# Patient Record
Sex: FEMALE | Race: WHITE | NOT HISPANIC OR LATINO | Employment: FULL TIME | URBAN - METROPOLITAN AREA
[De-identification: names, ages, dates, MRNs, and addresses within clinical notes are randomized per-mention and may not be internally consistent; named-entity substitution may affect disease eponyms.]

---

## 2020-06-02 ENCOUNTER — TELEPHONE (OUTPATIENT)
Dept: URGENT CARE | Facility: CLINIC | Age: 20
End: 2020-06-02

## 2020-06-02 NOTE — TELEPHONE ENCOUNTER
----- Message from Faheem Varma MD sent at 6/2/2020  5:23 PM EDT -----  Negative test     Called and spoke to patient informed her of her negative covid 19 test results. She said she still has a sore throat and I informed her she still need to quarantine for the next 8 days, and not to return to work until she was symptom free for 72 hours. She stated she understood

## 2020-09-29 ENCOUNTER — HOSPITAL ENCOUNTER (OUTPATIENT)
Dept: GENERAL RADIOLOGY | Facility: HOSPITAL | Age: 20
Discharge: HOME | End: 2020-09-29
Admitting: PHYSICIAN ASSISTANT

## 2020-09-29 ENCOUNTER — OFFICE VISIT (OUTPATIENT)
Dept: INTERNAL MEDICINE | Facility: CLINIC | Age: 20
End: 2020-09-29

## 2020-09-29 VITALS — TEMPERATURE: 97.7 F | HEIGHT: 66 IN | BODY MASS INDEX: 23.11 KG/M2 | WEIGHT: 143.8 LBS

## 2020-09-29 DIAGNOSIS — R07.81 PLEURITIC CHEST PAIN: Primary | ICD-10-CM

## 2020-09-29 PROBLEM — G43.909 MIGRAINE HEADACHE: Status: ACTIVE | Noted: 2020-09-29

## 2020-09-29 PROCEDURE — 71046 X-RAY EXAM CHEST 2 VIEWS: CPT

## 2020-09-29 PROCEDURE — 99203 OFFICE O/P NEW LOW 30 MIN: CPT | Performed by: PHYSICIAN ASSISTANT

## 2020-09-29 RX ORDER — NAPROXEN 500 MG/1
500 TABLET ORAL 2 TIMES DAILY WITH MEALS
Qty: 14 TABLET | Refills: 0 | Status: SHIPPED | OUTPATIENT
Start: 2020-09-29 | End: 2020-10-06

## 2020-09-29 RX ORDER — IBUPROFEN 200 MG
200 TABLET ORAL EVERY 6 HOURS PRN
COMMUNITY
End: 2021-07-12

## 2020-09-29 RX ORDER — TOPIRAMATE 25 MG/1
25 CAPSULE, COATED PELLETS ORAL AS NEEDED
COMMUNITY
End: 2022-01-23

## 2020-09-29 NOTE — PROGRESS NOTES
Subjective   Chief Complaint   Patient presents with   • Back Pain     NP- radiating to chest       History of Present Illness      Pt is a 20 yr old female with migraine headaches who presents today to establish care and for complaint of right scapular pain. She has occular migraines, but has not had one in almost a year. Stress tends to trigger and increase the frequency of them. She does have a dull frontal headache every other day that is relieved with nsaids. She has no changes in her vision, nausea or vomiting.     She takes topamax sprinkles prn for acute migraine headaches which has helped.     She had a pap smear in March and is [facility] to [facility] care since moving. She has no family hx of breast cancer or colon cancer. She is seeing Sammi Esquivel, GYN.    Has a history of back pain radiating anteirorly to chest with vaping with jewel. She quit vaping a few months.  This weekend had similar pain while sitting watching football. Had a constant stabbing pain starting in her right shoulder blade and radiating anteriorly to her chest. She took 2 tylenol tablets and an advil. Next day woke up and it was gone, but not as bad.     Not affected by eating. Had sharp pain with taking a deep breath. No abdominal pain. No rash present. Pressing on it made it better. No coughing or chills.      Patient Active Problem List   Diagnosis   • Migraine headache       No Known Allergies    Current Outpatient Medications on File Prior to Visit   Medication Sig Dispense Refill   • Levonorgestrel (KYLEENA IU) by Intrauterine route.     • topiramate (TOPAMAX) 25 MG capsule (sprinkle) Take 25 mg by mouth As Needed.     • ibuprofen (ADVIL,MOTRIN) 200 MG tablet Take 200 mg by mouth Every 6 (Six) Hours As Needed.       No current facility-administered medications on file prior to visit.        Past Medical History:   Diagnosis Date   • Migraine        Family History   Problem Relation Age of Onset   • Diabetes  Mother    • Cancer Father         melanoma   • Hyperlipidemia Father    • Hypertension Father    • Epilepsy Father    • Migraines Paternal Aunt    • Diabetes Maternal Grandmother    • Cancer Paternal Grandfather         melanoma       Social History     Socioeconomic History   • Marital status: Single     Spouse name: Not on file   • Number of children: Not on file   • Years of education: Not on file   • Highest education level: Not on file   Tobacco Use   • Smoking status: Former Smoker   • Smokeless tobacco: Never Used   • Tobacco comment: vaping   Substance and Sexual Activity   • Alcohol use: Yes   • Drug use: Never   • Sexual activity: Yes       Past Surgical History:   Procedure Laterality Date   • EAR TUBES     • WISDOM TOOTH EXTRACTION           The following portions of the patient's history were reviewed and updated as appropriate: problem list, allergies, current medications, past medical history, past family history, past social history and past surgical history.    Review of Systems   Skin: Negative for rash.   Musculoskeletal: Positive for back pain.   Gastrointestinal: Negative for bloating, abdominal pain, change in bowel habit, constipation, diarrhea and heartburn.       Immunization History   Administered Date(s) Administered   • DTaP 1998, 1998, 01/21/1999, 01/13/2000, 07/02/2002   • Flu Vaccine Quad PF >18YRS 10/24/2001   • HPV Quadrivalent 07/13/2009, 09/14/2009, 01/15/2010   • Hep A / Hep B 01/02/2018, 02/07/2018, 07/12/2018   • Hep A, 2 Dose 10/24/2008, 07/13/2009, 07/31/2009   • Hep B, Adolescent or Pediatric 1998, 1998, 01/21/1999   • Hib (HbOC) 1998, 1998, 01/21/1999, 01/13/2000   • Hib (PRP-OMP) 1998, 1998, 01/21/1999, 01/13/2000   • Hpv9 11/09/2017   • IPV 1998, 1998, 01/13/2000, 07/12/2002   • Influenza LAIV (Nasal) 10/24/2008   • Influenza Quad Vaccine (Inpatient) 10/24/2001   • MMR 10/11/1999, 07/12/2002   • Meningococcal  "MCV4P (Menactra) 09/14/2009, 11/09/2017   • OPV 1998, 1998, 01/13/2000   • PPD Test 02/28/2017, 11/09/2017   • Pneumococcal Conjugate 13-Valent (PCV13) 11/22/2000   • Tdap 07/13/2009, 12/01/2017   • Varicella 07/15/1999, 01/13/2000, 10/24/2008, 02/07/2018       Objective   Vitals:    09/29/20 0830   Temp: 97.7 °F (36.5 °C)   Weight: 65.2 kg (143 lb 12.8 oz)   Height: 167.6 cm (66\")     Body mass index is 23.21 kg/m².  Physical Exam  Vitals signs reviewed.   Constitutional:       Appearance: Normal appearance.   HENT:      Head: Normocephalic and atraumatic.   Eyes:      Extraocular Movements: Extraocular movements intact.      Conjunctiva/sclera: Conjunctivae normal.      Pupils: Pupils are equal, round, and reactive to light.   Neck:      Musculoskeletal: Neck supple.   Cardiovascular:      Rate and Rhythm: Normal rate and regular rhythm.      Heart sounds: Normal heart sounds.   Pulmonary:      Effort: Pulmonary effort is normal.      Breath sounds: Normal breath sounds.   Abdominal:      General: Abdomen is flat. Bowel sounds are normal. There is no distension.      Palpations: Abdomen is soft. There is no mass.      Tenderness: There is no abdominal tenderness.   Musculoskeletal:      Comments: No tenderness over thoracic spine. No tenderness over right scapula.    Skin:     Comments: No rashes present   Neurological:      Mental Status: She is alert.       Assessment/Plan   Tran was seen today for back pain.    Diagnoses and all orders for this visit:    Pleuritic chest pain  -     XR Chest 2 View  -     naproxen (Naprosyn) 500 MG tablet; Take 1 tablet by mouth 2 (Two) Times a Day With Meals for 7 days.      Sounds like pleuritic pain, treat with nsaids x 1 week and CXR. Call if not improving.          "

## 2020-09-30 ENCOUNTER — TELEPHONE (OUTPATIENT)
Dept: INTERNAL MEDICINE | Facility: CLINIC | Age: 20
End: 2020-09-30

## 2020-09-30 NOTE — TELEPHONE ENCOUNTER
Patient is calling to state she is returning a call from [facility].    Unable to reach .    Please advise.    Patient call back [phone]

## 2020-10-28 ENCOUNTER — TELEMEDICINE (OUTPATIENT)
Dept: FAMILY MEDICINE CLINIC | Facility: TELEHEALTH | Age: 20
End: 2020-10-28

## 2020-10-28 DIAGNOSIS — Z76.89 RETURN TO WORK EVALUATION: Primary | ICD-10-CM

## 2020-10-28 DIAGNOSIS — J02.0 STREP PHARYNGITIS: ICD-10-CM

## 2020-10-28 PROBLEM — Z97.5 IUD (INTRAUTERINE DEVICE) IN PLACE: Status: ACTIVE | Noted: 2020-03-03

## 2020-10-28 PROBLEM — J01.00 ACUTE MAXILLARY SINUSITIS: Status: ACTIVE | Noted: 2020-01-28

## 2020-10-28 PROCEDURE — 99212 OFFICE O/P EST SF 10 MIN: CPT | Performed by: NURSE PRACTITIONER

## 2020-10-28 RX ORDER — FLUCONAZOLE 150 MG/1
TABLET ORAL
COMMUNITY
Start: 2020-10-20 | End: 2020-11-30

## 2020-10-28 NOTE — PROGRESS NOTES
CHIEF COMPLAINT  Chief Complaint   Patient presents with   • return to work   • Sore Throat         HPI  Tran Jocelyn Thompson is a 20 y.o. female  presents with complaint of strep diagnosed on 10/25/20 at .  Was tested for COVID-19 and result was negative. Works at [employer].  She feels much better today after being on Amoxicillin for 48 hours.    Review of Systems   Constitutional: Negative for activity change, appetite change and fever.   HENT: Negative for sore throat.    All other systems reviewed and are negative.      Past Medical History:   Diagnosis Date   • Migraine        Family History   Problem Relation Age of Onset   • Diabetes Mother    • Cancer Father         melanoma   • Hyperlipidemia Father    • Hypertension Father    • Epilepsy Father    • Migraines Paternal Aunt    • Diabetes Maternal Grandmother    • Cancer Paternal Grandfather         melanoma       Social History     Socioeconomic History   • Marital status: Single     Spouse name: Not on file   • Number of children: Not on file   • Years of education: Not on file   • Highest education level: Not on file   Tobacco Use   • Smoking status: Former Smoker   • Smokeless tobacco: Never Used   • Tobacco comment: vaping   Substance and Sexual Activity   • Alcohol use: Yes   • Drug use: Never   • Sexual activity: Yes         There were no vitals taken for this visit.    PHYSICAL EXAM  Physical Exam   Constitutional: She is oriented to person, place, and time. She appears well-developed and well-nourished. She does not have a sickly appearance. She does not appear ill.   HENT:   Head: Normocephalic and atraumatic.   Pulmonary/Chest: Effort normal.  No respiratory distress.  Neurological: She is alert and oriented to person, place, and time.       Results for orders placed or performed during the hospital encounter of 10/26/20   COVID-19,[facility] ROUTINE, NP/OP SWAB IN TRANSPORT MEDIA OR ESWAB 72 HR TAT - Swab, Anterior nasal     Specimen: Anterior nasal; Swab   Result Value Ref Range    SARS-CoV-2, CYNTHIA Not Detected Not Detected   POCT Rapid Strep A    Specimen: Swab   Result Value Ref Range    Rapid Strep A Screen Positive (A) Negative, VALID, INVALID, Not Performed    Internal Control Passed Passed    Lot Number [id number]     Expiration Date 4,773,021        Diagnoses and all orders for this visit:    1. Return to work evaluation (Primary)  --RTW for employee health sent in letter to patient MyChart    2. Strep pharyngitis  --has improved, cleared for RTW per guidelines        FOLLOW-UP  As discussed during visit with PCP/[facility] if no improvement or Urgent Care/Emergency Department if worsening of symptoms    Patient verbalizes understanding of medication dosage, comfort measures, instructions for treatment and follow-up.    BETZY Zavaleta  10/28/2020  11:21 EDT    This visit was performed via Telehealth.  This patient has been instructed to follow-up with their primary care provider if their symptoms worsen or the treatment provided does not resolve their illness.

## 2020-10-28 NOTE — PATIENT INSTRUCTIONS
Strep Throat, Adult  Strep throat is an infection in the throat that is caused by bacteria. It is common during the cold months of the year. It mostly affects children who are 5-15 years old. However, people of all ages can get it at any time of the year. This infection spreads from person to person (is contagious) through coughing, sneezing, or having close contact.  Your health care provider may use other names to describe the infection. It can be called tonsillitis (if there is swelling of the tonsils), or pharyngitis (if there is swelling at the back of the throat).  What are the causes?  This condition is caused by the Streptococcus pyogenes bacteria.  What increases the risk?  You are more likely to develop this condition if:  · You care for school-age children, or are around school-age children. Children are more likely to get strep throat and may spread it to others.  · You spend time in crowded places where the infection can spread easily.  · You have close contact with someone who has strep throat.  What are the signs or symptoms?  Symptoms of this condition include:  · Fever or chills.  · Redness, swelling, or pain in the tonsils or throat.  · Pain or difficulty when swallowing.  · White or yellow spots on the tonsils or throat.  · Tender glands in the neck and under the jaw.  · Bad smelling breath.  · Red rash all over the body. This is rare.  How is this diagnosed?  This condition is diagnosed by tests that check for the presence and the amount of bacteria that cause strep throat. They are:  · Rapid strep test. Your throat is swabbed and checked for the presence of bacteria. Results are usually ready in minutes.  · Throat culture test. Your throat is swabbed. The sample is placed in a cup that allows infections to grow. Results are usually ready in 1 or 2 days.  How is this treated?  This condition may be treated with:  · Medicines that kill germs (antibiotics).  · Medicines that relieve pain or fever.  These include:  ? Ibuprofen or acetaminophen.  ? Aspirin, only for patients who are over the age of 18.  ? Throat lozenges.  ? Throat sprays.  Follow these instructions at home:  Medicines    · Take over-the-counter and prescription medicines only as told by your health care provider.  · Take your antibiotic medicine as told by your health care provider. Do not stop taking the antibiotic even if you start to feel better.  Eating and drinking    · If you have trouble swallowing, try eating soft foods until your sore throat feels better.  · Drink enough fluid to keep your urine pale yellow.  · To help relieve pain, you may have:  ? Warm fluids, such as soup and tea.  ? Cold fluids, such as frozen desserts or popsicles.  General instructions  · Gargle with a salt-water mixture 3-4 times a day or as needed. To make a salt-water mixture, completely dissolve ½-1 tsp (3-6 g) of salt in 1 cup (237 mL) of warm water.  · Get plenty of rest.  · Stay home from work or school until you have been taking antibiotics for 24 hours.  · Avoid smoking or being around people who smoke.  · Keep all follow-up visits as told by your health care provider. This is important.  How is this prevented?    · Do not share food, drinking cups, or personal items that could cause the infection to spread to other people.  · Wash your hands well with soap and water, and make sure that all people in your house wash their hands well.  · Have family members tested if they have a sore throat or fever. They may need an antibiotic if they have strep throat.  Contact a health care provider if:  · The glands in your neck continue to get bigger.  · You develop a rash, cough, or earache.  · You cough up a thick mucus that is green, yellow-brown, or bloody.  · You have pain or discomfort that does not get better with medicine.  · Your symptoms seem to be getting worse and not better.  · You have a fever.  Get help right away if:  · You have new symptoms, such as  vomiting, severe headache, stiff or painful neck, chest pain, or shortness of breath.  · You have severe throat pain, drooling, or changes in your voice.  · You have swelling of the neck, or the skin on the neck becomes red and tender.  · You have signs of dehydration, such as tiredness (fatigue), dry mouth, and decreased urination.  · You become increasingly sleepy, or you cannot wake up completely.  · Your joints become red or painful.  Summary  · Strep throat is an infection in the throat that is caused by the Streptococcus pyogenes bacteria. This infection is spread from person to person (is contagious) through coughing, sneezing, or having close contact.  · Take your medicines, including antibiotics, as told by your health care provider. Do not stop taking the antibiotic even if you start to feel better.  · To prevent the spread of germs, wash your hands well with soap and water. Have others do the same. Do not share food, drinking cups, or personal items.  · Get help right away if you have new symptoms, such as vomiting, severe headache, stiff or painful neck, chest pain, or shortness of breath.  This information is not intended to replace advice given to you by your health care provider. Make sure you discuss any questions you have with your health care provider.  Document Released: 12/15/2001 Document Revised: 03/06/2020 Document Reviewed: 03/06/2020  [employer] Patient Education © 2020 [employer]

## 2020-11-30 ENCOUNTER — TELEMEDICINE (OUTPATIENT)
Dept: FAMILY MEDICINE CLINIC | Facility: TELEHEALTH | Age: 20
End: 2020-11-30

## 2020-11-30 VITALS — WEIGHT: 145 LBS | BODY MASS INDEX: 23.3 KG/M2 | HEIGHT: 66 IN

## 2020-11-30 DIAGNOSIS — J02.9 PHARYNGITIS, UNSPECIFIED ETIOLOGY: Primary | ICD-10-CM

## 2020-11-30 PROCEDURE — 99213 OFFICE O/P EST LOW 20 MIN: CPT | Performed by: NURSE PRACTITIONER

## 2020-11-30 RX ORDER — FLUCONAZOLE 150 MG/1
150 TABLET ORAL ONCE
Qty: 1 TABLET | Refills: 0 | Status: SHIPPED | OUTPATIENT
Start: 2020-11-30 | End: 2020-11-30

## 2020-11-30 RX ORDER — AMOXICILLIN 875 MG/1
875 TABLET, COATED ORAL 2 TIMES DAILY
Qty: 20 TABLET | Refills: 0 | Status: SHIPPED | OUTPATIENT
Start: 2020-11-30 | End: 2021-02-03

## 2020-11-30 NOTE — PROGRESS NOTES
CHIEF COMPLAINT  Chief Complaint   Patient presents with   • Sore Throat     she thinks its strep throat         HPI  Tran Jocelyn Thompson is a 20 y.o. female  presents with complaint of sore throat that is severe and she thinks she has strep throat again. She has h/o strep throat and was just treated for it last month with Amoxicillin. She is planning to see an ENT soon. She denies headache, nausea or vomiting or fever. She states she rarely has a fever when she gets strep throat. She is using Advil for pain. She tested negative for covid-19 with the results obtained today.     Review of Systems   Constitutional: Negative.    HENT: Positive for rhinorrhea, sneezing, sore throat and trouble swallowing. Negative for ear pain, postnasal drip, sinus pressure and sinus pain.    Eyes: Negative.    Respiratory: Negative.    Cardiovascular: Negative.    Gastrointestinal: Negative.    Musculoskeletal: Positive for neck pain.        Right sided lymph node tenderness and swelling   Skin: Negative.    Allergic/Immunologic: Positive for environmental allergies.   Neurological: Negative.    Hematological: Negative.    Psychiatric/Behavioral: Negative.        Past Medical History:   Diagnosis Date   • History of strep sore throat    • Migraine        Family History   Problem Relation Age of Onset   • Diabetes Mother    • Cancer Father         melanoma   • Hyperlipidemia Father    • Hypertension Father    • Epilepsy Father    • Migraines Paternal Aunt    • Diabetes Maternal Grandmother    • Cancer Paternal Grandfather         melanoma       Social History     Socioeconomic History   • Marital status: Single     Spouse name: Not on file   • Number of children: Not on file   • Years of education: Not on file   • Highest education level: Not on file   Tobacco Use   • Smoking status: Former Smoker   • Smokeless tobacco: Never Used   • Tobacco comment: vaping   Substance and Sexual Activity   • Alcohol use: Yes   • Drug use: Never   •  "Sexual activity: Yes         Ht 167.6 cm (66\")   Wt 65.8 kg (145 lb)   BMI 23.40 kg/m²     PHYSICAL EXAM  Physical Exam   Constitutional: She is oriented to person, place, and time. She appears well-developed and well-nourished. She does not have a sickly appearance. She does not appear ill. No distress.   HENT:   Head: Normocephalic and atraumatic.   Right Ear: Hearing and external ear normal.   Left Ear: Hearing and external ear normal.   Nose: Rhinorrhea present. Right sinus exhibits no maxillary sinus tenderness and no frontal sinus tenderness. Left sinus exhibits no maxillary sinus tenderness and no frontal sinus tenderness.   Mouth/Throat: Mouth/Lips are normal.Oropharyngeal exudate present.   Oral pharynx erythremic with exudate and tonsillar swelling.     Eyes: Conjunctivae are normal.   Pulmonary/Chest: Effort normal.  No respiratory distress. She no audible wheeze...  Lymphadenopathy:     She has cervical adenopathy (right sided lymph node swelling ).   Neurological: She is alert and oriented to person, place, and time.   Psychiatric: She has a normal mood and affect.   Vitals reviewed.      Results for orders placed or performed during the hospital encounter of 10/26/20   COVID-19,[facility] ROUTINE, NP/OP SWAB IN TRANSPORT MEDIA OR ESWAB 72 HR TAT - Swab, Anterior nasal    Specimen: Anterior nasal; Swab   Result Value Ref Range    SARS-CoV-2, CYNTHIA Not Detected Not Detected   POCT Rapid Strep A    Specimen: Swab   Result Value Ref Range    Rapid Strep A Screen Positive (A) Negative, VALID, INVALID, Not Performed    Internal Control Passed Passed    Lot Number [id number]     Expiration Date 4,302,021        Diagnoses and all orders for this visit:    1. Pharyngitis, unspecified etiology (Primary)  -     amoxicillin (AMOXIL) 875 MG tablet; Take 1 tablet by mouth 2 (Two) Times a Day.  Dispense: 20 tablet; Refill: 0  -     fluconazole (Diflucan) 150 MG tablet; Take 1 tablet by mouth 1 (One) Time for 1 dose.  " Dispense: 1 tablet; Refill: 0    treated empirically for strep throat due to history and symptoms.   Dispose of tooth brush after 48 hours of antibiotic therapy.   Warm salt water gargles.   Follow up with PCP for evaluation of frequent strep throat.     **if at any time experiences fever AND/OR worsening cough AND/OR shortness of breath AND/OR loss of sense of taste or smell, has been advised to go to nearest urgent care or emergency department for evaluation AND/OR testing    **if no improvement, but not worsening, may schedule a f/u virtual visit or E-visit      FOLLOW-UP  As discussed during visit with PCP/Virtual Care if no improvement or Urgent Care/Emergency Department if worsening of symptoms    Patient verbalizes understanding of medication dosage, comfort measures, instructions for treatment and follow-up.    Yohana Tomlin, BETZY  11/30/2020  18:01 EST    This visit was performed via Telehealth.  This patient has been instructed to follow-up with their primary care provider if their symptoms worsen or the treatment provided does not resolve their illness.

## 2020-11-30 NOTE — PATIENT INSTRUCTIONS
Strep Throat, Adult  Strep throat is an infection of the throat. It is caused by germs (bacteria). Strep throat is common during the cold months of the year. It mostly affects children who are 5-15 years old. However, people of all ages can get it at any time of the year.  When strep throat affects the tonsils, it is called tonsillitis. When it affects the back of the throat, it is called pharyngitis. This infection spreads from person to person through coughing, sneezing, or having close contact.  What are the causes?  This condition is caused by the Streptococcus pyogenes germ.  What increases the risk?  You are more likely to develop this condition if:  · You care for young children. Children are more likely to get strep throat and may spread it to others.  · You go to crowded places. Germs can spread easily in such places.  · You kiss or touch someone who has strep throat.  What are the signs or symptoms?  Symptoms of this condition include:  · Fever or chills.  · Redness, swelling, or pain in the tonsils or throat.  · Pain or trouble when swallowing.  · White or yellow spots on the tonsils or throat.  · Tender glands in the neck and under the jaw.  · Bad breath.  · Red rash all over the body. This is rare.  How is this treated?  This condition may be treated with:  · Medicines that kill germs (antibiotics).  · Medicines that treat pain or fever. These include:  ? Ibuprofen or acetaminophen.  ? Aspirin, only for patients who are over the age of 18.  ? Throat lozenges.  ? Throat sprays.  Follow these instructions at home:  Medicines    · Take over-the-counter and prescription medicines only as told by your doctor.  · Take your antibiotic medicine as told by your doctor. Do not stop taking the antibiotic even if you start to feel better.  Eating and drinking    · If you have trouble swallowing, eat soft foods until your throat feels better.  · Drink enough fluid to keep your pee (urine) pale yellow.  · To help  with pain, you may have:  ? Warm fluids, such as soup and tea.  ? Cold fluids, such as frozen desserts or popsicles.  General instructions  · Rinse your mouth (gargle) with a salt-water mixture 3-4 times a day or as needed. To make a salt-water mixture, dissolve ½-1 tsp (3-6 g) of salt in 1 cup (237 mL) of warm water.  · Rest as much as you can.  · Stay home from work or school until you have been taking antibiotics for 24 hours.  · Avoid smoking or being around people who smoke.  · Keep all follow-up visits as told by your doctor. This is important.  How is this prevented?    · Do not share food, drinking cups, or personal items. They can cause the germs to spread.  · Wash your hands well with soap and water. Make sure that all people in your house wash their hands well.  · Have family members tested if they have a fever or a sore throat. They may need an antibiotic if they have strep throat.  Contact a doctor if:  · You have swelling in your neck that keeps getting bigger.  · You get a rash, cough, or earache.  · You cough up a thick fluid that is green, yellow-brown, or bloody.  · You have pain that does not get better with medicine.  · Your symptoms get worse instead of getting better.  · You have a fever.  Get help right away if:  · You vomit.  · You have a very bad headache.  · Your neck hurts or feels stiff.  · You have chest pain or are short of breath.  · You have drooling, very bad throat pain, or changes in your voice.  · Your neck is swollen, or the skin gets red and tender.  · Your mouth is dry, or you are peeing less than normal.  · You keep feeling more tired or have trouble waking up.  · Your joints are red or painful.  Summary  · Strep throat is an infection of the throat. It is caused by germs (bacteria).  · This infection can spread from person to person through coughing, sneezing, or having close contact.  · Take your medicines, including antibiotics, as told by your doctor. Do not stop taking  the antibiotic even if you start to feel better.  · To prevent the spread of germs, wash your hands well with soap and water. Have others do the same. Do not share food, drinking cups, or personal items.  · Get help right away if you have a bad headache, chest pain, shortness of breath, a stiff or painful neck, or you vomit.  This information is not intended to replace advice given to you by your health care provider. Make sure you discuss any questions you have with your health care provider.  Document Revised: 03/06/2020 Document Reviewed: 03/06/2020  Elsevier Patient Education © 2020 [employer]

## 2020-12-22 ENCOUNTER — IMMUNIZATION (OUTPATIENT)
Dept: VACCINE CLINIC | Facility: HOSPITAL | Age: 20
End: 2020-12-22

## 2020-12-22 PROCEDURE — 91300 HC SARSCOV02 VAC 30MCG/0.3ML IM: CPT | Performed by: INTERNAL MEDICINE

## 2020-12-22 PROCEDURE — 0001A: CPT | Performed by: INTERNAL MEDICINE

## 2021-01-12 ENCOUNTER — IMMUNIZATION (OUTPATIENT)
Dept: VACCINE CLINIC | Facility: HOSPITAL | Age: 21
End: 2021-01-12

## 2021-01-12 PROCEDURE — 91300 HC SARSCOV02 VAC 30MCG/0.3ML IM: CPT | Performed by: INTERNAL MEDICINE

## 2021-01-12 PROCEDURE — 0001A: CPT | Performed by: INTERNAL MEDICINE

## 2021-01-12 PROCEDURE — 0002A: CPT | Performed by: INTERNAL MEDICINE

## 2021-02-03 ENCOUNTER — OFFICE VISIT (OUTPATIENT)
Dept: INTERNAL MEDICINE | Facility: CLINIC | Age: 21
End: 2021-02-03

## 2021-02-03 VITALS — WEIGHT: 145 LBS | TEMPERATURE: 97.4 F | BODY MASS INDEX: 23.3 KG/M2 | HEIGHT: 66 IN

## 2021-02-03 DIAGNOSIS — K62.5 RECTAL BLEEDING: Primary | ICD-10-CM

## 2021-02-03 PROBLEM — J01.00 ACUTE MAXILLARY SINUSITIS: Status: RESOLVED | Noted: 2020-01-28 | Resolved: 2021-02-03

## 2021-02-03 LAB
BASOPHILS # BLD AUTO: 0.01 10*3/MM3 (ref 0–0.2)
BASOPHILS NFR BLD AUTO: 0.2 % (ref 0–1.5)
EOSINOPHIL # BLD AUTO: 0.1 10*3/MM3 (ref 0–0.4)
EOSINOPHIL NFR BLD AUTO: 1.8 % (ref 0.3–6.2)
ERYTHROCYTE [DISTWIDTH] IN BLOOD BY AUTOMATED COUNT: 12.3 % (ref 12.3–15.4)
HCT VFR BLD AUTO: 42.3 % (ref 34–46.6)
HGB BLD-MCNC: 14.1 G/DL (ref 12–15.9)
IMM GRANULOCYTES # BLD AUTO: 0.01 10*3/MM3 (ref 0–0.05)
IMM GRANULOCYTES NFR BLD AUTO: 0.2 % (ref 0–0.5)
LYMPHOCYTES # BLD AUTO: 2.13 10*3/MM3 (ref 0.7–3.1)
LYMPHOCYTES NFR BLD AUTO: 37.6 % (ref 19.6–45.3)
MCH RBC QN AUTO: 31 PG (ref 26.6–33)
MCHC RBC AUTO-ENTMCNC: 33.3 G/DL (ref 31.5–35.7)
MCV RBC AUTO: 93 FL (ref 79–97)
MONOCYTES # BLD AUTO: 0.39 10*3/MM3 (ref 0.1–0.9)
MONOCYTES NFR BLD AUTO: 6.9 % (ref 5–12)
NEUTROPHILS # BLD AUTO: 3.03 10*3/MM3 (ref 1.7–7)
NEUTROPHILS NFR BLD AUTO: 53.3 % (ref 42.7–76)
NRBC BLD AUTO-RTO: 0 /100 WBC (ref 0–0.2)
PLATELET # BLD AUTO: 217 10*3/MM3 (ref 140–450)
RBC # BLD AUTO: 4.55 10*6/MM3 (ref 3.77–5.28)
WBC # BLD AUTO: 5.67 10*3/MM3 (ref 3.4–10.8)

## 2021-02-03 PROCEDURE — 99213 OFFICE O/P EST LOW 20 MIN: CPT | Performed by: PHYSICIAN ASSISTANT

## 2021-02-03 NOTE — PROGRESS NOTES
Subjective   Chief Complaint   Patient presents with   • Rectal Bleeding     x2 days       History of Present Illness     Pt is here today with rectal bleeding x 2 days. She had a brazillian wax 2 days ago, noticed the blood with wiping after urinating. She does not have periods with her IUD. She has not had any constipation, straining or rectal pain.      Patient Active Problem List   Diagnosis   • Migraine headache   • IUD (intrauterine device) in place       No Known Allergies    Current Outpatient Medications on File Prior to Visit   Medication Sig Dispense Refill   • ibuprofen (ADVIL,MOTRIN) 200 MG tablet Take 200 mg by mouth Every 6 (Six) Hours As Needed.     • Levonorgestrel (KYLEENA IU) by Intrauterine route.     • topiramate (TOPAMAX) 25 MG capsule (sprinkle) Take 25 mg by mouth As Needed.     • [DISCONTINUED] amoxicillin (AMOXIL) 875 MG tablet Take 1 tablet by mouth 2 (Two) Times a Day. 20 tablet 0     No current facility-administered medications on file prior to visit.        History reviewed. No pertinent past medical history.    Family History   Problem Relation Age of Onset   • Diabetes Mother    • Cancer Father         melanoma   • Hyperlipidemia Father    • Hypertension Father    • Epilepsy Father    • Migraines Paternal Aunt    • Diabetes Maternal Grandmother    • Cancer Paternal Grandfather         melanoma       Social History     Socioeconomic History   • Marital status: Single     Spouse name: Not on file   • Number of children: Not on file   • Years of education: Not on file   • Highest education level: Not on file   Tobacco Use   • Smoking status: Former Smoker   • Smokeless tobacco: Never Used   • Tobacco comment: vaping   Substance and Sexual Activity   • Alcohol use: Yes   • Drug use: Never   • Sexual activity: Yes       Past Surgical History:   Procedure Laterality Date   • EAR TUBES     • WISDOM TOOTH EXTRACTION           The following portions of the patient's history were reviewed and  "updated as appropriate: problem list, allergies, current medications, past medical history, past family history, past social history and past surgical history.    Review of Systems   Gastrointestinal: Positive for hematochezia and hemorrhoids.       Immunization History   Administered Date(s) Administered   • COVID-19 (PFIZER) 12/22/2020, 01/12/2021   • DTaP 1998, 1998, 01/21/1999, 01/13/2000, 07/02/2002   • Flu Vaccine Quad PF >18YRS 10/24/2001   • HPV Quadrivalent 07/13/2009, 09/14/2009, 01/15/2010   • Hep A / Hep B 01/02/2018, 02/07/2018, 07/12/2018   • Hep A, 2 Dose 10/24/2008, 07/13/2009, 07/31/2009   • Hep B, Adolescent or Pediatric 1998, 1998, 01/21/1999   • Hib (HbOC) 1998, 1998, 01/21/1999, 01/13/2000   • Hib (PRP-OMP) 1998, 1998, 01/21/1999, 01/13/2000   • Hpv9 11/09/2017   • IPV 1998, 1998, 01/13/2000, 07/12/2002   • Influenza LAIV (Nasal) 10/24/2008   • Influenza Quad Vaccine (Inpatient) 10/24/2001   • MMR 10/11/1999, 07/12/2002   • Meningococcal MCV4P (Menactra) 09/14/2009, 11/09/2017   • OPV 1998, 1998, 01/13/2000   • PPD Test 02/28/2017, 11/09/2017   • Pneumococcal Conjugate 13-Valent (PCV13) 11/22/2000   • Tdap 07/13/2009, 12/01/2017   • Varicella 07/15/1999, 01/13/2000, 10/24/2008, 02/07/2018       Objective   Vitals:    02/03/21 1039   Temp: 97.4 °F (36.3 °C)   Weight: 65.8 kg (145 lb)   Height: 167.6 cm (66\")     Body mass index is 23.4 kg/m².  Physical Exam  Constitutional:       Appearance: Normal appearance.   Genitourinary:     Rectum: Guaiac result negative. No anal fissure, external hemorrhoid or internal hemorrhoid. Normal anal tone.   Neurological:      Mental Status: She is alert.           Assessment/Plan   Diagnoses and all orders for this visit:    1. Rectal bleeding (Primary)  -     CBC & Differential    Heme negative today, check CBC. If continue will have her see GI.     Return for Lab Today.           "

## 2021-02-10 ENCOUNTER — OFFICE VISIT (OUTPATIENT)
Dept: INTERNAL MEDICINE | Facility: CLINIC | Age: 21
End: 2021-02-10

## 2021-02-10 VITALS — TEMPERATURE: 97.3 F | HEIGHT: 66 IN | BODY MASS INDEX: 23.75 KG/M2 | WEIGHT: 147.8 LBS

## 2021-02-10 DIAGNOSIS — R10.2 PELVIC PAIN: Primary | ICD-10-CM

## 2021-02-10 PROCEDURE — 99213 OFFICE O/P EST LOW 20 MIN: CPT | Performed by: PHYSICIAN ASSISTANT

## 2021-02-10 NOTE — PROGRESS NOTES
Subjective   Chief Complaint   Patient presents with   • Abdominal Pain     ovarian        History of Present Illness      Pt is here today with ongoing lower abdominal pain. She states she has seen GYN twice and been treated for BV as well as candida. She continues to have discomfort around her introitus with some blisters she has felt. Dr. Esquivel examined and was unable to find any tearing or blisters. She has an IUD but does not have periods. She states she has discomfort with intercourse. Her pain feels like it could be ovarian pain.     Her copay for GYN is too expensive right now to go back and see her.       Patient Active Problem List   Diagnosis   • Migraine headache   • IUD (intrauterine device) in place       No Known Allergies    Current Outpatient Medications on File Prior to Visit   Medication Sig Dispense Refill   • ibuprofen (ADVIL,MOTRIN) 200 MG tablet Take 200 mg by mouth Every 6 (Six) Hours As Needed.     • Levonorgestrel (KYLEENA IU) by Intrauterine route.     • topiramate (TOPAMAX) 25 MG capsule (sprinkle) Take 25 mg by mouth As Needed.       No current facility-administered medications on file prior to visit.        History reviewed. No pertinent past medical history.    Family History   Problem Relation Age of Onset   • Diabetes Mother    • Cancer Father         melanoma   • Hyperlipidemia Father    • Hypertension Father    • Epilepsy Father    • Migraines Paternal Aunt    • Diabetes Maternal Grandmother    • Cancer Paternal Grandfather         melanoma       Social History     Socioeconomic History   • Marital status: Single     Spouse name: Not on file   • Number of children: Not on file   • Years of education: Not on file   • Highest education level: Not on file   Tobacco Use   • Smoking status: Former Smoker   • Smokeless tobacco: Never Used   • Tobacco comment: vaping   Substance and Sexual Activity   • Alcohol use: Yes   • Drug use: Never   • Sexual activity: Yes       Past Surgical  "History:   Procedure Laterality Date   • EAR TUBES     • WISDOM TOOTH EXTRACTION           The following portions of the patient's history were reviewed and updated as appropriate: problem list, allergies, current medications, past medical history, past family history, past social history and past surgical history.    Review of Systems   Genitourinary: Positive for genital sores and pelvic pain. Negative for non-menstrual bleeding.       Immunization History   Administered Date(s) Administered   • COVID-19 (PFIZER) 12/22/2020, 01/12/2021   • DTaP 1998, 1998, 01/21/1999, 01/13/2000, 07/02/2002   • Flu Vaccine Quad PF >18YRS 10/24/2001   • HPV Quadrivalent 07/13/2009, 09/14/2009, 01/15/2010   • Hep A / Hep B 01/02/2018, 02/07/2018, 07/12/2018   • Hep A, 2 Dose 10/24/2008, 07/13/2009, 07/31/2009   • Hep B, Adolescent or Pediatric 1998, 1998, 01/21/1999   • Hib (HbOC) 1998, 1998, 01/21/1999, 01/13/2000   • Hib (PRP-OMP) 1998, 1998, 01/21/1999, 01/13/2000   • Hpv9 11/09/2017   • IPV 1998, 1998, 01/13/2000, 07/12/2002   • Influenza LAIV (Nasal) 10/24/2008   • Influenza Quad Vaccine (Inpatient) 10/24/2001   • MMR 10/11/1999, 07/12/2002   • Meningococcal MCV4P (Menactra) 09/14/2009, 11/09/2017   • OPV 1998, 1998, 01/13/2000   • PPD Test 02/28/2017, 11/09/2017   • Pneumococcal Conjugate 13-Valent (PCV13) 11/22/2000   • Tdap 07/13/2009, 12/01/2017   • Varicella 07/15/1999, 01/13/2000, 10/24/2008, 02/07/2018       Objective   Vitals:    02/10/21 0813   Temp: 97.3 °F (36.3 °C)   Weight: 67 kg (147 lb 12.8 oz)   Height: 167.6 cm (66\")     Body mass index is 23.86 kg/m².  Physical Exam  Vitals signs reviewed.   Constitutional:       Appearance: Normal appearance.   Genitourinary:     Labia:         Right: No rash, tenderness, lesion or injury.         Left: No rash, tenderness, lesion or injury.       Urethra: No prolapse, urethral pain, urethral swelling or " urethral lesion.      Vagina: Normal.      Cervix: Normal.      Adnexa: Right adnexa normal.        Left: Tenderness (mild tenderness. ) present.    Neurological:      Mental Status: She is alert.       Assessment/Plan   Diagnoses and all orders for this visit:    1. Pelvic pain (Primary)  -     US Non-ob Transvaginal  -     US Testicular or Ovarian Vascular Limited    Will order pelvic US to be done in the next few days. Will also get office notes from Dr. Esquivel for review.

## 2021-02-12 DIAGNOSIS — R10.2 PELVIC PAIN: Primary | ICD-10-CM

## 2021-02-15 ENCOUNTER — HOSPITAL ENCOUNTER (OUTPATIENT)
Dept: ULTRASOUND IMAGING | Facility: HOSPITAL | Age: 21
Discharge: HOME | End: 2021-02-15
Admitting: PHYSICIAN ASSISTANT

## 2021-02-15 PROCEDURE — 93976 VASCULAR STUDY: CPT

## 2021-02-15 PROCEDURE — 76856 US EXAM PELVIC COMPLETE: CPT

## 2021-02-15 PROCEDURE — 76830 TRANSVAGINAL US NON-OB: CPT

## 2021-02-16 ENCOUNTER — HOSPITAL ENCOUNTER (OUTPATIENT)
Dept: ULTRASOUND IMAGING | Facility: HOSPITAL | Age: 21
End: 2021-02-16

## 2021-04-07 ENCOUNTER — OFFICE VISIT (OUTPATIENT)
Dept: INTERNAL MEDICINE | Facility: CLINIC | Age: 21
End: 2021-04-07

## 2021-04-07 VITALS
DIASTOLIC BLOOD PRESSURE: 80 MMHG | WEIGHT: 144 LBS | TEMPERATURE: 97.7 F | SYSTOLIC BLOOD PRESSURE: 130 MMHG | BODY MASS INDEX: 23.14 KG/M2 | HEIGHT: 66 IN

## 2021-04-07 DIAGNOSIS — R19.5 LOOSE STOOLS: ICD-10-CM

## 2021-04-07 DIAGNOSIS — R10.30 LOWER ABDOMINAL PAIN: ICD-10-CM

## 2021-04-07 DIAGNOSIS — A46: Primary | ICD-10-CM

## 2021-04-07 PROCEDURE — 99214 OFFICE O/P EST MOD 30 MIN: CPT | Performed by: PHYSICIAN ASSISTANT

## 2021-04-07 NOTE — PROGRESS NOTES
Subjective   Chief Complaint   Patient presents with   • Abdominal Pain     LLQ, LRQ   • Rectal Bleeding     x 2       History of Present Illness     She has had 6 infections of strep throat in the last 12 months. She has been treated with Amoxicillin in the past and was most recently treated with Cefdinir and Prednisone. She started having pain in her abdomen after starting the antibiotic. After stopping the Cefdinir she then developed rectal bleeding. She only took 4 days of the Cefdinir from the pain. She was having lower abdominal pain over the weekend and it starts just below and around her umbilicus with sharp pains and dull constant pain. Ever since starting the abx she has had diarrhea and loose stools.     She also had mucus around her stool after first starting the abx.     She continues to have recurrent yeast infections and BV She just completed 14 tabs of Flagyl also.     She has continued to have loose stools. Still has some cramping in her central abdomen .     Patient Active Problem List   Diagnosis   • Migraine headache   • IUD (intrauterine device) in place       No Known Allergies    Current Outpatient Medications on File Prior to Visit   Medication Sig Dispense Refill   • ibuprofen (ADVIL,MOTRIN) 200 MG tablet Take 200 mg by mouth Every 6 (Six) Hours As Needed.     • Levonorgestrel (KYLEENA IU) by Intrauterine route.     • topiramate (TOPAMAX) 25 MG capsule (sprinkle) Take 25 mg by mouth As Needed.       No current facility-administered medications on file prior to visit.       History reviewed. No pertinent past medical history.    Family History   Problem Relation Age of Onset   • Diabetes Mother    • Cancer Father         melanoma   • Hyperlipidemia Father    • Hypertension Father    • Epilepsy Father    • Migraines Paternal Aunt    • Diabetes Maternal Grandmother    • Cancer Paternal Grandfather         melanoma       Social History     Socioeconomic History   • Marital status: Single      Spouse name: Not on file   • Number of children: Not on file   • Years of education: Not on file   • Highest education level: Not on file   Tobacco Use   • Smoking status: Former Smoker   • Smokeless tobacco: Never Used   • Tobacco comment: vaping   Vaping Use   • Vaping Use: Former   Substance and Sexual Activity   • Alcohol use: Yes   • Drug use: Never   • Sexual activity: Yes       Past Surgical History:   Procedure Laterality Date   • EAR TUBES     • WISDOM TOOTH EXTRACTION       The following portions of the patient's history were reviewed and updated as appropriate: problem list, allergies, current medications, past medical history, past family history, past social history and past surgical history.    Review of Systems   Constitutional: Negative for fever and weight loss.   Musculoskeletal: Negative for myalgias.   Gastrointestinal: Positive for abdominal pain, diarrhea and hematochezia. Negative for anorexia, constipation, flatus, melena, nausea and vomiting.   Genitourinary: Negative for dysuria, frequency and hematuria.   Neurological: Negative for headaches.       Immunization History   Administered Date(s) Administered   • COVID-19 (PFIZER) 12/22/2020, 01/12/2021   • DTaP 1998, 1998, 01/21/1999, 01/13/2000, 07/02/2002   • Flu Vaccine Quad PF >18YRS 10/24/2001   • HPV Quadrivalent 07/13/2009, 09/14/2009, 01/15/2010   • Hep A / Hep B 01/02/2018, 02/07/2018, 07/12/2018   • Hep A, 2 Dose 10/24/2008, 07/13/2009, 07/31/2009   • Hep B, Adolescent or Pediatric 1998, 1998, 01/21/1999   • Hib (HbOC) 1998, 1998, 01/21/1999, 01/13/2000   • Hib (PRP-OMP) 1998, 1998, 01/21/1999, 01/13/2000   • Hpv9 11/09/2017   • IPV 1998, 1998, 01/13/2000, 07/12/2002   • Influenza LAIV (Nasal) 10/24/2008   • Influenza Quad Vaccine (Inpatient) 10/24/2001   • MMR 10/11/1999, 07/12/2002   • Meningococcal MCV4P (Menactra) 09/14/2009, 11/09/2017   • OPV 1998, 1998,  "01/13/2000   • PPD Test 02/28/2017, 11/09/2017   • Pneumococcal Conjugate 13-Valent (PCV13) 11/22/2000   • Tdap 07/13/2009, 12/01/2017   • Varicella 07/15/1999, 01/13/2000, 10/24/2008, 02/07/2018       Objective   Vitals:    04/07/21 1120 04/07/21 1153   BP:  130/80   Temp: 97.7 °F (36.5 °C)    Weight: 65.3 kg (144 lb)    Height: 167.6 cm (66\")      Body mass index is 23.24 kg/m².  Physical Exam  Vitals reviewed.   Constitutional:       Appearance: Normal appearance.   Cardiovascular:      Rate and Rhythm: Normal rate and regular rhythm.      Heart sounds: Normal heart sounds.   Pulmonary:      Effort: Pulmonary effort is normal.      Breath sounds: Normal breath sounds.   Abdominal:      General: Abdomen is flat. Bowel sounds are normal.      Palpations: Abdomen is soft.      Tenderness: There is abdominal tenderness (around umbilicus).   Neurological:      Mental Status: She is alert.       Assessment/Plan   Diagnoses and all orders for this visit:    1. Chronic recurrent streptococcal erysipelas (Primary)  -     Ambulatory Referral to ENT (Otolaryngology)    2. Loose stools  -     Clostridium Difficile Toxin, PCR - Stool, Per Rectum  -     Ova & Parasite Examination - Stool, Per Rectum  -     Stool Culture (Reference Lab) - Stool, Per Rectum  -     Fecal Leukocytes - Stool, Per Rectum  -     Fecal Fat, Qualitative - Stool, Per Rectum    3. Lower abdominal pain  -     CT Abdomen Pelvis With Contrast    I am concerned for possible C diff infection after multiple rounds of antibiotics to treat recurrent strep. Also concerned for possible enteritis/colitis. I am going to get stool studies and a CT of her abdomen/pelvis. I also will refer her to ENT for consultation about tonsillectomy due to 6 recurrent strep pharyngitis infections in the last 12 months. I do suspect the recurrent use of antibiotics is contributing to her recurring BV and vaginal candidiasis. I have recommended she restart a probiotic at this time. "            Answers for HPI/ROS submitted by the patient on 4/7/2021  What is the primary reason for your visit?: Abdominal Pain

## 2021-04-13 ENCOUNTER — HOSPITAL ENCOUNTER (OUTPATIENT)
Dept: CT IMAGING | Facility: HOSPITAL | Age: 21
Discharge: HOME | End: 2021-04-13
Admitting: PHYSICIAN ASSISTANT

## 2021-04-13 ENCOUNTER — TELEPHONE (OUTPATIENT)
Dept: INTERNAL MEDICINE | Facility: CLINIC | Age: 21
End: 2021-04-13

## 2021-04-13 LAB
BACTERIA SPEC CULT: NORMAL
BACTERIA SPEC CULT: NORMAL
C DIFF TOX GENS STL QL NAA+PROBE: POSITIVE
CAMPYLOBACTER STL CULT: NORMAL
E COLI SXT STL QL IA: NEGATIVE
FA STL QL: NORMAL
NEUTRAL FAT STL QL: NORMAL
O+P SPEC MICRO: NORMAL
O+P STL CONC: NORMAL
SALM + SHIG STL CULT: NORMAL
WBC STL QL MICRO: NORMAL
WBC STL QL MICRO: NORMAL

## 2021-04-13 PROCEDURE — 25010000002 IOPAMIDOL 61 % SOLUTION: Performed by: PHYSICIAN ASSISTANT

## 2021-04-13 PROCEDURE — 74177 CT ABD & PELVIS W/CONTRAST: CPT

## 2021-04-13 RX ORDER — METRONIDAZOLE 500 MG/1
500 TABLET ORAL 3 TIMES DAILY
Qty: 42 TABLET | Refills: 0 | Status: SHIPPED | OUTPATIENT
Start: 2021-04-13 | End: 2021-04-27

## 2021-04-13 RX ADMIN — IOPAMIDOL 85 ML: 612 INJECTION, SOLUTION INTRAVENOUS at 17:22

## 2021-04-13 NOTE — TELEPHONE ENCOUNTER
Caller: Tran Thompson    Relationship: Self    Best call back number: [phone] (H)    What is the best time to reach you: ANYTIME BEFORE 2:30    Who are you requesting to speak with (clinical staff, provider,  specific staff member): KATYA WRIGHT    Do you know the name of the person who called:     What was the call regarding: TEST RESULTS AND DIAGNOSIS.    Do you require a callback: YES

## 2021-04-13 NOTE — TELEPHONE ENCOUNTER
Patient has already seen results on mychart, is aware she has c-diff, didn't know if she needs to go to work at 2:30 or not and needs to know what she needs to do.  I informed her results just came over and you have not been able to review them yet.  Once you do I will call her with your recommendations.

## 2021-04-13 NOTE — TELEPHONE ENCOUNTER
She does indeed have C diff. I am going to treat her with Metronidazole 500 mg TID for 14 days due to the length of her symptoms. Go ahead and have her get the CT still as well. As long as her supervisor is ok with her being at work she can go. I want to see her back in the office in 1 week. Do not start the Diflucan Dr. Esquivel prescribed her until her C diff is resolved.

## 2021-04-19 ENCOUNTER — OFFICE VISIT (OUTPATIENT)
Dept: INTERNAL MEDICINE | Facility: CLINIC | Age: 21
End: 2021-04-19

## 2021-04-19 VITALS
RESPIRATION RATE: 15 BRPM | HEART RATE: 69 BPM | HEIGHT: 66 IN | BODY MASS INDEX: 22.02 KG/M2 | WEIGHT: 137 LBS | DIASTOLIC BLOOD PRESSURE: 72 MMHG | SYSTOLIC BLOOD PRESSURE: 110 MMHG

## 2021-04-19 DIAGNOSIS — A04.72 C. DIFFICILE ENTERITIS: Primary | ICD-10-CM

## 2021-04-19 PROCEDURE — 99213 OFFICE O/P EST LOW 20 MIN: CPT | Performed by: PHYSICIAN ASSISTANT

## 2021-04-19 RX ORDER — FLUCONAZOLE 150 MG/1
TABLET ORAL
COMMUNITY
Start: 2021-04-05 | End: 2021-07-12

## 2021-04-19 NOTE — PROGRESS NOTES
Subjective   Chief Complaint   Patient presents with   • Abdominal Pain     follow up   • Med Management     wants to discuss taking diflucan before she does       History of Present Illness     Pt is here today for fup on her C diff. She had a subsequent CT a/p showing no colitis or thickening of her bowel loops. She is on day 6 of a 14 day course of Metronidazole currently for her C diff. This is her first infection. She is feeling much better, stool is mostly formed. Has some nausea from the medication, but tolerable. 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      Patient Active Problem List   Diagnosis   • Migraine headache   • IUD (intrauterine device) in place       No Known Allergies    Current Outpatient Medications on File Prior to Visit   Medication Sig Dispense Refill   • ibuprofen (ADVIL,MOTRIN) 200 MG tablet Take 200 mg by mouth Every 6 (Six) Hours As Needed.     • Levonorgestrel (KYLEENA IU) by Intrauterine route.     • metroNIDAZOLE (Flagyl) 500 MG tablet Take 1 tablet by mouth 3 (Three) Times a Day for 14 days. 42 tablet 0   • topiramate (TOPAMAX) 25 MG capsule (sprinkle) Take 25 mg by mouth As Needed.     • fluconazole (DIFLUCAN) 150 MG tablet TAKE 1 TABLET BY MOUTH EVERY DAY FOR 7 DAYS THEN WEEKLY FOR 5 WEEKS       No current facility-administered medications on file prior to visit.       History reviewed. No pertinent past medical history.    Family History   Problem Relation Age of Onset   • Diabetes Mother    • Cancer Father         melanoma   • Hyperlipidemia Father    • Hypertension Father    • Epilepsy Father    • Migraines Paternal Aunt    • Diabetes Maternal Grandmother    • Cancer Paternal Grandfather         melanoma       Social History     Socioeconomic History   • Marital status: Single     Spouse name: Not on file   • Number of children: Not on file   • Years of education: Not on file   • Highest education level: Not on  file   Tobacco Use   • Smoking status: Former Smoker   • Smokeless tobacco: Never Used   • Tobacco comment: vaping   Vaping Use   • Vaping Use: Former   Substance and Sexual Activity   • Alcohol use: Yes   • Drug use: Never   • Sexual activity: Yes       Past Surgical History:   Procedure Laterality Date   • EAR TUBES     • WISDOM TOOTH EXTRACTION           The following portions of the patient's history were reviewed and updated as appropriate: problem list, allergies, current medications, past medical history, past family history, past social history and past surgical history.    Review of Systems   Gastrointestinal: Negative for abdominal pain and diarrhea.       Immunization History   Administered Date(s) Administered   • COVID-19 (PFIZER) 12/22/2020, 01/12/2021   • DTaP 1998, 1998, 01/21/1999, 01/13/2000, 07/02/2002   • Flu Vaccine Intradermal Quad 18-64YR 10/23/2007, 10/30/2015   • Flu Vaccine Quad PF >18YRS 10/24/2001   • Flu Vaccine Split Quad 02/28/2017, 10/06/2017, 09/20/2018, 09/24/2019   • Flulaval/Fluarix/Fluzone Quad 02/28/2017, 10/06/2017   • HPV Quadrivalent 07/13/2009, 09/14/2009, 01/15/2010   • Hep A / Hep B 01/02/2018, 02/07/2018, 07/12/2018   • Hep A, 2 Dose 10/24/2008, 07/13/2009, 07/31/2009   • Hep B, Adolescent or Pediatric 1998, 1998, 01/21/1999   • Hib (HbOC) 1998, 1998, 01/21/1999, 01/13/2000   • Hib (PRP-OMP) 1998, 1998, 01/21/1999, 01/13/2000   • Hpv9 11/09/2017   • IPV 1998, 1998, 01/13/2000, 07/12/2002   • Influenza LAIV (Nasal) 10/24/2008   • Influenza Quad Vaccine (Inpatient) 10/24/2001   • MMR 10/11/1999, 07/12/2002   • Meningococcal MCV4P (Menactra) 09/14/2009, 11/09/2017   • OPV 1998, 1998, 01/13/2000   • PPD Test 02/28/2017, 11/09/2017   • Pneumococcal Conjugate 13-Valent (PCV13) 11/22/2000   • Tdap 07/13/2009, 12/01/2017   • Varicella 07/15/1999, 01/13/2000, 10/24/2008, 02/07/2018       Objective   Vitals:     "04/19/21 1002 04/19/21 1021   BP:  110/72   Pulse: 69    Resp: 15    Weight: 62.1 kg (137 lb)    Height: 167.6 cm (65.98\")      Body mass index is 22.12 kg/m².  Physical Exam  Vitals reviewed.   Constitutional:       Appearance: Normal appearance.   HENT:      Head: Normocephalic and atraumatic.   Cardiovascular:      Rate and Rhythm: Normal rate and regular rhythm.      Heart sounds: Normal heart sounds.   Abdominal:      General: Abdomen is flat. Bowel sounds are normal. There is no distension.      Palpations: Abdomen is soft.      Tenderness: There is no abdominal tenderness. There is no guarding.   Neurological:      Mental Status: She is alert.         Assessment/Plan   Diagnoses and all orders for this visit:    1. C. difficile enteritis (Primary)    Continue treatment, discussed importance of compliance and finishing Metronidazole to completion. She will start probiotics after she completes abx course. Wait until completing abx to start Diflucan for chronic vaginal candidiasis. Call if sx return or worsen.     Reviewed note from ENT scheduled for tonsillectomy/adenoidectomy in July.          "

## 2021-04-28 ENCOUNTER — APPOINTMENT (OUTPATIENT)
Dept: CT IMAGING | Facility: HOSPITAL | Age: 21
End: 2021-04-28

## 2021-06-02 ENCOUNTER — TELEPHONE (OUTPATIENT)
Dept: INTERNAL MEDICINE | Facility: CLINIC | Age: 21
End: 2021-06-02

## 2021-06-02 ENCOUNTER — OFFICE VISIT (OUTPATIENT)
Dept: INTERNAL MEDICINE | Facility: CLINIC | Age: 21
End: 2021-06-02

## 2021-06-02 VITALS — HEIGHT: 66 IN | TEMPERATURE: 97.3 F | BODY MASS INDEX: 23.3 KG/M2 | WEIGHT: 145 LBS

## 2021-06-02 DIAGNOSIS — J02.9 SORE THROAT: ICD-10-CM

## 2021-06-02 DIAGNOSIS — J02.9 SORE THROAT: Primary | ICD-10-CM

## 2021-06-02 LAB
EXPIRATION DATE: NORMAL
INTERNAL CONTROL: NORMAL
Lab: NORMAL
S PYO AG THROAT QL: NEGATIVE

## 2021-06-02 PROCEDURE — 87880 STREP A ASSAY W/OPTIC: CPT | Performed by: PHYSICIAN ASSISTANT

## 2021-06-02 PROCEDURE — 99213 OFFICE O/P EST LOW 20 MIN: CPT | Performed by: PHYSICIAN ASSISTANT

## 2021-06-02 NOTE — PROGRESS NOTES
Subjective   Chief Complaint   Patient presents with   • Sore Throat       History of Present Illness     Pt is here today with sore throat for the last 2 days. She does have some nasal drainage and congestion. Denies fever and chills. Very concerned about another episode of strep pharyngitis. Her fiance also has a sore throat with fever and chills.      Patient Active Problem List   Diagnosis   • Migraine headache   • IUD (intrauterine device) in place       No Known Allergies    Current Outpatient Medications on File Prior to Visit   Medication Sig Dispense Refill   • fluconazole (DIFLUCAN) 150 MG tablet TAKE 1 TABLET BY MOUTH EVERY DAY FOR 7 DAYS THEN WEEKLY FOR 5 WEEKS     • ibuprofen (ADVIL,MOTRIN) 200 MG tablet Take 200 mg by mouth Every 6 (Six) Hours As Needed.     • Levonorgestrel (KYLEENA IU) by Intrauterine route.     • topiramate (TOPAMAX) 25 MG capsule (sprinkle) Take 25 mg by mouth As Needed.       No current facility-administered medications on file prior to visit.       History reviewed. No pertinent past medical history.    Family History   Problem Relation Age of Onset   • Diabetes Mother    • Cancer Father         melanoma   • Hyperlipidemia Father    • Hypertension Father    • Epilepsy Father    • Migraines Paternal Aunt    • Diabetes Maternal Grandmother    • Cancer Paternal Grandfather         melanoma       Social History     Socioeconomic History   • Marital status: Single     Spouse name: Not on file   • Number of children: Not on file   • Years of education: Not on file   • Highest education level: Not on file   Tobacco Use   • Smoking status: Former Smoker   • Smokeless tobacco: Never Used   • Tobacco comment: vaping   Vaping Use   • Vaping Use: Former   Substance and Sexual Activity   • Alcohol use: Yes   • Drug use: Never   • Sexual activity: Yes       Past Surgical History:   Procedure Laterality Date   • EAR TUBES     • WISDOM TOOTH EXTRACTION       The following portions of the  "patient's history were reviewed and updated as appropriate: problem list, allergies, current medications, past medical history, past family history, past social history and past surgical history.    Review of Systems   Constitutional: Negative for chills and fever.   HENT: Positive for congestion and sore throat.    Respiratory: Negative for cough.        Immunization History   Administered Date(s) Administered   • COVID-19 (PFIZER) 12/22/2020, 01/12/2021   • DTaP 1998, 1998, 01/21/1999, 01/13/2000, 07/02/2002   • Flu Vaccine Intradermal Quad 18-64YR 10/23/2007, 10/30/2015   • Flu Vaccine Quad PF >18YRS 10/24/2001   • Flu Vaccine Split Quad 02/28/2017, 10/06/2017, 09/20/2018, 09/24/2019   • Flulaval/Fluarix/Fluzone Quad 02/28/2017, 10/06/2017   • HPV Quadrivalent 07/13/2009, 09/14/2009, 01/15/2010   • Hep A / Hep B 01/02/2018, 02/07/2018, 07/12/2018   • Hep A, 2 Dose 10/24/2008, 07/13/2009, 07/31/2009   • Hep B, Adolescent or Pediatric 1998, 1998, 01/21/1999   • Hib (HbOC) 1998, 1998, 01/21/1999, 01/13/2000   • Hib (PRP-OMP) 1998, 1998, 01/21/1999, 01/13/2000   • Hpv9 11/09/2017   • IPV 1998, 1998, 01/13/2000, 07/12/2002   • Influenza LAIV (Nasal) 10/24/2008   • Influenza Quad Vaccine (Inpatient) 10/24/2001   • MMR 10/11/1999, 07/12/2002   • Meningococcal MCV4P (Menactra) 09/14/2009, 11/09/2017   • OPV 1998, 1998, 01/13/2000   • PPD Test 02/28/2017, 11/09/2017   • Pneumococcal Conjugate 13-Valent (PCV13) 11/22/2000   • Tdap 07/13/2009, 12/01/2017   • Varicella 07/15/1999, 01/13/2000, 10/24/2008, 02/07/2018       Objective   Vitals:    06/02/21 1135   Temp: 97.3 °F (36.3 °C)   Weight: 65.8 kg (145 lb)   Height: 167.6 cm (65.98\")     Body mass index is 23.42 kg/m².  Physical Exam  Vitals reviewed.   Constitutional:       Appearance: She is well-developed.   HENT:      Head: Normocephalic and atraumatic.   Cardiovascular:      Rate and Rhythm: " Normal rate and regular rhythm.      Heart sounds: Normal heart sounds, S1 normal and S2 normal.   Pulmonary:      Effort: Pulmonary effort is normal.      Breath sounds: Normal breath sounds.   Lymphadenopathy:      Cervical: No cervical adenopathy.   Skin:     General: Skin is warm.   Neurological:      General: No focal deficit present.      Mental Status: She is alert and oriented to person, place, and time.   Psychiatric:         Mood and Affect: Mood normal.         Behavior: Behavior normal.         Thought Content: Thought content normal.         Judgment: Judgment normal.       Assessment/Plan   Diagnoses and all orders for this visit:    1. Sore throat (Primary)      Rapid strep is negative, will send out throat culture as well. Treat sx at this time.

## 2021-06-02 NOTE — TELEPHONE ENCOUNTER
Caller: Tran Thompson    Relationship: Self    Best call back number: [phone] (H)    What medication are you requesting:SOMETHING FOR SORE THROAT    What are your current symptoms: SORE THROAT/ STUFFY NOSE    How long have you been experiencing symptoms:SORE THROAT SINCE 6/1/21 STUFFY NOSE SINCE THIS MORNING    Have you had these symptoms before:    [x] Yes  [] No    Have you been treated for these symptoms before:   [x] Yes  [] No    If a prescription is needed, what is your preferred pharmacy and phone number:    [facility] - [facility]  [phone]    Additional notes: PATIENT STATES SHE HAS HAD STREP THROAT SEVERAL TIMES, AND KATYA WRIGHT PA-C KNOWS HER HISTORY. PLEASE ADVISE. THANK YOU.

## 2021-06-04 LAB
BACTERIA SPEC RESP CULT: NORMAL
BACTERIA SPEC RESP CULT: NORMAL
Lab: NORMAL

## 2021-07-07 ENCOUNTER — TRANSCRIBE ORDERS (OUTPATIENT)
Dept: ADMINISTRATIVE | Facility: HOSPITAL | Age: 21
End: 2021-07-07

## 2021-07-07 DIAGNOSIS — Z01.818 OTHER SPECIFIED PRE-OPERATIVE EXAMINATION: Primary | ICD-10-CM

## 2021-07-08 ENCOUNTER — APPOINTMENT (OUTPATIENT)
Dept: LAB | Facility: HOSPITAL | Age: 21
End: 2021-07-08

## 2021-07-10 ENCOUNTER — LAB (OUTPATIENT)
Dept: LAB | Facility: HOSPITAL | Age: 21
End: 2021-07-10

## 2021-07-10 DIAGNOSIS — Z01.818 OTHER SPECIFIED PRE-OPERATIVE EXAMINATION: ICD-10-CM

## 2021-07-10 LAB — SARS-COV-2 ORF1AB RESP QL NAA+PROBE: NOT DETECTED

## 2021-07-10 PROCEDURE — U0004 COV-19 TEST NON-CDC HGH THRU: HCPCS

## 2021-07-10 PROCEDURE — C9803 HOPD COVID-19 SPEC COLLECT: HCPCS

## 2021-07-13 ENCOUNTER — ANESTHESIA EVENT (OUTPATIENT)
Dept: PERIOP | Facility: HOSPITAL | Age: 21
End: 2021-07-13

## 2021-07-13 ENCOUNTER — ANESTHESIA (OUTPATIENT)
Dept: PERIOP | Facility: HOSPITAL | Age: 21
End: 2021-07-13

## 2021-07-13 ENCOUNTER — HOSPITAL ENCOUNTER (OUTPATIENT)
Facility: HOSPITAL | Age: 21
Setting detail: HOSPITAL OUTPATIENT SURGERY
Discharge: HOME | End: 2021-07-13
Attending: OTOLARYNGOLOGY | Admitting: OTOLARYNGOLOGY

## 2021-07-13 VITALS
HEIGHT: 66 IN | HEART RATE: 82 BPM | SYSTOLIC BLOOD PRESSURE: 122 MMHG | TEMPERATURE: 98.2 F | DIASTOLIC BLOOD PRESSURE: 69 MMHG | OXYGEN SATURATION: 89 % | WEIGHT: 144 LBS | BODY MASS INDEX: 23.14 KG/M2 | RESPIRATION RATE: 16 BRPM

## 2021-07-13 DIAGNOSIS — J35.01 CHRONIC TONSILLITIS: ICD-10-CM

## 2021-07-13 LAB
B-HCG UR QL: NEGATIVE
INTERNAL NEGATIVE CONTROL: NORMAL
INTERNAL POSITIVE CONTROL: NORMAL
Lab: NORMAL

## 2021-07-13 PROCEDURE — 25010000002 ROPIVACAINE PER 1 MG: Performed by: OTOLARYNGOLOGY

## 2021-07-13 PROCEDURE — 25010000002 FENTANYL CITRATE (PF) 50 MCG/ML SOLUTION: Performed by: NURSE ANESTHETIST, CERTIFIED REGISTERED

## 2021-07-13 PROCEDURE — 81025 URINE PREGNANCY TEST: CPT | Performed by: ANESTHESIOLOGY

## 2021-07-13 PROCEDURE — 25010000002 ONDANSETRON PER 1 MG: Performed by: NURSE ANESTHETIST, CERTIFIED REGISTERED

## 2021-07-13 PROCEDURE — 25010000002 PROPOFOL 10 MG/ML EMULSION: Performed by: NURSE ANESTHETIST, CERTIFIED REGISTERED

## 2021-07-13 PROCEDURE — 25010000002 DEXAMETHASONE PER 1 MG: Performed by: NURSE ANESTHETIST, CERTIFIED REGISTERED

## 2021-07-13 PROCEDURE — 88304 TISSUE EXAM BY PATHOLOGIST: CPT | Performed by: OTOLARYNGOLOGY

## 2021-07-13 PROCEDURE — 25010000002 HYDROMORPHONE PER 4 MG: Performed by: NURSE ANESTHETIST, CERTIFIED REGISTERED

## 2021-07-13 PROCEDURE — 25010000002 NEOSTIGMINE 5 MG/10ML SOLUTION: Performed by: NURSE ANESTHETIST, CERTIFIED REGISTERED

## 2021-07-13 RX ORDER — SCOLOPAMINE TRANSDERMAL SYSTEM 1 MG/1
1 PATCH, EXTENDED RELEASE TRANSDERMAL ONCE
Status: DISCONTINUED | OUTPATIENT
Start: 2021-07-13 | End: 2021-07-13 | Stop reason: HOSPADM

## 2021-07-13 RX ORDER — PROMETHAZINE HYDROCHLORIDE 25 MG/1
25 TABLET ORAL ONCE AS NEEDED
Status: DISCONTINUED | OUTPATIENT
Start: 2021-07-13 | End: 2021-07-13 | Stop reason: HOSPADM

## 2021-07-13 RX ORDER — SODIUM CHLORIDE 0.9 % (FLUSH) 0.9 %
3 SYRINGE (ML) INJECTION EVERY 12 HOURS SCHEDULED
Status: DISCONTINUED | OUTPATIENT
Start: 2021-07-13 | End: 2021-07-13 | Stop reason: HOSPADM

## 2021-07-13 RX ORDER — ONDANSETRON 2 MG/ML
INJECTION INTRAMUSCULAR; INTRAVENOUS AS NEEDED
Status: DISCONTINUED | OUTPATIENT
Start: 2021-07-13 | End: 2021-07-13 | Stop reason: SURG

## 2021-07-13 RX ORDER — SODIUM CHLORIDE 0.9 % (FLUSH) 0.9 %
3-10 SYRINGE (ML) INJECTION AS NEEDED
Status: DISCONTINUED | OUTPATIENT
Start: 2021-07-13 | End: 2021-07-13 | Stop reason: HOSPADM

## 2021-07-13 RX ORDER — MAGNESIUM HYDROXIDE 1200 MG/15ML
LIQUID ORAL AS NEEDED
Status: DISCONTINUED | OUTPATIENT
Start: 2021-07-13 | End: 2021-07-13 | Stop reason: HOSPADM

## 2021-07-13 RX ORDER — PROMETHAZINE HYDROCHLORIDE 25 MG/1
25 SUPPOSITORY RECTAL ONCE AS NEEDED
Status: DISCONTINUED | OUTPATIENT
Start: 2021-07-13 | End: 2021-07-13 | Stop reason: HOSPADM

## 2021-07-13 RX ORDER — HYDROMORPHONE HYDROCHLORIDE 1 MG/ML
0.5 INJECTION, SOLUTION INTRAMUSCULAR; INTRAVENOUS; SUBCUTANEOUS
Status: DISCONTINUED | OUTPATIENT
Start: 2021-07-13 | End: 2021-07-13 | Stop reason: HOSPADM

## 2021-07-13 RX ORDER — LIDOCAINE HYDROCHLORIDE 10 MG/ML
0.5 INJECTION, SOLUTION EPIDURAL; INFILTRATION; INTRACAUDAL; PERINEURAL ONCE AS NEEDED
Status: DISCONTINUED | OUTPATIENT
Start: 2021-07-13 | End: 2021-07-13 | Stop reason: HOSPADM

## 2021-07-13 RX ORDER — FLUMAZENIL 0.1 MG/ML
0.2 INJECTION INTRAVENOUS AS NEEDED
Status: DISCONTINUED | OUTPATIENT
Start: 2021-07-13 | End: 2021-07-13 | Stop reason: HOSPADM

## 2021-07-13 RX ORDER — FENTANYL CITRATE 50 UG/ML
INJECTION, SOLUTION INTRAMUSCULAR; INTRAVENOUS AS NEEDED
Status: DISCONTINUED | OUTPATIENT
Start: 2021-07-13 | End: 2021-07-13 | Stop reason: SURG

## 2021-07-13 RX ORDER — OXYCODONE AND ACETAMINOPHEN 10; 325 MG/1; MG/1
1 TABLET ORAL EVERY 4 HOURS PRN
Status: DISCONTINUED | OUTPATIENT
Start: 2021-07-13 | End: 2021-07-13 | Stop reason: HOSPADM

## 2021-07-13 RX ORDER — PROPOFOL 10 MG/ML
VIAL (ML) INTRAVENOUS AS NEEDED
Status: DISCONTINUED | OUTPATIENT
Start: 2021-07-13 | End: 2021-07-13 | Stop reason: SURG

## 2021-07-13 RX ORDER — FAMOTIDINE 10 MG/ML
20 INJECTION, SOLUTION INTRAVENOUS ONCE
Status: COMPLETED | OUTPATIENT
Start: 2021-07-13 | End: 2021-07-13

## 2021-07-13 RX ORDER — NEOSTIGMINE METHYLSULFATE 0.5 MG/ML
INJECTION, SOLUTION INTRAVENOUS AS NEEDED
Status: DISCONTINUED | OUTPATIENT
Start: 2021-07-13 | End: 2021-07-13 | Stop reason: SURG

## 2021-07-13 RX ORDER — DIPHENHYDRAMINE HCL 25 MG
25 CAPSULE ORAL
Status: DISCONTINUED | OUTPATIENT
Start: 2021-07-13 | End: 2021-07-13 | Stop reason: HOSPADM

## 2021-07-13 RX ORDER — SODIUM CHLORIDE, SODIUM LACTATE, POTASSIUM CHLORIDE, CALCIUM CHLORIDE 600; 310; 30; 20 MG/100ML; MG/100ML; MG/100ML; MG/100ML
INJECTION, SOLUTION INTRAVENOUS CONTINUOUS PRN
Status: DISCONTINUED | OUTPATIENT
Start: 2021-07-13 | End: 2021-07-13

## 2021-07-13 RX ORDER — EPHEDRINE SULFATE 50 MG/ML
5 INJECTION, SOLUTION INTRAVENOUS ONCE AS NEEDED
Status: DISCONTINUED | OUTPATIENT
Start: 2021-07-13 | End: 2021-07-13 | Stop reason: HOSPADM

## 2021-07-13 RX ORDER — FENTANYL CITRATE 50 UG/ML
50 INJECTION, SOLUTION INTRAMUSCULAR; INTRAVENOUS
Status: DISCONTINUED | OUTPATIENT
Start: 2021-07-13 | End: 2021-07-13 | Stop reason: HOSPADM

## 2021-07-13 RX ORDER — DIPHENHYDRAMINE HYDROCHLORIDE 50 MG/ML
12.5 INJECTION INTRAMUSCULAR; INTRAVENOUS
Status: DISCONTINUED | OUTPATIENT
Start: 2021-07-13 | End: 2021-07-13 | Stop reason: HOSPADM

## 2021-07-13 RX ORDER — NALOXONE HCL 0.4 MG/ML
0.2 VIAL (ML) INJECTION AS NEEDED
Status: DISCONTINUED | OUTPATIENT
Start: 2021-07-13 | End: 2021-07-13 | Stop reason: HOSPADM

## 2021-07-13 RX ORDER — LABETALOL HYDROCHLORIDE 5 MG/ML
5 INJECTION, SOLUTION INTRAVENOUS
Status: DISCONTINUED | OUTPATIENT
Start: 2021-07-13 | End: 2021-07-13 | Stop reason: HOSPADM

## 2021-07-13 RX ORDER — ONDANSETRON 2 MG/ML
4 INJECTION INTRAMUSCULAR; INTRAVENOUS ONCE AS NEEDED
Status: DISCONTINUED | OUTPATIENT
Start: 2021-07-13 | End: 2021-07-13 | Stop reason: HOSPADM

## 2021-07-13 RX ORDER — GLYCOPYRROLATE 0.2 MG/ML
INJECTION INTRAMUSCULAR; INTRAVENOUS AS NEEDED
Status: DISCONTINUED | OUTPATIENT
Start: 2021-07-13 | End: 2021-07-13 | Stop reason: SURG

## 2021-07-13 RX ORDER — ROCURONIUM BROMIDE 10 MG/ML
INJECTION, SOLUTION INTRAVENOUS AS NEEDED
Status: DISCONTINUED | OUTPATIENT
Start: 2021-07-13 | End: 2021-07-13 | Stop reason: SURG

## 2021-07-13 RX ORDER — MIDAZOLAM HYDROCHLORIDE 1 MG/ML
1 INJECTION INTRAMUSCULAR; INTRAVENOUS
Status: DISCONTINUED | OUTPATIENT
Start: 2021-07-13 | End: 2021-07-13 | Stop reason: HOSPADM

## 2021-07-13 RX ORDER — ONDANSETRON 8 MG/1
8 TABLET, ORALLY DISINTEGRATING ORAL EVERY 8 HOURS PRN
Qty: 10 TABLET | Refills: 0 | Status: SHIPPED | OUTPATIENT
Start: 2021-07-13 | End: 2021-08-12

## 2021-07-13 RX ORDER — HYDROCODONE BITARTRATE AND ACETAMINOPHEN 7.5; 325 MG/1; MG/1
1 TABLET ORAL ONCE AS NEEDED
Status: COMPLETED | OUTPATIENT
Start: 2021-07-13 | End: 2021-07-13

## 2021-07-13 RX ORDER — LIDOCAINE HYDROCHLORIDE 20 MG/ML
INJECTION, SOLUTION INFILTRATION; PERINEURAL AS NEEDED
Status: DISCONTINUED | OUTPATIENT
Start: 2021-07-13 | End: 2021-07-13 | Stop reason: SURG

## 2021-07-13 RX ORDER — SODIUM CHLORIDE, SODIUM LACTATE, POTASSIUM CHLORIDE, CALCIUM CHLORIDE 600; 310; 30; 20 MG/100ML; MG/100ML; MG/100ML; MG/100ML
9 INJECTION, SOLUTION INTRAVENOUS CONTINUOUS
Status: DISCONTINUED | OUTPATIENT
Start: 2021-07-13 | End: 2021-07-13 | Stop reason: HOSPADM

## 2021-07-13 RX ORDER — ROPIVACAINE HYDROCHLORIDE 5 MG/ML
INJECTION, SOLUTION EPIDURAL; INFILTRATION; PERINEURAL AS NEEDED
Status: DISCONTINUED | OUTPATIENT
Start: 2021-07-13 | End: 2021-07-13 | Stop reason: HOSPADM

## 2021-07-13 RX ORDER — DEXAMETHASONE SODIUM PHOSPHATE 10 MG/ML
INJECTION INTRAMUSCULAR; INTRAVENOUS AS NEEDED
Status: DISCONTINUED | OUTPATIENT
Start: 2021-07-13 | End: 2021-07-13 | Stop reason: SURG

## 2021-07-13 RX ORDER — IBUPROFEN 600 MG/1
600 TABLET ORAL ONCE AS NEEDED
Status: DISCONTINUED | OUTPATIENT
Start: 2021-07-13 | End: 2021-07-13 | Stop reason: HOSPADM

## 2021-07-13 RX ADMIN — NEOSTIGMINE METHYLSULFATE 3 MG: 0.5 INJECTION INTRAVENOUS at 12:47

## 2021-07-13 RX ADMIN — LIDOCAINE HYDROCHLORIDE 100 MG: 20 INJECTION, SOLUTION INFILTRATION; PERINEURAL at 12:04

## 2021-07-13 RX ADMIN — ROCURONIUM BROMIDE 30 MG: 50 INJECTION INTRAVENOUS at 12:04

## 2021-07-13 RX ADMIN — HYDROCODONE BITARTRATE AND ACETAMINOPHEN 1 TABLET: 7.5; 325 TABLET ORAL at 13:33

## 2021-07-13 RX ADMIN — FENTANYL CITRATE 50 MCG: 50 INJECTION, SOLUTION INTRAMUSCULAR; INTRAVENOUS at 13:18

## 2021-07-13 RX ADMIN — FENTANYL CITRATE 50 MCG: 50 INJECTION, SOLUTION INTRAMUSCULAR; INTRAVENOUS at 13:46

## 2021-07-13 RX ADMIN — ONDANSETRON 4 MG: 2 INJECTION INTRAMUSCULAR; INTRAVENOUS at 12:10

## 2021-07-13 RX ADMIN — DEXAMETHASONE SODIUM PHOSPHATE 10 MG: 10 INJECTION INTRAMUSCULAR; INTRAVENOUS at 12:09

## 2021-07-13 RX ADMIN — GLYCOPYRROLATE 0.4 MG: 0.2 INJECTION INTRAMUSCULAR; INTRAVENOUS at 12:46

## 2021-07-13 RX ADMIN — PROPOFOL 200 MG: 10 INJECTION, EMULSION INTRAVENOUS at 12:04

## 2021-07-13 RX ADMIN — FAMOTIDINE 20 MG: 10 INJECTION INTRAVENOUS at 10:48

## 2021-07-13 RX ADMIN — PROPOFOL 40 MG: 10 INJECTION, EMULSION INTRAVENOUS at 12:14

## 2021-07-13 RX ADMIN — HYDROMORPHONE HYDROCHLORIDE 0.5 MG: 1 INJECTION, SOLUTION INTRAMUSCULAR; INTRAVENOUS; SUBCUTANEOUS at 14:01

## 2021-07-13 RX ADMIN — FENTANYL CITRATE 100 MCG: 50 INJECTION INTRAMUSCULAR; INTRAVENOUS at 12:04

## 2021-07-13 RX ADMIN — SCOLOPAMINE TRANSDERMAL SYSTEM 1 PATCH: 1 PATCH, EXTENDED RELEASE TRANSDERMAL at 10:48

## 2021-07-13 RX ADMIN — SODIUM CHLORIDE, POTASSIUM CHLORIDE, SODIUM LACTATE AND CALCIUM CHLORIDE 9 ML/HR: 600; 310; 30; 20 INJECTION, SOLUTION INTRAVENOUS at 10:47

## 2021-07-13 RX ADMIN — HYDROMORPHONE HYDROCHLORIDE 0.5 MG: 1 INJECTION, SOLUTION INTRAMUSCULAR; INTRAVENOUS; SUBCUTANEOUS at 13:27

## 2021-07-13 RX ADMIN — HYDROMORPHONE HYDROCHLORIDE 0.5 MG: 1 INJECTION, SOLUTION INTRAMUSCULAR; INTRAVENOUS; SUBCUTANEOUS at 13:39

## 2021-07-13 NOTE — OP NOTE
[facility] OPERATIVE NOTE  7/13/2021    NAME: Tran Thompson    YOB: 2000  MRN: [id number]    PRE-OPERATIVE DIAGNOSIS:  Chronic Tonsillitis      POST-OPERATIVE DIAGNOSIS: same    PROCEDURE PERFORMED: Tonsillectomy    SURGEON: Duc Alvarez MD    ASSISTANT(S): None    ANESTHESIA: General Anesthesia via Endotracheal Tube    INDICATIONS: The patient is a 21 y.o. female with chronic and recurrent tonsillitis.  She also has frequent tonsil stones.  She has been treated multiple times with antibiotics and has actually developed C. difficile due to chronic antibiotic usage.    FINDINGS: Both tonsils were quite inflamed and enlarged with numerous tonsilloliths.    PROCEDURE: Patient was brought to the operating room.  She was given general anesthesia and orally intubated.  Surgical timeout was performed and all patient data verified.  Table is rotated 90 degrees and a shoulder roll was placed.  Towels were placed over the face.  McIvor mouth gag was introduced and the endotracheal tube was secured.  The right tonsil was grasped superiorly and retracted medially.  Dissection was performed with needle tip Bovie working laterally and inferiorly until it was removed in 1 piece.  Hemostasis was controlled with application of tonsil sponges moistened in ropivacaine and suction cautery as needed.  Left tonsil was removed in the same manner.    All operative sites were inspected.  No bleeding was seen.  Instrumentation was removed.  Shoulder roll was taken out.  She was extubated the operating room and transferred to recovery in stable condition.     SPECIMENS: Left and right tonsil sent separately    COMPLICATIONS: NONE    ESTIMATED BLOOD LOSS: 20 cc    Duc Alvarez MD  7/13/2021

## 2021-07-13 NOTE — ANESTHESIA PROCEDURE NOTES
Airway  Urgency: elective    Date/Time: 7/13/2021 12:06 PM  Airway not difficult    General Information and Staff    Patient location during procedure: OR  CRNA: Ce Sanches CRNA    Indications and Patient Condition  Indications for airway management: airway protection    Preoxygenated: yes  Mask difficulty assessment: 1 - vent by mask    Final Airway Details  Final airway type: endotracheal airway      Successful airway: ETT and ANSHUL tube  Cuffed: yes   Successful intubation technique: direct laryngoscopy  Endotracheal tube insertion site: oral  Blade: Nino  Blade size: 3  ETT size (mm): 7.0  Cormack-Lehane Classification: grade I - full view of glottis  Placement verified by: chest auscultation and capnometry   Measured from: lips  ETT/EBT  to lips (cm): 21  Number of attempts at approach: 1  Assessment: lips, teeth, and gum same as pre-op and atraumatic intubation    Additional Comments   ett cuff up at MOP

## 2021-07-13 NOTE — ANESTHESIA POSTPROCEDURE EVALUATION
"Patient: Tran Thompson    Procedure Summary     Date: 07/13/21 Room / Location: [facility] OR  / [facility] MAIN OR    Anesthesia Start: 1158 Anesthesia Stop: 1309    Procedure: TONSILLECTOMY (Bilateral Throat) Diagnosis:     Surgeons: Duc Alvarez MD Provider: Vivian Edmonds MD    Anesthesia Type: general ASA Status: 1          Anesthesia Type: general    Vitals  Vitals Value Taken Time   /89 07/13/21 1400   Temp 37 °C (98.6 °F) 07/13/21 1306   Pulse 69 07/13/21 1409   Resp 16 07/13/21 1400   SpO2 92 % 07/13/21 1409   Vitals shown include unvalidated device data.        Post Anesthesia Care and Evaluation    Patient location during evaluation: PACU  Patient participation: complete - patient participated  Level of consciousness: awake  Pain management: adequate  Airway patency: patent  Anesthetic complications: No anesthetic complications    Cardiovascular status: acceptable  Respiratory status: acceptable  Hydration status: acceptable    Comments: /89   Pulse 89   Temp 37 °C (98.6 °F) (Oral)   Resp 16   Ht 167.6 cm (66\")   Wt 65.3 kg (144 lb)   SpO2 93%   BMI 23.24 kg/m²       "

## 2021-07-13 NOTE — ANESTHESIA PREPROCEDURE EVALUATION
Anesthesia Evaluation     Patient summary reviewed and Nursing notes reviewed   NPO Solid Status: > 8 hours  NPO Liquid Status: > 2 hours           Airway   Mallampati: I  Dental - normal exam     Pulmonary - negative pulmonary ROS and normal exam   Cardiovascular - negative cardio ROS and normal exam        Neuro/Psych  (+) headaches,     GI/Hepatic/Renal/Endo - negative ROS     Musculoskeletal (-) negative ROS    Abdominal    Substance History - negative use     OB/GYN          Other - negative ROS                     Anesthesia Plan    ASA 1     general       Anesthetic plan, all risks, benefits, and alternatives have been provided, discussed and informed consent has been obtained with: patient.

## 2021-07-13 NOTE — DISCHARGE INSTRUCTIONS
Dr. Duc Alvarez  [address] [address]  [phone]    Post-operative Instructions for Tonsillectomy / Adenoidectomy      Activity  Stay indoors the first 3 days. No strenuous activities for 7-10 days. May be up to the bathroom, kitchen, or family room for eating, reading watching TV and quiet play. May return to school or work in 7-10 days.    Diet  Encourage lots of fluids while awake. This is important to prevent dehydration and delayed bleeding which is possible for up to 10 days.  Soft foods may be given as well. suggestions include water, ice cream, milk shakes, sodas, apple juice (avoid citrus juices initially), jello, scrambled eggs, yogurt, oatmeal, custard, mashed potatoes and warm soups. Later on advance to more solid food when there is less throat discomfort.    Pain  It is normal to have moderate to severe throat, neck and ear pain. This may feel worse 3-4 days after surgery. Pain may continue for 10-14 days for adults. Eating, swallowing and speaking will be painful. Do not yell, scream or use your voice excessively. A heating pad to the neck or ears may be helpful.    Medication  Every attempt will be made to give liquid pain medication and antibiotics. Take antibiotics until it is gone, Use pain medication as directed. Suppositories may be given for nausea and vomiting. Low grade fever < 101 is normal. Use liquid tylenol dosed to patients weight. Call if temperature exceeds 102.    Hygiene  Brush your teeth after meals. Gentle gargling with warm salt water (one teaspoon of salt to a pint of water) may help. An unpleasant odor may come from the throat or nose. This is normal and will resolve in 10 days. A white or yellow membrane will form where the tonsils were and gradually fade.    Nausea  During the first day or two some nausea and vomitting may occur. This is probably from general anesthesia. If you were given a prescription for phenergan use it as directed. It is important to maintain oral  fluids to prevent dehydration and bleeding. Sometimes the pain medication may produce nausea. Try using a little less often or reduce the dosage. If the nausea or vomiting continue call the office for instructions.    Bleeding  If bleeding occurs from the nose or mouth spontaneously or after coughing place the head down. Put an ice bag to the neck and catch the blood in a towel or basin. If bleeding does not stop in 10 minutes call [phone] for instructions and be prepared to go to the Emergency Room. The incidence of post-operative bleeding is quiet low. It is more likely when there has been poor oral fluid intake.    Follow-up  Please call the office in a couple of days to arrange for a follow up between 7-10 days after the date of surgery.    Scopolamine Patch  This patch has been applied to the skin behind one of your ears.  It may stay in place up to 24 hours. You may remove it at any time after your surgery; however, it should be removed after you are up and walking around the next day.  This medicine reduces stomach upset. Side effects may include: dry mouth, dizziness, sleepiness, constipation, or upset stomach.  An allergy would show up as: a rash, itching, wheezing or shortness of breath.  Follow these instructions:  1. Do not drink alcohol, drive or operate machinery while taking this medicine.  2. Wear only 1 patch at a time. You can leave the patch on for up to 24 hours.  3. When you remove the patch, fold it in half with the sticky sides together and throw it away. Wash your hands and the area under the patch.  4. Do not touch your eye with your hand if it has touched the patch.  5. Wash your hands well before and after touching the patch.  6. Sit or stand slowly to avoid dizziness.  Call your doctor if you have:  1. Any sign of allergy  2. No relief  3. Trouble passing urine  4. Any new or severe symptoms

## 2021-07-14 LAB
LAB AP CASE REPORT: NORMAL
PATH REPORT.FINAL DX SPEC: NORMAL
PATH REPORT.GROSS SPEC: NORMAL

## 2021-08-04 ENCOUNTER — OFFICE VISIT (OUTPATIENT)
Dept: INTERNAL MEDICINE | Facility: CLINIC | Age: 21
End: 2021-08-04

## 2021-08-04 VITALS
BODY MASS INDEX: 23.11 KG/M2 | OXYGEN SATURATION: 98 % | HEIGHT: 66 IN | SYSTOLIC BLOOD PRESSURE: 128 MMHG | WEIGHT: 143.8 LBS | RESPIRATION RATE: 16 BRPM | TEMPERATURE: 97.3 F | DIASTOLIC BLOOD PRESSURE: 80 MMHG | HEART RATE: 73 BPM

## 2021-08-04 DIAGNOSIS — F41.8 SITUATIONAL ANXIETY: ICD-10-CM

## 2021-08-04 DIAGNOSIS — H81.10 BENIGN PAROXYSMAL POSITIONAL VERTIGO, UNSPECIFIED LATERALITY: Primary | ICD-10-CM

## 2021-08-04 PROCEDURE — 99214 OFFICE O/P EST MOD 30 MIN: CPT | Performed by: PHYSICIAN ASSISTANT

## 2021-08-04 RX ORDER — BUSPIRONE HYDROCHLORIDE 5 MG/1
5 TABLET ORAL 3 TIMES DAILY
Qty: 90 TABLET | Refills: 1 | Status: SHIPPED | OUTPATIENT
Start: 2021-08-04

## 2021-08-04 RX ORDER — BUSPIRONE HYDROCHLORIDE 5 MG/1
5 TABLET ORAL 3 TIMES DAILY
Qty: 90 TABLET | Refills: 1 | Status: SHIPPED | OUTPATIENT
Start: 2021-08-04 | End: 2021-08-04

## 2021-08-04 NOTE — PROGRESS NOTES
Subjective   Chief Complaint   Patient presents with   • Dizziness   • Anxiety       History of Present Illness     Had an episode of dizziness with bending 5 days ago and then started having room spinning sensation that lasted the entire rest of the day. She was unable to drive herself home due to the symptoms.  It has not occurred since Friday. She was unable to drive home.  She has chronic tinnitus but it is not new. She had nausea with it, no vomiting. Had a slight headache afterwards. No double vision or blurred vision. Sx have resolved and not returned. She has some lingering fatigue, but otherwise back to baseline. She has been having situational anxiety and is very worried about her wedding in October and having a panic attack etc during her ceremony.      Patient Active Problem List   Diagnosis   • Migraine headache   • IUD (intrauterine device) in place       No Known Allergies    Current Outpatient Medications on File Prior to Visit   Medication Sig Dispense Refill   • Levonorgestrel (KYLEENA IU) by Intrauterine route.     • ondansetron ODT (Zofran ODT) 8 MG disintegrating tablet Place 1 tablet on the tongue Every 8 (Eight) Hours As Needed for Nausea or Vomiting for up to 30 days. 10 tablet 0   • topiramate (TOPAMAX) 25 MG capsule (sprinkle) Take 25 mg by mouth As Needed.       No current facility-administered medications on file prior to visit.       Past Medical History:   Diagnosis Date   • Anesthesia     BLOOD PRESSURE DROPPED POST-OP WITH EAR TUBES AGE 4   • History of Clostridium difficile colitis     NUMEROUS ANTIBIOTICS WITH STREP HX   • History of strep sore throat     7 TIMES IN 2020   • Migraines        Family History   Problem Relation Age of Onset   • Diabetes Mother    • Cancer Father         melanoma   • Hyperlipidemia Father    • Hypertension Father    • Epilepsy Father    • Migraines Paternal Aunt    • Diabetes Maternal Grandmother    • Cancer Paternal Grandfather         melanoma   •  Malig Hyperthermia Neg Hx        Social History     Socioeconomic History   • Marital status: Single     Spouse name: Not on file   • Number of children: Not on file   • Years of education: Not on file   • Highest education level: Not on file   Tobacco Use   • Smoking status: Former Smoker   • Smokeless tobacco: Never Used   • Tobacco comment: vaping   Vaping Use   • Vaping Use: Former   Substance and Sexual Activity   • Alcohol use: Yes     Comment: OCC/SOCIALLY    • Drug use: Never   • Sexual activity: Yes       Past Surgical History:   Procedure Laterality Date   • EAR TUBES     • TONSILLECTOMY Bilateral 7/13/2021    Procedure: TONSILLECTOMY;  Surgeon: Duc Alvarez MD;  Location: [facility];  Service: ENT;  Laterality: Bilateral;   • WISDOM TOOTH EXTRACTION           The following portions of the patient's history were reviewed and updated as appropriate: problem list, allergies, current medications, past medical history, past family history, past social history and past surgical history.    Review of Systems   Eyes: Negative for blurred vision and double vision.   Neurological: Positive for dizziness and vertigo.   Psychiatric/Behavioral: The patient is nervous/anxious.        Immunization History   Administered Date(s) Administered   • COVID-19 (PFIZER) 12/22/2020, 01/12/2021   • DTaP 1998, 1998, 01/21/1999, 01/13/2000, 07/02/2002   • Flu Vaccine Intradermal Quad 18-64YR 10/23/2007, 10/30/2015   • Flu Vaccine Quad PF >18YRS 10/24/2001   • Flu Vaccine Split Quad 02/28/2017, 10/06/2017, 09/20/2018, 09/24/2019   • Flulaval/Fluarix/Fluzone Quad 02/28/2017, 10/06/2017   • HPV Quadrivalent 07/13/2009, 09/14/2009, 01/15/2010   • Hep A / Hep B 01/02/2018, 02/07/2018, 07/12/2018   • Hep A, 2 Dose 10/24/2008, 07/13/2009, 07/31/2009   • Hep B, Adolescent or Pediatric 1998, 1998, 01/21/1999   • Hib (HbOC) 1998, 1998, 01/21/1999, 01/13/2000   • Hib (PRP-OMP) 1998,  "1998, 01/21/1999, 01/13/2000   • Hpv9 11/09/2017   • IPV 1998, 1998, 01/13/2000, 07/12/2002   • Influenza LAIV (Nasal) 10/24/2008   • Influenza Quad Vaccine (Inpatient) 10/24/2001   • MMR 10/11/1999, 07/12/2002   • Meningococcal MCV4P (Menactra) 09/14/2009, 11/09/2017   • OPV 1998, 1998, 01/13/2000   • PPD Test 02/28/2017, 11/09/2017   • Pneumococcal Conjugate 13-Valent (PCV13) 11/22/2000   • Tdap 07/13/2009, 12/01/2017   • Varicella 07/15/1999, 01/13/2000, 10/24/2008, 02/07/2018       Objective   Vitals:    08/04/21 1331 08/04/21 1351   BP:  128/80   Pulse: 73    Resp: 16    Temp: 97.3 °F (36.3 °C)    TempSrc: Infrared    SpO2: 98%    Weight: 65.2 kg (143 lb 12.8 oz)    Height: 167.6 cm (65.98\")      Body mass index is 23.22 kg/m².  Physical Exam  Vitals reviewed.   Constitutional:       Appearance: Normal appearance.   HENT:      Head: Normocephalic and atraumatic.      Comments: No nystagmus present     Right Ear: Ear canal normal.      Left Ear: Ear canal normal.   Neurological:      Mental Status: She is alert.       Assessment/Plan   Diagnoses and all orders for this visit:    1. Benign paroxysmal positional vertigo, unspecified laterality (Primary)    2. Situational anxiety  -     Discontinue: busPIRone (BUSPAR) 5 MG tablet; Take 1 tablet by mouth 3 (Three) Times a Day.  Dispense: 90 tablet; Refill: 1  -     busPIRone (BUSPAR) 5 MG tablet; Take 1 tablet by mouth 3 (Three) Times a Day.  Dispense: 90 tablet; Refill: 1    Episode of BPPV a few days ago if sx return she will call me. Will try Buspar prn for situational anxiety.              "

## 2021-08-19 ENCOUNTER — OFFICE VISIT (OUTPATIENT)
Dept: INTERNAL MEDICINE | Facility: CLINIC | Age: 21
End: 2021-08-19

## 2021-08-19 VITALS — BODY MASS INDEX: 22.5 KG/M2 | HEIGHT: 66 IN | WEIGHT: 140 LBS | TEMPERATURE: 97.1 F

## 2021-08-19 DIAGNOSIS — K64.8 INTERNAL HEMORRHOIDS: Primary | ICD-10-CM

## 2021-08-19 PROCEDURE — 99213 OFFICE O/P EST LOW 20 MIN: CPT | Performed by: PHYSICIAN ASSISTANT

## 2021-08-19 RX ORDER — PROMETHAZINE HYDROCHLORIDE 25 MG/1
25 TABLET ORAL EVERY 6 HOURS PRN
COMMUNITY
Start: 2021-07-16 | End: 2021-09-29

## 2021-08-19 RX ORDER — HYDROCORTISONE ACETATE 25 MG/1
25 SUPPOSITORY RECTAL 2 TIMES DAILY
Qty: 24 SUPPOSITORY | Refills: 2 | Status: SHIPPED | OUTPATIENT
Start: 2021-08-19 | End: 2021-09-29

## 2021-08-19 NOTE — PROGRESS NOTES
Subjective   Chief Complaint   Patient presents with   • Rectal Bleeding     x2 weeks        History of Present Illness     Pt is here today with rectal bleeding x 2 weeks. She has blood in her stool and with wiping. She has no pain or constipation. Denies external hemorrhoids.      Patient Active Problem List   Diagnosis   • Migraine headache   • IUD (intrauterine device) in place       No Known Allergies    Current Outpatient Medications on File Prior to Visit   Medication Sig Dispense Refill   • busPIRone (BUSPAR) 5 MG tablet Take 1 tablet by mouth 3 (Three) Times a Day. 90 tablet 1   • Levonorgestrel (KYLEENA IU) by Intrauterine route.     • promethazine (PHENERGAN) 25 MG tablet Take 25 mg by mouth Every 6 (Six) Hours As Needed.     • topiramate (TOPAMAX) 25 MG capsule (sprinkle) Take 25 mg by mouth As Needed.       No current facility-administered medications on file prior to visit.       Past Medical History:   Diagnosis Date   • Anesthesia     BLOOD PRESSURE DROPPED POST-OP WITH EAR TUBES AGE 4   • History of Clostridium difficile colitis     NUMEROUS ANTIBIOTICS WITH STREP HX   • History of strep sore throat     7 TIMES IN 2020   • Migraines        Family History   Problem Relation Age of Onset   • Diabetes Mother    • Cancer Father         melanoma   • Hyperlipidemia Father    • Hypertension Father    • Epilepsy Father    • Migraines Paternal Aunt    • Diabetes Maternal Grandmother    • Cancer Paternal Grandfather         melanoma   • Malig Hyperthermia Neg Hx        Social History     Socioeconomic History   • Marital status: Single     Spouse name: Not on file   • Number of children: Not on file   • Years of education: Not on file   • Highest education level: Not on file   Tobacco Use   • Smoking status: Former Smoker   • Smokeless tobacco: Never Used   • Tobacco comment: vaping   Vaping Use   • Vaping Use: Former   Substance and Sexual Activity   • Alcohol use: Yes     Comment: OCC/SOCIALLY    • Drug  use: Never   • Sexual activity: Yes       Past Surgical History:   Procedure Laterality Date   • EAR TUBES     • TONSILLECTOMY Bilateral 7/13/2021    Procedure: TONSILLECTOMY;  Surgeon: Duc Alvarez MD;  Location: [facility] OR;  Service: ENT;  Laterality: Bilateral;   • WISDOM TOOTH EXTRACTION           The following portions of the patient's history were reviewed and updated as appropriate: problem list, allergies, current medications, past medical history, past family history, past social history and past surgical history.    Review of Systems   Gastrointestinal: Negative for change in bowel habit, constipation, diarrhea and melena.        BRB        Immunization History   Administered Date(s) Administered   • COVID-19 (PFIZER) 12/22/2020, 01/12/2021   • DTaP 1998, 1998, 01/21/1999, 01/13/2000, 07/02/2002   • Flu Vaccine Intradermal Quad 18-64YR 10/23/2007, 10/30/2015   • Flu Vaccine Quad PF >18YRS 10/24/2001   • Flu Vaccine Split Quad 02/28/2017, 10/06/2017, 09/20/2018, 09/24/2019   • Flulaval/Fluarix/Fluzone Quad 02/28/2017, 10/06/2017   • HPV Quadrivalent 07/13/2009, 09/14/2009, 01/15/2010   • Hep A / Hep B 01/02/2018, 02/07/2018, 07/12/2018   • Hep A, 2 Dose 10/24/2008, 07/13/2009, 07/31/2009   • Hep B, Adolescent or Pediatric 1998, 1998, 01/21/1999   • Hib (HbOC) 1998, 1998, 01/21/1999, 01/13/2000   • Hib (PRP-OMP) 1998, 1998, 01/21/1999, 01/13/2000   • Hpv9 11/09/2017   • IPV 1998, 1998, 01/13/2000, 07/12/2002   • Influenza LAIV (Nasal) 10/24/2008   • Influenza Quad Vaccine (Inpatient) 10/24/2001   • MMR 10/11/1999, 07/12/2002   • Meningococcal MCV4P (Menactra) 09/14/2009, 11/09/2017   • OPV 1998, 1998, 01/13/2000   • PPD Test 02/28/2017, 11/09/2017   • Pneumococcal Conjugate 13-Valent (PCV13) 11/22/2000   • Tdap 07/13/2009, 12/01/2017   • Varicella 07/15/1999, 01/13/2000, 10/24/2008, 02/07/2018       Objective   Vitals:     "08/19/21 0941   Temp: 97.1 °F (36.2 °C)   Weight: 63.5 kg (140 lb)   Height: 167.6 cm (66\")     Body mass index is 22.6 kg/m².  Physical Exam  Vitals reviewed.   Constitutional:       Appearance: Normal appearance.   HENT:      Head: Normocephalic and atraumatic.   Genitourinary:     Rectum: Guaiac result positive. Internal hemorrhoid (palpable internal hemorrhoid) present. No external hemorrhoid.   Neurological:      Mental Status: She is alert.         Assessment/Plan   Diagnoses and all orders for this visit:    1. Internal hemorrhoids (Primary)  -     hydrocortisone (ANUSOL-HC) 25 MG suppository; Insert 1 suppository into the rectum 2 (Two) Times a Day.  Dispense: 24 suppository; Refill: 2  -     Ambulatory Referral to Gastroenterology    Use anusol suppositories and see GI if not improving              "

## 2021-09-16 PROCEDURE — U0004 COV-19 TEST NON-CDC HGH THRU: HCPCS | Performed by: NURSE PRACTITIONER

## 2021-09-18 ENCOUNTER — TELEPHONE (OUTPATIENT)
Dept: URGENT CARE | Facility: CLINIC | Age: 21
End: 2021-09-18

## 2021-09-18 NOTE — TELEPHONE ENCOUNTER
Patient notified of results, continue quarantine, supportive care, follow up as needed or if symptoms worsen or persist.

## 2021-09-29 ENCOUNTER — HOSPITAL ENCOUNTER (OUTPATIENT)
Dept: GENERAL RADIOLOGY | Facility: HOSPITAL | Age: 21
Discharge: HOME | End: 2021-09-29
Admitting: PHYSICIAN ASSISTANT

## 2021-09-29 ENCOUNTER — OFFICE VISIT (OUTPATIENT)
Dept: INTERNAL MEDICINE | Facility: CLINIC | Age: 21
End: 2021-09-29

## 2021-09-29 VITALS
RESPIRATION RATE: 16 BRPM | WEIGHT: 141 LBS | TEMPERATURE: 97.8 F | DIASTOLIC BLOOD PRESSURE: 65 MMHG | BODY MASS INDEX: 22.66 KG/M2 | SYSTOLIC BLOOD PRESSURE: 121 MMHG | HEIGHT: 66 IN

## 2021-09-29 DIAGNOSIS — R19.7 DIARRHEA, UNSPECIFIED TYPE: Primary | ICD-10-CM

## 2021-09-29 DIAGNOSIS — R07.9 CHEST PAIN, UNSPECIFIED TYPE: ICD-10-CM

## 2021-09-29 PROCEDURE — 93000 ELECTROCARDIOGRAM COMPLETE: CPT | Performed by: PHYSICIAN ASSISTANT

## 2021-09-29 PROCEDURE — 71046 X-RAY EXAM CHEST 2 VIEWS: CPT

## 2021-09-29 PROCEDURE — 99214 OFFICE O/P EST MOD 30 MIN: CPT | Performed by: PHYSICIAN ASSISTANT

## 2021-09-29 NOTE — PROGRESS NOTES
Subjective   Chief Complaint   Patient presents with   • Abdominal Pain     intermittent- no n/v, no diarrhea   • Abdominal Cramping       History of Present Illness     Tested positive for COVID on 9/16/21. Had quite a bit of abdominal cramping while she had it and still persisting. Has no diarrhea or constipation symptoms. Had a very heavy period during acute COVID illness, has an IUD has not had a period in over a year. Has stopped having menstrual bleeding.     Still has some chest disocmofrt that is right sided, not throbbing or stabbing. Pain is not worse with inspiration. Feels some in her posterior right back as well.      Patient Active Problem List   Diagnosis   • Migraine headache   • IUD (intrauterine device) in place       No Known Allergies    Current Outpatient Medications on File Prior to Visit   Medication Sig Dispense Refill   • busPIRone (BUSPAR) 5 MG tablet Take 1 tablet by mouth 3 (Three) Times a Day. 90 tablet 1   • Levonorgestrel (KYLEENA IU) by Intrauterine route.     • topiramate (TOPAMAX) 25 MG capsule (sprinkle) Take 25 mg by mouth As Needed.     • [DISCONTINUED] hydrocortisone (ANUSOL-HC) 25 MG suppository Insert 1 suppository into the rectum 2 (Two) Times a Day. 24 suppository 2   • [DISCONTINUED] promethazine (PHENERGAN) 25 MG tablet Take 25 mg by mouth Every 6 (Six) Hours As Needed.       No current facility-administered medications on file prior to visit.       Past Medical History:   Diagnosis Date   • Anesthesia     BLOOD PRESSURE DROPPED POST-OP WITH EAR TUBES AGE 4   • History of Clostridium difficile colitis     NUMEROUS ANTIBIOTICS WITH STREP HX   • History of strep sore throat     7 TIMES IN 2020   • Migraines        Family History   Problem Relation Age of Onset   • Diabetes Mother    • Cancer Father         melanoma   • Hyperlipidemia Father    • Hypertension Father    • Epilepsy Father    • Migraines Paternal Aunt    • Diabetes Maternal Grandmother    • Cancer Paternal  Grandfather         melanoma   • Malig Hyperthermia Neg Hx        Social History     Socioeconomic History   • Marital status: Single     Spouse name: Not on file   • Number of children: Not on file   • Years of education: Not on file   • Highest education level: Not on file   Tobacco Use   • Smoking status: Former Smoker   • Smokeless tobacco: Never Used   • Tobacco comment: vaping   Vaping Use   • Vaping Use: Former   Substance and Sexual Activity   • Alcohol use: Yes     Comment: OCC/SOCIALLY    • Drug use: Never   • Sexual activity: Yes       Past Surgical History:   Procedure Laterality Date   • EAR TUBES     • TONSILLECTOMY Bilateral 7/13/2021    Procedure: TONSILLECTOMY;  Surgeon: Duc Alvarez MD;  Location: [facility];  Service: ENT;  Laterality: Bilateral;   • WISDOM TOOTH EXTRACTION           The following portions of the patient's history were reviewed and updated as appropriate: problem list, allergies, current medications, past medical history, past family history, past social history and past surgical history.    Review of Systems   Cardiovascular: Positive for chest pain. Negative for dyspnea on exertion and palpitations.   Respiratory: Negative for cough and shortness of breath.    Gastrointestinal: Positive for abdominal pain. Negative for constipation and diarrhea.       Immunization History   Administered Date(s) Administered   • COVID-19 (PFIZER) 12/22/2020, 01/12/2021   • DTaP 1998, 1998, 01/21/1999, 01/13/2000, 07/02/2002   • Flu Vaccine Intradermal Quad 18-64YR 10/23/2007, 10/30/2015   • Flu Vaccine Quad PF >18YRS 10/24/2001   • Flu Vaccine Split Quad 02/28/2017, 10/06/2017, 09/20/2018, 09/24/2019   • FluLaval/Fluarix (VFC) >6 Months 02/28/2017, 10/06/2017   • HPV Quadrivalent 07/13/2009, 09/14/2009, 01/15/2010   • Hep A / Hep B 01/02/2018, 02/07/2018, 07/12/2018   • Hep A, 2 Dose 10/24/2008, 07/13/2009, 07/31/2009   • Hep B, Adolescent or Pediatric 1998, 1998,  "01/21/1999   • Hib (HbOC) 1998, 1998, 01/21/1999, 01/13/2000   • Hib (PRP-OMP) 1998, 1998, 01/21/1999, 01/13/2000   • Hpv9 11/09/2017   • IPV 1998, 1998, 01/13/2000, 07/12/2002   • Influenza LAIV (Nasal) 10/24/2008   • Influenza Quad Vaccine (Inpatient) 10/24/2001   • MMR 10/11/1999, 07/12/2002   • Meningococcal MCV4P (Menactra) 09/14/2009, 11/09/2017   • OPV 1998, 1998, 01/13/2000   • PPD Test 02/28/2017, 11/09/2017   • Pneumococcal Conjugate 13-Valent (PCV13) 11/22/2000   • Tdap 07/13/2009, 12/01/2017   • Varicella 07/15/1999, 01/13/2000, 10/24/2008, 02/07/2018       Objective   Vitals:    09/29/21 1356 09/29/21 1411   BP:  121/65   Resp: 16    Temp: 97.8 °F (36.6 °C)    TempSrc: Infrared    Weight: 64 kg (141 lb)    Height: 167.6 cm (65.98\")      Body mass index is 22.77 kg/m².    Physical Exam  Vitals reviewed.   Constitutional:       Appearance: Normal appearance.   HENT:      Head: Normocephalic and atraumatic.   Cardiovascular:      Rate and Rhythm: Normal rate and regular rhythm.      Heart sounds: Normal heart sounds.   Pulmonary:      Effort: Pulmonary effort is normal.      Breath sounds: Normal breath sounds.   Abdominal:      General: Abdomen is flat. Bowel sounds are normal.      Palpations: Abdomen is soft.   Neurological:      Mental Status: She is alert.         ECG 12 Lead    Date/Time: 9/29/2021 2:32 PM  Performed by: Ruby Rangel PA-C  Authorized by: Ruby Rangel PA-C   Comparison: not compared with previous ECG   Rhythm: sinus rhythm  Rate: normal  Conduction: conduction normal  ST Segments: ST segments normal  T Waves: T waves normal  QRS axis: normal    Clinical impression: normal ECG          Assessment/Plan   Diagnoses and all orders for this visit:    1. Diarrhea, unspecified type (Primary)  -     Clostridium Difficile Toxin, PCR - Stool, Per Rectum    2. Chest pain, unspecified type  -     XR Chest 2 View  -     D-dimer, " Quantitative    Other orders  -     ECG 12 Lead    EKG today is normal showing sinus rhythm. Had c diff earlier this year, having similar cramping pain as previously will re test today for C diff recurrence after her COVID. We discussed though rare, has happened with some patients. Continues to have chest discomfort post COVID check d dimer today and CXR.

## 2021-09-30 LAB — D DIMER PPP FEU-MCNC: <0.2 MG/L FEU (ref 0–0.49)

## 2021-10-05 LAB — C DIFF TOX GENS STL QL NAA+PROBE: NEGATIVE

## 2021-12-23 ENCOUNTER — OFFICE VISIT (OUTPATIENT)
Dept: GASTROENTEROLOGY | Facility: CLINIC | Age: 21
End: 2021-12-23

## 2021-12-23 ENCOUNTER — TELEPHONE (OUTPATIENT)
Dept: GASTROENTEROLOGY | Facility: CLINIC | Age: 21
End: 2021-12-23

## 2021-12-23 VITALS — BODY MASS INDEX: 22.73 KG/M2 | HEIGHT: 66 IN | WEIGHT: 141.4 LBS | TEMPERATURE: 96.9 F

## 2021-12-23 DIAGNOSIS — K62.5 RECTAL BLEEDING: Primary | ICD-10-CM

## 2021-12-23 PROCEDURE — 99203 OFFICE O/P NEW LOW 30 MIN: CPT | Performed by: INTERNAL MEDICINE

## 2021-12-23 NOTE — TELEPHONE ENCOUNTER
mildred Lebron for 2/11 arrive at 145-flex sig/patient procedure packet was given to pt in office on 12/23 pt was advised time of arrival is subject to change, [facility] will call for w/finale time

## 2021-12-23 NOTE — PROGRESS NOTES
Chief Complaint   Patient presents with   • Rectal Bleeding   • history of c-diff   • Abdominal Pain   • Heartburn     Tran Thompson is a 21 y.o. female who presents with a history of rectal bleeding and crampy abdominal pain  HPI     Patient 21-year-old female with significant past medical history of recurrent strep throat infections last year requiring tonsillectomy.  Patient had multiple, does not, episodes of infection requiring antibiotic courses when she developed C. difficile colitis in April.  Since that event patient has been suffering with lower abdominal pain and intermittent rectal bleeding with recent onset mucus in the stool.  Patient was treated for the C. difficile last April and her diarrhea did improve with treatment but unfortunately the crampy pain and rectal bleeding has continued.  Patient reports no further diarrhea or fever seen though.  Patient with recent increase in mucus production in her stool.    Past Medical History:   Diagnosis Date   • Anesthesia     BLOOD PRESSURE DROPPED POST-OP WITH EAR TUBES AGE 4   • History of Clostridium difficile colitis     NUMEROUS ANTIBIOTICS WITH STREP HX   • History of strep sore throat     7 TIMES IN 2020   • Migraines        Current Outpatient Medications:   •  busPIRone (BUSPAR) 5 MG tablet, Take 1 tablet by mouth 3 (Three) Times a Day., Disp: 90 tablet, Rfl: 1  •  ibuprofen (ADVIL,MOTRIN) 800 MG tablet, Take 1 tablet by mouth Every 8 (Eight) Hours As Needed., Disp: 30 tablet, Rfl: 0  •  Levonorgestrel (KYLEENA IU), by Intrauterine route., Disp: , Rfl:   •  topiramate (TOPAMAX) 25 MG capsule (sprinkle), Take 25 mg by mouth As Needed., Disp: , Rfl:   No Known Allergies  Social History     Socioeconomic History   • Marital status: Single   Tobacco Use   • Smoking status: Former Smoker   • Smokeless tobacco: Never Used   • Tobacco comment: vaping   Vaping Use   • Vaping Use: Former   Substance and Sexual Activity   • Alcohol use: Yes     Comment:  OCC/SOCIALLY    • Drug use: Never   • Sexual activity: Yes     Family History   Problem Relation Age of Onset   • Diabetes Mother    • Cancer Father         melanoma   • Hyperlipidemia Father    • Hypertension Father    • Epilepsy Father    • Migraines Paternal Aunt    • Diabetes Maternal Grandmother    • Cancer Paternal Grandfather         melanoma   • Malig Hyperthermia Neg Hx      Review of Systems   Constitutional: Negative.    HENT: Negative.    Eyes: Negative.    Respiratory: Negative.    Cardiovascular: Negative.    Gastrointestinal: Positive for abdominal pain and blood in stool. Negative for abdominal distention, constipation, diarrhea, nausea, rectal pain and vomiting.   Endocrine: Negative.    Musculoskeletal: Negative.    Skin: Negative.    Allergic/Immunologic: Negative.    Hematological: Negative.      Vitals:    12/23/21 1501   Temp: 96.9 °F (36.1 °C)     Physical Exam  Vitals and nursing note reviewed.   Constitutional:       Appearance: Normal appearance. She is well-developed and normal weight.   HENT:      Head: Normocephalic and atraumatic.   Eyes:      General: No scleral icterus.     Pupils: Pupils are equal, round, and reactive to light.   Cardiovascular:      Rate and Rhythm: Normal rate and regular rhythm.      Heart sounds: Normal heart sounds. No murmur heard.  No friction rub. No gallop.    Pulmonary:      Effort: Pulmonary effort is normal.      Breath sounds: Normal breath sounds. No wheezing or rales.   Abdominal:      General: Bowel sounds are normal. There is no distension or abdominal bruit.      Palpations: Abdomen is soft. Abdomen is not rigid. There is no shifting dullness, fluid wave, mass or pulsatile mass.      Tenderness: There is no abdominal tenderness. There is no guarding.      Hernia: No hernia is present.   Musculoskeletal:         General: No swelling or tenderness. Normal range of motion.   Skin:     General: Skin is warm and dry.      Coloration: Skin is not  jaundiced.      Findings: No rash.   Neurological:      General: No focal deficit present.      Mental Status: She is alert and oriented to person, place, and time.      Cranial Nerves: No cranial nerve deficit.   Psychiatric:         Behavior: Behavior normal.         Thought Content: Thought content normal.       Diagnoses and all orders for this visit:    Rectal bleeding  -     Case Request; Standing  -     Case Request    Patient 21-year-old female with past medical history significant for recurrent strep throat requiring tonsillectomy and multiple courses of IV antibiotics developed C. difficile colitis in April.  Patient reports acute attack began developed severe crampy abdominal pain with large volume diarrhea and rectal bleeding.  Patient treated to good effect but patient reports since that time has been having recurrent attacks of lower abdominal pain and cramps with occasional rectal bleeding and mucus in the stool.  No significant diarrhea has recurred since initial treatment but symptoms continue.  Repeat testing for C. difficile has been negative.  At this point the differential includes postinfectious microscopic colitis versus possible IBS.  Would recommend proceeding with flexible sigmoidoscopy for left colon biopsies to assess for further recommendations.  We will follow-up clinically based on findings.

## 2022-01-10 ENCOUNTER — TELEPHONE (OUTPATIENT)
Dept: GASTROENTEROLOGY | Facility: CLINIC | Age: 22
End: 2022-01-10

## 2022-01-10 NOTE — TELEPHONE ENCOUNTER
mildred Lebron for 03/11 arrive at 800-flex sig/patient procedure packet was given to pt in office on 12/23 pt was advised time of arrival is subject to change, [facility] will call for w/finale time

## 2022-01-23 PROCEDURE — U0004 COV-19 TEST NON-CDC HGH THRU: HCPCS | Performed by: NURSE PRACTITIONER

## 2022-03-10 RX ORDER — SPIRONOLACTONE 100 MG/1
100 TABLET, FILM COATED ORAL NIGHTLY
COMMUNITY

## 2022-03-11 ENCOUNTER — ANESTHESIA EVENT (OUTPATIENT)
Dept: GASTROENTEROLOGY | Facility: HOSPITAL | Age: 22
End: 2022-03-11

## 2022-03-11 ENCOUNTER — ANESTHESIA (OUTPATIENT)
Dept: GASTROENTEROLOGY | Facility: HOSPITAL | Age: 22
End: 2022-03-11

## 2022-03-11 ENCOUNTER — HOSPITAL ENCOUNTER (OUTPATIENT)
Facility: HOSPITAL | Age: 22
Setting detail: HOSPITAL OUTPATIENT SURGERY
Discharge: HOME | End: 2022-03-11
Attending: INTERNAL MEDICINE | Admitting: INTERNAL MEDICINE

## 2022-03-11 VITALS
SYSTOLIC BLOOD PRESSURE: 106 MMHG | WEIGHT: 141.4 LBS | OXYGEN SATURATION: 100 % | DIASTOLIC BLOOD PRESSURE: 76 MMHG | BODY MASS INDEX: 22.73 KG/M2 | RESPIRATION RATE: 16 BRPM | HEART RATE: 71 BPM | HEIGHT: 66 IN

## 2022-03-11 DIAGNOSIS — K62.5 RECTAL BLEEDING: ICD-10-CM

## 2022-03-11 LAB
B-HCG UR QL: NEGATIVE
EXPIRATION DATE: NORMAL
INTERNAL NEGATIVE CONTROL: NORMAL
INTERNAL POSITIVE CONTROL: NORMAL
Lab: NORMAL

## 2022-03-11 PROCEDURE — 81025 URINE PREGNANCY TEST: CPT | Performed by: INTERNAL MEDICINE

## 2022-03-11 PROCEDURE — 25010000002 PROPOFOL 10 MG/ML EMULSION: Performed by: ANESTHESIOLOGY

## 2022-03-11 PROCEDURE — 88305 TISSUE EXAM BY PATHOLOGIST: CPT | Performed by: INTERNAL MEDICINE

## 2022-03-11 PROCEDURE — 45330 DIAGNOSTIC SIGMOIDOSCOPY: CPT | Performed by: INTERNAL MEDICINE

## 2022-03-11 RX ORDER — SODIUM CHLORIDE, SODIUM LACTATE, POTASSIUM CHLORIDE, CALCIUM CHLORIDE 600; 310; 30; 20 MG/100ML; MG/100ML; MG/100ML; MG/100ML
1000 INJECTION, SOLUTION INTRAVENOUS CONTINUOUS
Status: DISCONTINUED | OUTPATIENT
Start: 2022-03-11 | End: 2022-03-11 | Stop reason: HOSPADM

## 2022-03-11 RX ORDER — PROPOFOL 10 MG/ML
VIAL (ML) INTRAVENOUS CONTINUOUS PRN
Status: DISCONTINUED | OUTPATIENT
Start: 2022-03-11 | End: 2022-03-11 | Stop reason: SURG

## 2022-03-11 RX ORDER — SODIUM CHLORIDE, SODIUM LACTATE, POTASSIUM CHLORIDE, CALCIUM CHLORIDE 600; 310; 30; 20 MG/100ML; MG/100ML; MG/100ML; MG/100ML
INJECTION, SOLUTION INTRAVENOUS CONTINUOUS PRN
Status: DISCONTINUED | OUTPATIENT
Start: 2022-03-11 | End: 2022-03-11 | Stop reason: SURG

## 2022-03-11 RX ORDER — LIDOCAINE HYDROCHLORIDE 20 MG/ML
INJECTION, SOLUTION INFILTRATION; PERINEURAL AS NEEDED
Status: DISCONTINUED | OUTPATIENT
Start: 2022-03-11 | End: 2022-03-11 | Stop reason: SURG

## 2022-03-11 RX ORDER — SODIUM CHLORIDE 0.9 % (FLUSH) 0.9 %
10 SYRINGE (ML) INJECTION AS NEEDED
Status: DISCONTINUED | OUTPATIENT
Start: 2022-03-11 | End: 2022-03-11 | Stop reason: HOSPADM

## 2022-03-11 RX ORDER — PROPOFOL 10 MG/ML
VIAL (ML) INTRAVENOUS AS NEEDED
Status: DISCONTINUED | OUTPATIENT
Start: 2022-03-11 | End: 2022-03-11 | Stop reason: SURG

## 2022-03-11 RX ADMIN — SODIUM CHLORIDE, POTASSIUM CHLORIDE, SODIUM LACTATE AND CALCIUM CHLORIDE: 600; 310; 30; 20 INJECTION, SOLUTION INTRAVENOUS at 09:16

## 2022-03-11 RX ADMIN — SODIUM CHLORIDE, POTASSIUM CHLORIDE, SODIUM LACTATE AND CALCIUM CHLORIDE 1000 ML: 600; 310; 30; 20 INJECTION, SOLUTION INTRAVENOUS at 08:46

## 2022-03-11 RX ADMIN — LIDOCAINE HYDROCHLORIDE 60 MG: 20 INJECTION, SOLUTION INFILTRATION; PERINEURAL at 09:19

## 2022-03-11 RX ADMIN — PROPOFOL 200 MG: 10 INJECTION, EMULSION INTRAVENOUS at 09:21

## 2022-03-11 RX ADMIN — Medication 250 MCG/KG/MIN: at 09:22

## 2022-03-11 RX ADMIN — PROPOFOL 50 MG: 10 INJECTION, EMULSION INTRAVENOUS at 09:24

## 2022-03-11 NOTE — H&P
[facility]  Pre Procedure History & Physical    Chief Complaint:   Rectal bleeding and diarrhea    Subjective     HPI:   Patient 21-year-old female with history of C. difficile presenting with ongoing diarrhea and rectal bleeding.  Patient here for flexible sigmoidoscopy for evaluation.    Past Medical History:   Past Medical History:   Diagnosis Date   • Anesthesia     BLOOD PRESSURE DROPPED POST-OP WITH EAR TUBES AGE 4   • History of Clostridium difficile colitis     NUMEROUS ANTIBIOTICS WITH STREP HX   • History of strep sore throat     7 TIMES IN 2020   • Migraines    • Rectal bleeding        Past Surgical History:  Past Surgical History:   Procedure Laterality Date   • EAR TUBES     • TONSILLECTOMY Bilateral 7/13/2021    Procedure: TONSILLECTOMY;  Surgeon: Duc Alvarez MD;  Location: [facility];  Service: ENT;  Laterality: Bilateral;   • WISDOM TOOTH EXTRACTION         Family History:  Family History   Problem Relation Age of Onset   • Diabetes Mother    • Cancer Father         melanoma   • Hyperlipidemia Father    • Hypertension Father    • Epilepsy Father    • Migraines Paternal Aunt    • Diabetes Maternal Grandmother    • Cancer Paternal Grandfather         melanoma   • Malig Hyperthermia Neg Hx        Social History:   reports that she has never smoked. She has never used smokeless tobacco. She reports current alcohol use. She reports that she does not use drugs.    Medications:   Medications Prior to Admission   Medication Sig Dispense Refill Last Dose   • clindamycin (CLEOCIN T) 1 % lotion Apply  topically to the appropriate area as directed 2 (Two) Times a Day.   3/10/2022 at Unknown time   • ibuprofen (ADVIL,MOTRIN) 800 MG tablet Take 1 tablet by mouth Every 8 (Eight) Hours As Needed. (Patient taking differently: Take 800 mg by mouth Every 8 (Eight) Hours As Needed. Stopped one week ago) 30 tablet 0 Past Week at Unknown time   • Levonorgestrel (KYLEENA IU) by  "Intrauterine route.      • spironolactone (ALDACTONE) 100 MG tablet Take 100 mg by mouth Every Night. For acne   3/10/2022 at Unknown time   • tretinoin (RETIN-A) 0.025 % cream APPLY A SMALL AMOUNT TO THE AFFECTED AREA OF FACE EVERY NIGHT AT BEDTIME   Past Week at Unknown time   • busPIRone (BUSPAR) 5 MG tablet Take 1 tablet by mouth 3 (Three) Times a Day. 90 tablet 1 More than a month at Unknown time       Allergies:  Patient has no known allergies.    ROS:    Pertinent items are noted in HPI     Objective     Blood pressure 123/98, pulse 100, resp. rate 16, height 167.6 cm (66\"), weight 64.1 kg (141 lb 6.4 oz), SpO2 98 %, not currently breastfeeding.    Physical Exam   Constitutional: Pt is oriented to person, place, and time and well-developed, well-nourished, and in no distress.   Mouth/Throat: Oropharynx is clear and moist.   Neck: Normal range of motion.   Cardiovascular: Normal rate, regular rhythm and normal heart sounds.    Pulmonary/Chest: Effort normal and breath sounds normal.   Abdominal: Soft. Nontender  Skin: Skin is warm and dry.   Psychiatric: Mood, memory, affect and judgment normal.     Assessment/Plan     Diagnosis:  Rectal bleeding  Chronic diarrhea    Anticipated Surgical Procedure:  Flexible sigmoidoscopy    The risks, benefits, and alternatives of this procedure have been discussed with the patient or the responsible party- the patient understands and agrees to proceed.                                                          "

## 2022-03-11 NOTE — DISCHARGE INSTRUCTIONS
For the next 24 hours patient needs to be with a responsible adult.    For 24 hours DO NOT drive, operate machinery, appliances, drink alcohol, make important decisions or sign legal documents.    Start with a light or bland diet if you are feeling sick to your stomach otherwise advance to regular diet as tolerated.    Follow recommendations on procedure report if provided by your doctor.    Call Dr Ayoub for problems [phone]    Problems may include but not limited to: large amounts of bleeding, trouble breathing, repeated vomiting, severe unrelieved pain, fever or chills.

## 2022-03-11 NOTE — BRIEF OP NOTE
FLEXIBLE SIGMOIDOSCOPY WITH BIOPSY  Progress Note    Tran Quintanilla  3/11/2022    Pre-op Diagnosis:   Rectal bleeding [K62.5]       Post-Op Diagnosis Codes:     * Rectal bleeding [K62.5]     * Internal hemorrhoids [K64.8]    Procedure/CPT® Codes:        Procedure(s):  FLEXIBLE SIGMOIDOSCOPY WITH BIOPSIES    Surgeon(s):  Baron Ayoub MD    Anesthesia: Monitored Anesthesia Care    Staff:   Endo Technician: Harinder Chauhan PCT  Endo Nurse: Nanette Mcnally RN         Estimated Blood Loss: none    Urine Voided: * No values recorded between 3/11/2022  9:17 AM and 3/11/2022  9:34 AM *    Specimens:                Specimens     ID Source Type Tests Collected By Collected At Frozen?    A Large Intestine, Left / Descending Colon Tissue · TISSUE PATHOLOGY EXAM   Baron Ayoub MD 3/11/22 0211     B Large Intestine, Rectum Tissue · TISSUE PATHOLOGY EXAM   Baron Ayoub MD 3/11/22 0930                 Drains: * No LDAs found *    Findings: Flexible sigmoidoscopy to the transverse colon with normal colonic mucosa throughout.  Internal hemorrhoids were identified.  Biopsies of the descending colon and rectum were obtained to rule out microscopic colitis.  Patient tolerated well sent to recovery.    Complications: None          Baron Ayoub MD     Date: 3/11/2022  Time: 09:35 EST

## 2022-03-11 NOTE — ANESTHESIA POSTPROCEDURE EVALUATION
"Patient: Tran Quintanilla    Procedure Summary     Date: 03/11/22 Room / Location:  [facility]    Anesthesia Start: 0916 Anesthesia Stop: 0939    Procedure: FLEXIBLE SIGMOIDOSCOPY WITH BIOPSIES (N/A ) Diagnosis:       Rectal bleeding      Internal hemorrhoids      (Rectal bleeding [K62.5])    Surgeons: Baron Ayoub MD Provider: Sammi Cardona MD    Anesthesia Type: MAC ASA Status: 1          Anesthesia Type: MAC    Vitals  No vitals data found for the desired time range.          Post Anesthesia Care and Evaluation    Patient location during evaluation: bedside  Patient participation: complete - patient participated  Level of consciousness: awake  Pain score: 0  Pain management: adequate  Airway patency: patent  Anesthetic complications: No anesthetic complications  PONV Status: none  Cardiovascular status: acceptable  Respiratory status: acceptable  Hydration status: acceptable    Comments: /98 (BP Location: Left arm, Patient Position: Lying)   Pulse 100   Resp 16   Ht 167.6 cm (66\")   Wt 64.1 kg (141 lb 6.4 oz)   SpO2 98%   BMI 22.82 kg/m²       "

## 2022-03-11 NOTE — ANESTHESIA PREPROCEDURE EVALUATION
Anesthesia Evaluation     Patient summary reviewed and Nursing notes reviewed   NPO Solid Status: > 8 hours  NPO Liquid Status: > 2 hours           Airway   Mallampati: I  Dental - normal exam     Pulmonary - negative pulmonary ROS and normal exam   Cardiovascular - negative cardio ROS and normal exam        Neuro/Psych  (+) headaches,    GI/Hepatic/Renal/Endo - negative ROS     Musculoskeletal (-) negative ROS    Abdominal    Substance History - negative use     OB/GYN          Other - negative ROS                         Anesthesia Plan    ASA 1     MAC       Anesthetic plan, all risks, benefits, and alternatives have been provided, discussed and informed consent has been obtained with: patient.

## 2022-03-14 LAB
LAB AP CASE REPORT: NORMAL
PATH REPORT.FINAL DX SPEC: NORMAL
PATH REPORT.GROSS SPEC: NORMAL

## 2022-03-30 ENCOUNTER — TELEPHONE (OUTPATIENT)
Dept: GASTROENTEROLOGY | Facility: CLINIC | Age: 22
End: 2022-03-30

## 2022-03-30 NOTE — TELEPHONE ENCOUNTER
----- Message from Baron Ayoub MD sent at 3/27/2022  4:17 PM EDT -----  Biopsies normal, treat hemorrhoids and call for recurrent bleeding

## 2022-05-20 ENCOUNTER — OFFICE VISIT (OUTPATIENT)
Dept: INTERNAL MEDICINE | Facility: CLINIC | Age: 22
End: 2022-05-20

## 2022-05-20 VITALS — WEIGHT: 143 LBS | TEMPERATURE: 97.7 F | BODY MASS INDEX: 23.82 KG/M2 | HEIGHT: 65 IN

## 2022-05-20 DIAGNOSIS — R10.9 ABDOMINAL PAIN, UNSPECIFIED ABDOMINAL LOCATION: Primary | ICD-10-CM

## 2022-05-20 DIAGNOSIS — K62.5 RECTAL BLEEDING: ICD-10-CM

## 2022-05-20 PROCEDURE — 99214 OFFICE O/P EST MOD 30 MIN: CPT | Performed by: PHYSICIAN ASSISTANT

## 2022-05-20 RX ORDER — DICYCLOMINE HYDROCHLORIDE 10 MG/1
10 CAPSULE ORAL
Qty: 90 CAPSULE | Refills: 0 | Status: SHIPPED | OUTPATIENT
Start: 2022-05-20

## 2022-05-24 DIAGNOSIS — R10.9 ABDOMINAL PAIN, UNSPECIFIED ABDOMINAL LOCATION: Primary | ICD-10-CM

## 2022-05-24 LAB
ENDOMYSIUM IGA SER QL: NEGATIVE
IGA SERPL-MCNC: 178 MG/DL (ref 87–352)
TTG IGA SER-ACNC: <2 U/ML (ref 0–3)

## 2022-07-11 ENCOUNTER — PREP FOR SURGERY (OUTPATIENT)
Dept: SURGERY | Facility: SURGERY CENTER | Age: 22
End: 2022-07-11

## 2022-07-11 ENCOUNTER — OFFICE VISIT (OUTPATIENT)
Dept: GASTROENTEROLOGY | Facility: CLINIC | Age: 22
End: 2022-07-11

## 2022-07-11 VITALS
OXYGEN SATURATION: 100 % | DIASTOLIC BLOOD PRESSURE: 76 MMHG | BODY MASS INDEX: 23.74 KG/M2 | SYSTOLIC BLOOD PRESSURE: 112 MMHG | TEMPERATURE: 97.4 F | HEART RATE: 79 BPM | HEIGHT: 65 IN | WEIGHT: 142.5 LBS

## 2022-07-11 DIAGNOSIS — K62.5 RECTAL BLEEDING: Primary | ICD-10-CM

## 2022-07-11 DIAGNOSIS — K58.0 IRRITABLE BOWEL SYNDROME WITH DIARRHEA: ICD-10-CM

## 2022-07-11 DIAGNOSIS — R19.7 DIARRHEA, UNSPECIFIED TYPE: ICD-10-CM

## 2022-07-11 DIAGNOSIS — K62.5 RECTAL BLEEDING: ICD-10-CM

## 2022-07-11 DIAGNOSIS — Z86.19 H/O CLOSTRIDIUM DIFFICILE INFECTION: ICD-10-CM

## 2022-07-11 DIAGNOSIS — R12 HEARTBURN: ICD-10-CM

## 2022-07-11 DIAGNOSIS — R10.30 LOWER ABDOMINAL PAIN: Primary | ICD-10-CM

## 2022-07-11 DIAGNOSIS — R10.9 ABDOMINAL PAIN, UNSPECIFIED ABDOMINAL LOCATION: ICD-10-CM

## 2022-07-11 DIAGNOSIS — K58.9 IRRITABLE BOWEL SYNDROME, UNSPECIFIED TYPE: ICD-10-CM

## 2022-07-11 PROCEDURE — 99214 OFFICE O/P EST MOD 30 MIN: CPT | Performed by: INTERNAL MEDICINE

## 2022-07-11 RX ORDER — SODIUM CHLORIDE, SODIUM LACTATE, POTASSIUM CHLORIDE, CALCIUM CHLORIDE 600; 310; 30; 20 MG/100ML; MG/100ML; MG/100ML; MG/100ML
30 INJECTION, SOLUTION INTRAVENOUS CONTINUOUS PRN
Status: CANCELLED | OUTPATIENT
Start: 2022-09-01

## 2022-07-11 RX ORDER — TRETINOIN 0.5 MG/G
CREAM TOPICAL
COMMUNITY
Start: 2022-05-19 | End: 2022-08-31

## 2022-07-11 RX ORDER — SODIUM CHLORIDE 0.9 % (FLUSH) 0.9 %
3 SYRINGE (ML) INJECTION EVERY 12 HOURS SCHEDULED
Status: CANCELLED | OUTPATIENT
Start: 2022-09-01

## 2022-07-11 RX ORDER — SODIUM CHLORIDE 0.9 % (FLUSH) 0.9 %
10 SYRINGE (ML) INJECTION AS NEEDED
Status: CANCELLED | OUTPATIENT
Start: 2022-09-01

## 2022-07-11 RX ORDER — SODIUM CHLORIDE 0.9 % (FLUSH) 0.9 %
10 SYRINGE (ML) INJECTION AS NEEDED
Status: CANCELLED | OUTPATIENT
Start: 2023-01-10

## 2022-07-11 RX ORDER — TRETINOIN 0.5 MG/G
CREAM TOPICAL
COMMUNITY
Start: 2022-05-17 | End: 2022-08-31

## 2022-07-11 RX ORDER — SODIUM CHLORIDE 0.9 % (FLUSH) 0.9 %
3 SYRINGE (ML) INJECTION EVERY 12 HOURS SCHEDULED
Status: CANCELLED | OUTPATIENT
Start: 2023-01-10

## 2022-07-11 RX ORDER — SODIUM CHLORIDE, SODIUM LACTATE, POTASSIUM CHLORIDE, CALCIUM CHLORIDE 600; 310; 30; 20 MG/100ML; MG/100ML; MG/100ML; MG/100ML
30 INJECTION, SOLUTION INTRAVENOUS CONTINUOUS PRN
Status: CANCELLED | OUTPATIENT
Start: 2023-01-10

## 2022-07-11 NOTE — PROGRESS NOTES
"Chief Complaint   Patient presents with   • Abdominal Pain           History of Present Illness  Patient is here for evaluation.  Previous patient of Dr. Baron Ayoub.  She presented with a history of rectal bleeding and cramping abdominal pain.  She had developed C. difficile in April 2021.  She was treated with antibiotics. She had strep 8 times in 2020.     Since she had C-diff She has had blood in her stool, mucous, abdominal pain that comes and goes. Pain was severe 2 months ago. She can have cramping that comes and goes and more severe pains at times.     She can also have heartburn with vomiting in her mouth at times. This is also new since she had Cdiff.     She did not have any of these symptoms prior to C-diff.     She has been prescribed dicyclomine by her PCP but she did not start this as she recently also started new Rx with her derm.     She had scope with Dr Ayoub for these symptoms.       Flex sig 3-11-22 to transverse colon , internal hemTissue Pathology Exam (03/11/2022 09:30)  Neg for microscopic colitis  Celiac Disease Panel (05/20/2022 15:47)  negative    Review of Systems     Result Review :           Vital Signs:   /76   Pulse 79   Temp 97.4 °F (36.3 °C)   Ht 165.1 cm (65\")   Wt 64.6 kg (142 lb 8 oz)   SpO2 100%   BMI 23.71 kg/m²     Body mass index is 23.71 kg/m².     Physical Exam      Assessment and Plan    Diagnoses and all orders for this visit:    1. Lower abdominal pain (Primary)    2. Irritable bowel syndrome, unspecified type    3. Rectal bleeding    4. H/O Clostridium difficile infection    5. Irritable bowel syndrome with diarrhea         Change in symptoms after C diff and multiple rounds of antibiotics.   She had flexible sigmoidoscopy but not full colonoscopy.   Offered full colonoscopy for complete evaluation.   Orders placed for EGD and colonoscopy    Discussed causes of symptoms including SBBO and post infectious Also  IBS after C diff infection.    also " discussed treatment options including dicyclomine and xifaxan.   If symptoms are better, we can cancel scopes.     Rx sent pharmacy for xifaxan.     I have reviewed and confirmed the accuracy of the HPI and Assessment and Plan as documented by the APRN BETZY Duggan

## 2022-08-30 ENCOUNTER — APPOINTMENT (OUTPATIENT)
Dept: LAB | Facility: HOSPITAL | Age: 22
End: 2022-08-30

## 2022-09-01 ENCOUNTER — ANESTHESIA (OUTPATIENT)
Dept: GASTROENTEROLOGY | Facility: HOSPITAL | Age: 22
End: 2022-09-01

## 2022-09-01 ENCOUNTER — ANESTHESIA EVENT (OUTPATIENT)
Dept: GASTROENTEROLOGY | Facility: HOSPITAL | Age: 22
End: 2022-09-01

## 2022-09-01 ENCOUNTER — HOSPITAL ENCOUNTER (OUTPATIENT)
Facility: HOSPITAL | Age: 22
Setting detail: HOSPITAL OUTPATIENT SURGERY
Discharge: HOME | End: 2022-09-01
Attending: INTERNAL MEDICINE | Admitting: INTERNAL MEDICINE

## 2022-09-01 VITALS
BODY MASS INDEX: 22.35 KG/M2 | DIASTOLIC BLOOD PRESSURE: 70 MMHG | SYSTOLIC BLOOD PRESSURE: 103 MMHG | RESPIRATION RATE: 16 BRPM | HEART RATE: 81 BPM | WEIGHT: 139.1 LBS | HEIGHT: 66 IN | OXYGEN SATURATION: 98 % | TEMPERATURE: 97.5 F

## 2022-09-01 DIAGNOSIS — R10.9 ABDOMINAL PAIN, UNSPECIFIED ABDOMINAL LOCATION: ICD-10-CM

## 2022-09-01 DIAGNOSIS — R12 HEARTBURN: ICD-10-CM

## 2022-09-01 DIAGNOSIS — R19.7 DIARRHEA, UNSPECIFIED TYPE: ICD-10-CM

## 2022-09-01 DIAGNOSIS — K62.5 RECTAL BLEEDING: ICD-10-CM

## 2022-09-01 DIAGNOSIS — Z86.19 H/O CLOSTRIDIUM DIFFICILE INFECTION: ICD-10-CM

## 2022-09-01 LAB
B-HCG UR QL: NEGATIVE
EXPIRATION DATE: NORMAL
INTERNAL NEGATIVE CONTROL: NEGATIVE
INTERNAL POSITIVE CONTROL: POSITIVE
Lab: NORMAL

## 2022-09-01 PROCEDURE — 87081 CULTURE SCREEN ONLY: CPT | Performed by: INTERNAL MEDICINE

## 2022-09-01 PROCEDURE — 81025 URINE PREGNANCY TEST: CPT | Performed by: NURSE PRACTITIONER

## 2022-09-01 PROCEDURE — 25010000002 PROPOFOL 10 MG/ML EMULSION: Performed by: NURSE ANESTHETIST, CERTIFIED REGISTERED

## 2022-09-01 PROCEDURE — 43239 EGD BIOPSY SINGLE/MULTIPLE: CPT | Performed by: INTERNAL MEDICINE

## 2022-09-01 PROCEDURE — 45380 COLONOSCOPY AND BIOPSY: CPT | Performed by: INTERNAL MEDICINE

## 2022-09-01 PROCEDURE — 88305 TISSUE EXAM BY PATHOLOGIST: CPT | Performed by: INTERNAL MEDICINE

## 2022-09-01 RX ORDER — SODIUM CHLORIDE 0.9 % (FLUSH) 0.9 %
10 SYRINGE (ML) INJECTION AS NEEDED
Status: DISCONTINUED | OUTPATIENT
Start: 2022-09-01 | End: 2022-09-01 | Stop reason: HOSPADM

## 2022-09-01 RX ORDER — PROPOFOL 10 MG/ML
VIAL (ML) INTRAVENOUS AS NEEDED
Status: DISCONTINUED | OUTPATIENT
Start: 2022-09-01 | End: 2022-09-01 | Stop reason: SURG

## 2022-09-01 RX ORDER — SODIUM CHLORIDE, SODIUM LACTATE, POTASSIUM CHLORIDE, CALCIUM CHLORIDE 600; 310; 30; 20 MG/100ML; MG/100ML; MG/100ML; MG/100ML
30 INJECTION, SOLUTION INTRAVENOUS CONTINUOUS PRN
Status: DISCONTINUED | OUTPATIENT
Start: 2022-09-01 | End: 2022-09-01 | Stop reason: HOSPADM

## 2022-09-01 RX ORDER — GLYCOPYRROLATE 0.2 MG/ML
INJECTION INTRAMUSCULAR; INTRAVENOUS AS NEEDED
Status: DISCONTINUED | OUTPATIENT
Start: 2022-09-01 | End: 2022-09-01 | Stop reason: SURG

## 2022-09-01 RX ORDER — LIDOCAINE HYDROCHLORIDE 10 MG/ML
0.5 INJECTION, SOLUTION INFILTRATION; PERINEURAL ONCE AS NEEDED
Status: DISCONTINUED | OUTPATIENT
Start: 2022-09-01 | End: 2022-09-01 | Stop reason: HOSPADM

## 2022-09-01 RX ORDER — LIDOCAINE HYDROCHLORIDE 20 MG/ML
INJECTION, SOLUTION INFILTRATION; PERINEURAL AS NEEDED
Status: DISCONTINUED | OUTPATIENT
Start: 2022-09-01 | End: 2022-09-01 | Stop reason: SURG

## 2022-09-01 RX ORDER — ACETAMINOPHEN 500 MG
1000 TABLET ORAL EVERY 6 HOURS PRN
COMMUNITY

## 2022-09-01 RX ORDER — SODIUM CHLORIDE 0.9 % (FLUSH) 0.9 %
3 SYRINGE (ML) INJECTION EVERY 12 HOURS SCHEDULED
Status: DISCONTINUED | OUTPATIENT
Start: 2022-09-01 | End: 2022-09-01 | Stop reason: HOSPADM

## 2022-09-01 RX ORDER — SODIUM CHLORIDE, SODIUM LACTATE, POTASSIUM CHLORIDE, CALCIUM CHLORIDE 600; 310; 30; 20 MG/100ML; MG/100ML; MG/100ML; MG/100ML
1000 INJECTION, SOLUTION INTRAVENOUS CONTINUOUS
Status: DISCONTINUED | OUTPATIENT
Start: 2022-09-01 | End: 2022-09-01 | Stop reason: HOSPADM

## 2022-09-01 RX ADMIN — PROPOFOL 160 MCG/KG/MIN: 10 INJECTION, EMULSION INTRAVENOUS at 08:02

## 2022-09-01 RX ADMIN — LIDOCAINE HYDROCHLORIDE 60 MG: 20 INJECTION, SOLUTION INFILTRATION; PERINEURAL at 08:00

## 2022-09-01 RX ADMIN — PROPOFOL 150 MG: 10 INJECTION, EMULSION INTRAVENOUS at 08:00

## 2022-09-01 RX ADMIN — SODIUM CHLORIDE, POTASSIUM CHLORIDE, SODIUM LACTATE AND CALCIUM CHLORIDE 30 ML/HR: 600; 310; 30; 20 INJECTION, SOLUTION INTRAVENOUS at 07:38

## 2022-09-01 RX ADMIN — GLYCOPYRROLATE 0.2 MG: 0.2 INJECTION INTRAMUSCULAR; INTRAVENOUS at 08:00

## 2022-09-01 NOTE — ANESTHESIA PREPROCEDURE EVALUATION
Anesthesia Evaluation                  Airway   Mallampati: I  TM distance: >3 FB  Neck ROM: full  No difficulty expected  Dental - normal exam     Pulmonary - normal exam   Cardiovascular - normal exam        Neuro/Psych  (+) headaches, psychiatric history Anxiety,    GI/Hepatic/Renal/Endo    (+)  GI bleeding lower ,     Musculoskeletal     Abdominal  - normal exam    Bowel sounds: normal.   Substance History      OB/GYN          Other                        Anesthesia Plan    ASA 2     MAC     intravenous induction     Anesthetic plan, risks, benefits, and alternatives have been provided, discussed and informed consent has been obtained with: patient.        CODE STATUS:

## 2022-09-01 NOTE — H&P
No chief complaint on file.      HPI  Change in bowels   dyspepsia  Rectal bleeding         Problem List:    Patient Active Problem List   Diagnosis   • Migraine headache   • IUD (intrauterine device) in place   • Rectal bleeding   • H/O Clostridium difficile infection   • Abdominal pain   • Diarrhea   • Heartburn       Medical History:    Past Medical History:   Diagnosis Date   • Anesthesia     BLOOD PRESSURE DROPPED POST-OP WITH EAR TUBES AGE 4   • Anxiety    • Deviated septum    • History of Clostridium difficile colitis     NUMEROUS ANTIBIOTICS WITH STREP HX   • History of strep sore throat     7 TIMES IN 2020   • Migraines    • Rectal bleeding         Social History:    Social History     Socioeconomic History   • Marital status:    Tobacco Use   • Smoking status: Never Smoker   • Smokeless tobacco: Never Used   • Tobacco comment: vaping   Vaping Use   • Vaping Use: Former   Substance and Sexual Activity   • Alcohol use: Yes     Comment: OCC/SOCIALLY    • Drug use: Never   • Sexual activity: Yes       Family History:   Family History   Problem Relation Age of Onset   • Diabetes Mother    • Cancer Father         melanoma   • Hyperlipidemia Father    • Hypertension Father    • Epilepsy Father    • Migraines Paternal Aunt    • Diabetes Maternal Grandmother    • Cancer Paternal Grandfather         melanoma   • Malig Hyperthermia Neg Hx    • Colon cancer Neg Hx    • Colon polyps Neg Hx    • Crohn's disease Neg Hx    • Irritable bowel syndrome Neg Hx    • Ulcerative colitis Neg Hx        Surgical History:   Past Surgical History:   Procedure Laterality Date   • EAR TUBES     • SIGMOIDOSCOPY N/A 3/11/2022    Procedure: FLEXIBLE SIGMOIDOSCOPY WITH BIOPSIES;  Surgeon: Baron Ayoub MD;  Location: [facility];  Service: Gastroenterology;  Laterality: N/A;  Pre: rectal bleeding  Post: hemorrhoids   • TONSILLECTOMY Bilateral 7/13/2021    Procedure: TONSILLECTOMY;  Surgeon: Duc Alvarez MD;   Location: [facility] OR;  Service: ENT;  Laterality: Bilateral;   • WISDOM TOOTH EXTRACTION           Current Facility-Administered Medications:   •  lactated ringers infusion 1,000 mL, 1,000 mL, Intravenous, Continuous, Kaleb Joyner MD  •  lactated ringers infusion, 30 mL/hr, Intravenous, Continuous PRN, Viki Erazo, APRN  •  lidocaine (XYLOCAINE) 1 % injection 0.5 mL, 0.5 mL, Intradermal, Once PRN, Kaleb Joyner MD  •  sodium chloride 0.9 % flush 10 mL, 10 mL, Intravenous, PRN, Viki Erazo, APRN  •  sodium chloride 0.9 % flush 10 mL, 10 mL, Intravenous, PRN, Kaleb Joyner MD  •  sodium chloride 0.9 % flush 3 mL, 3 mL, Intravenous, Q12H, Viki Erazo, APRN    Allergies: No Known Allergies     The following portions of the patient's history were reviewed by me and updated as appropriate: review of systems, allergies, current medications, past family history, past medical history, past social history, past surgical history and problem list.    There were no vitals filed for this visit.    PHYSICAL EXAM:    CONSTITUTIONAL:  today's vital signs reviewed by me  GASTROINTESTINAL: abdomen is soft nontender nondistended with normal active bowel sounds, no masses are appreciated    Assessment/ Plan  Change in bowels   dyspepsia  Rectal bleeding    egd and colonoscopy    Risks and benefits as well as alternatives to endoscopic evaluation were explained to the patient and they voiced understanding and wish to proceed.  These risks include but are not limited to the risk of bleeding, perforation, adverse reaction to sedation, and missed lesions.  The patient was given the opportunity to ask questions prior to the endoscopic procedure.

## 2022-09-01 NOTE — DISCHARGE INSTRUCTIONS
For the next 24 hours patient needs to be with a responsible adult.    For 24 hours DO NOT drive, operate machinery, appliances, drink alcohol, make important decisions or sign legal documents.    Start with a light or bland diet and advance to regular diet as tolerated.    Follow recommendations on procedure report provided by your doctor.    Call Dr Joyner for problems [phone]    Problems may include but not limited to: large amounts of bleeding, trouble breathing, repeated vomiting, severe unrelieved pain, fever or chills.

## 2022-09-01 NOTE — ANESTHESIA POSTPROCEDURE EVALUATION
Patient: Tran Quintanilla    Procedure Summary     Date: 09/01/22 Room / Location:  [facility]    Anesthesia Start: 0756 Anesthesia Stop: 0828    Procedures:       ESOPHAGOGASTRODUODENOSCOPY WITH BIOPSIES (N/A )      COLONOSCOPY TO CECUM AND TERMINAL ILEUM WITH BIOPSIES (N/A ) Diagnosis:       Rectal bleeding      H/O Clostridium difficile infection      Abdominal pain, unspecified abdominal location      Diarrhea, unspecified type      Heartburn      (Rectal bleeding [K62.5])      (H/O Clostridium difficile infection [Z86.19])      (Abdominal pain, unspecified abdominal location [R10.9])      (Diarrhea, unspecified type [R19.7])      (Heartburn [R12])    Surgeons: Kaleb Joyner MD Provider: Sheryl Tracey MD    Anesthesia Type: MAC ASA Status: 2          Anesthesia Type: MAC    Vitals  Vitals Value Taken Time   /70 09/01/22 0850   Temp 36.4 °C (97.5 °F) 09/01/22 0838   Pulse 81 09/01/22 0850   Resp 16 09/01/22 0850   SpO2 98 % 09/01/22 0850           Post Anesthesia Care and Evaluation    Patient location during evaluation: bedside  Patient participation: complete - patient participated  Level of consciousness: awake  Pain management: adequate    Airway patency: patent  Anesthetic complications: No anesthetic complications    Cardiovascular status: acceptable  Respiratory status: acceptable  Hydration status: acceptable

## 2022-09-02 LAB
LAB AP CASE REPORT: NORMAL
LAB AP DIAGNOSIS COMMENT: NORMAL
PATH REPORT.FINAL DX SPEC: NORMAL
PATH REPORT.GROSS SPEC: NORMAL

## 2022-09-04 LAB — UREASE TISS QL: NEGATIVE

## 2022-10-19 ENCOUNTER — HOSPITAL ENCOUNTER (OUTPATIENT)
Dept: GENERAL RADIOLOGY | Facility: HOSPITAL | Age: 22
Discharge: HOME | End: 2022-10-19
Admitting: PHYSICIAN ASSISTANT

## 2022-10-19 ENCOUNTER — OFFICE VISIT (OUTPATIENT)
Dept: INTERNAL MEDICINE | Facility: CLINIC | Age: 22
End: 2022-10-19

## 2022-10-19 VITALS — BODY MASS INDEX: 22.82 KG/M2 | WEIGHT: 142 LBS | HEIGHT: 66 IN | TEMPERATURE: 97.8 F

## 2022-10-19 DIAGNOSIS — M25.559 HIP PAIN: Primary | ICD-10-CM

## 2022-10-19 PROCEDURE — 73502 X-RAY EXAM HIP UNI 2-3 VIEWS: CPT

## 2022-10-19 PROCEDURE — 99213 OFFICE O/P EST LOW 20 MIN: CPT | Performed by: PHYSICIAN ASSISTANT

## 2022-10-19 RX ORDER — CALCIUM POLYCARBOPHIL 625 MG
TABLET ORAL DAILY
COMMUNITY

## 2022-10-19 NOTE — PROGRESS NOTES
Subjective   Chief Complaint   Patient presents with   • Hip Pain     Been hurting since high school but has gotten worse last 6 months       History of Present Illness     Played soccer and high school has had hip pain since off and on. Had an xray initially was normal. Over the last 6 months has been much worse. Last 6 months has been worse. Had severe pain last week after cleaning her car and her house. Unable to get off the cough needed help from her . The following day a little better. Feels like her hip is locking up or clicking. If she turns her leg out, feels like her hip dislocates. She is popping the join 10-15 times  Day. Never had hip dysplasia as a child. Did not play college high school.      Patient Active Problem List   Diagnosis   • Migraine headache   • IUD (intrauterine device) in place   • Rectal bleeding   • H/O Clostridium difficile infection   • Abdominal pain   • Diarrhea   • Heartburn       No Known Allergies    Current Outpatient Medications on File Prior to Visit   Medication Sig Dispense Refill   • busPIRone (BUSPAR) 5 MG tablet Take 1 tablet by mouth 3 (Three) Times a Day. (Patient taking differently: Take 1 tablet by mouth Daily As Needed.) 90 tablet 1   • clindamycin (CLEOCIN T) 1 % lotion Apply  topically to the appropriate area as directed Every Morning.     • Levonorgestrel (KYLEENA IU) by Intrauterine route.     • polycarbophil (calcium polycarbophil) 625 MG tablet tablet Take  by mouth Daily.     • spironolactone (ALDACTONE) 100 MG tablet Take 100 mg by mouth Every Night. For acne     • tretinoin (RETIN-A) 0.025 % cream Apply 1 application topically to the appropriate area as directed Every Night.     • acetaminophen (TYLENOL) 500 MG tablet Take 1,000 mg by mouth Every 6 (Six) Hours As Needed for Mild Pain.     • dicyclomine (Bentyl) 10 MG capsule Take 1 capsule by mouth 4 (Four) Times a Day Before Meals & at Bedtime. (Patient taking differently: Take 1 capsule by mouth 4  (Four) Times a Day As Needed.) 90 capsule 0   • spironolactone (ALDACTONE) 100 MG tablet Take 1 tablet by mouth Daily. 30 tablet 6     No current facility-administered medications on file prior to visit.       Past Medical History:   Diagnosis Date   • Anesthesia     BLOOD PRESSURE DROPPED POST-OP WITH EAR TUBES AGE 4   • Anxiety    • Deviated septum    • History of Clostridium difficile colitis     NUMEROUS ANTIBIOTICS WITH STREP HX   • History of strep sore throat     7 TIMES IN 2020   • Migraines    • Rectal bleeding        Family History   Problem Relation Age of Onset   • Diabetes Mother    • Cancer Father         melanoma   • Hyperlipidemia Father    • Hypertension Father    • Epilepsy Father    • Migraines Paternal Aunt    • Diabetes Maternal Grandmother    • Cancer Paternal Grandfather         melanoma   • Malig Hyperthermia Neg Hx    • Colon cancer Neg Hx    • Colon polyps Neg Hx    • Crohn's disease Neg Hx    • Irritable bowel syndrome Neg Hx    • Ulcerative colitis Neg Hx        Social History     Socioeconomic History   • Marital status:    Tobacco Use   • Smoking status: Never   • Smokeless tobacco: Never   • Tobacco comments:     vaping   Vaping Use   • Vaping Use: Former   Substance and Sexual Activity   • Alcohol use: Yes     Comment: OCC/SOCIALLY    • Drug use: Never   • Sexual activity: Yes       Past Surgical History:   Procedure Laterality Date   • COLONOSCOPY N/A 9/1/2022    Procedure: COLONOSCOPY TO CECUM AND TERMINAL ILEUM WITH BIOPSIES;  Surgeon: Kaleb Joyner MD;  Location: [facility] END[facility]COPY;  Service: Gastroenterology;  Laterality: N/A;  PRE- BLOOD IN STOOL  POST- HEMORRHOIDS, ANAL FISSURE   • EAR TUBES     • ENDOSCOPY N/A 9/1/2022    Procedure: ESOPHAGOGASTRODUODENOSCOPY WITH BIOPSIES;  Surgeon: Kaleb Joyner MD;  Location: [facility] ENDOSCOPY;  Service: Gastroenterology;  Laterality: N/A;  PRE- ABDOMINAL PAIN  POST- MILD GASTRITIS   • SIGMOIDOSCOPY N/A 3/11/2022    Procedure:  FLEXIBLE SIGMOIDOSCOPY WITH BIOPSIES;  Surgeon: Baron Ayoub MD;  Location: [facility] ENDOSCOPY;  Service: Gastroenterology;  Laterality: N/A;  Pre: rectal bleeding  Post: hemorrhoids   • TONSILLECTOMY Bilateral 7/13/2021    Procedure: TONSILLECTOMY;  Surgeon: Duc Alvarez MD;  Location: [facility] MA[facility] OR;  Service: ENT;  Laterality: Bilateral;   • WISDOM TOOTH EXTRACTION         The following portions of the patient's history were reviewed and updated as appropriate: problem list, allergies, current medications, past medical history, past family history, past social history and past surgical history.    ROS  See hpi  Immunization History   Administered Date(s) Administered   • COVID-19 (PFIZER) PURPLE CAP 12/22/2020, 01/12/2021   • DTaP 1998, 1998, 01/21/1999, 01/13/2000, 07/02/2002   • Flu Vaccine Intradermal Quad 18-64YR 10/23/2007, 10/30/2015   • Flu Vaccine Split Quad 02/28/2017, 10/06/2017, 09/20/2018, 09/24/2019   • FluLaval/Fluzone >6mos 02/28/2017, 10/06/2017   • Fluzone Quad >6mos (Multi-dose) 10/24/2001   • HPV Quadrivalent 07/13/2009, 09/14/2009, 01/15/2010   • Hep A / Hep B 01/02/2018, 02/07/2018, 07/12/2018   • Hep A, 2 Dose 10/24/2008, 07/13/2009, 07/31/2009   • Hep B, Adolescent or Pediatric 1998, 1998, 01/21/1999   • Hib (HbOC) 1998, 1998, 01/21/1999, 01/13/2000   • Hib (PRP-OMP) 1998, 1998, 01/21/1999, 01/13/2000   • Hpv9 11/09/2017   • IPV 1998, 1998, 01/13/2000, 07/12/2002   • Influenza LAIV (Nasal) 10/24/2008   • Influenza Quad Vaccine (Inpatient) 10/24/2001   • MMR 10/11/1999, 07/12/2002   • Meningococcal MCV4P (Menactra) 09/14/2009, 11/09/2017   • OPV 1998, 1998, 01/13/2000   • PPD Test 02/28/2017, 11/09/2017   • Pneumococcal Conjugate 13-Valent (PCV13) 11/22/2000   • Tdap 07/13/2009, 12/01/2017   • Varicella 07/15/1999, 01/13/2000, 10/24/2008, 02/07/2018       Objective   Vitals:    10/19/22 1346   Temp: 97.8 °F  "(36.6 °C)   Weight: 64.4 kg (142 lb)   Height: 167.6 cm (65.98\")     Body mass index is 22.93 kg/m².  Physical Exam  Vitals reviewed.   Constitutional:       Appearance: Normal appearance.   HENT:      Head: Normocephalic and atraumatic.   Cardiovascular:      Rate and Rhythm: Normal rate and regular rhythm.   Musculoskeletal:      Comments: Full ROM of hips, some crepitus noted on exam   Neurological:      Mental Status: She is alert.           Assessment & Plan   Diagnoses and all orders for this visit:    1. Hip pain (Primary)  -     Cancel: XR Pelvis 1 or 2 View  -     Ambulatory Referral to Orthopedic Surgery  -     XR Hip With or Without Pelvis 2 - 3 View Right    Will get xrays and refer to ortho for further evaluation    No follow-ups on file.           Answers for HPI/ROS submitted by the patient on 10/14/2022  Please describe your symptoms.: Worsening right hip pain. Pain goes down rt leg and wraps around into my hip. My hip joint will lock up sometimes and make a loud “pop” noise. Flor had hip issues since HS, but it has became alot worse within the last 3 months  Have you had these symptoms before?: Yes  How long have you been having these symptoms?: Greater than 2 weeks  Please describe any probable cause for these symptoms. : I played soccer in  and have had right hip pain/problems since. I went hiking this past weekend and notice my hip pain/discomfort getting worse. Sometimes my rt hip will lock up and when i move it to the right or left it makes a “pop” noise then I have a little bit of relief. It pops 10-15 times a day. The pain was so bad last wednesday I could barely get off the couch and was in tears.  What is the primary reason for your visit?: Other      "

## 2022-11-04 ENCOUNTER — OFFICE VISIT (OUTPATIENT)
Dept: ORTHOPEDIC SURGERY | Facility: CLINIC | Age: 22
End: 2022-11-04

## 2022-11-04 ENCOUNTER — HOSPITAL ENCOUNTER (OUTPATIENT)
Dept: MRI IMAGING | Facility: HOSPITAL | Age: 22
Discharge: HOME | End: 2022-11-04
Admitting: ORTHOPAEDIC SURGERY

## 2022-11-04 VITALS — HEIGHT: 66 IN | BODY MASS INDEX: 22.77 KG/M2 | WEIGHT: 141.7 LBS | TEMPERATURE: 97.1 F

## 2022-11-04 DIAGNOSIS — S73.191A TEAR OF RIGHT ACETABULAR LABRUM, INITIAL ENCOUNTER: Primary | ICD-10-CM

## 2022-11-04 DIAGNOSIS — S73.191A TEAR OF RIGHT ACETABULAR LABRUM, INITIAL ENCOUNTER: ICD-10-CM

## 2022-11-04 PROCEDURE — 73721 MRI JNT OF LWR EXTRE W/O DYE: CPT

## 2022-11-04 PROCEDURE — 99204 OFFICE O/P NEW MOD 45 MIN: CPT | Performed by: ORTHOPAEDIC SURGERY

## 2022-11-06 NOTE — PROGRESS NOTES
General Exam    Patient: Tran Quintanilla    YOB: 2000    Medical Record Number: [id number]    Chief Complaints: Right hip pain    History of Present Illness:     22 y.o. female patient who presents for evaluation treatment right hip pain.  Patient states she noticed symptoms initially in high school when she was playing soccer.  She states symptoms have progressed over the past few years.  She reports pain with clicking and popping of her hip and feeling that it locks up.  She reports most of her symptoms are anteriorly based and feels deep.  Particular activity such as squatting and hiking or uneven ground seem to worsen her condition.  She states that its become more disruptive to normal daily activities.    Denies any numbness or tingling.  Denies any fevers, cough or shortness of breath.    Allergies: No Known Allergies    Home Medications:      Current Outpatient Medications:   •  acetaminophen (TYLENOL) 500 MG tablet, Take 1,000 mg by mouth Every 6 (Six) Hours As Needed for Mild Pain., Disp: , Rfl:   •  busPIRone (BUSPAR) 5 MG tablet, Take 1 tablet by mouth 3 (Three) Times a Day. (Patient taking differently: Take 1 tablet by mouth Daily As Needed.), Disp: 90 tablet, Rfl: 1  •  clindamycin (CLEOCIN T) 1 % lotion, Apply  topically to the appropriate area as directed Every Morning., Disp: , Rfl:   •  dicyclomine (Bentyl) 10 MG capsule, Take 1 capsule by mouth 4 (Four) Times a Day Before Meals & at Bedtime. (Patient taking differently: Take 1 capsule by mouth 4 (Four) Times a Day As Needed.), Disp: 90 capsule, Rfl: 0  •  Levonorgestrel (KYLEENA IU), by Intrauterine route., Disp: , Rfl:   •  polycarbophil 625 MG tablet tablet, Take  by mouth Daily., Disp: , Rfl:   •  spironolactone (ALDACTONE) 100 MG tablet, Take 100 mg by mouth Every Night. For acne, Disp: , Rfl:   •  spironolactone (ALDACTONE) 100 MG tablet, Take 1 tablet by mouth Daily., Disp: 30 tablet, Rfl: 6  •  tretinoin (RETIN-A) 0.025 % cream,  Apply 1 application topically to the appropriate area as directed Every Night., Disp: , Rfl:     Past Medical History:   Diagnosis Date   • Anesthesia     BLOOD PRESSURE DROPPED POST-OP WITH EAR TUBES AGE 4   • Anxiety    • Deviated septum    • History of Clostridium difficile colitis     NUMEROUS ANTIBIOTICS WITH STREP HX   • History of strep sore throat     7 TIMES IN 2020   • Migraines    • Rectal bleeding        Past Surgical History:   Procedure Laterality Date   • COLONOSCOPY N/A 09/01/2022    Procedure: COLONOSCOPY TO CECUM AND TERMINAL ILEUM WITH BIOPSIES;  Surgeon: Kaleb Joyner MD;  Location: [facility] ENDOSCOPY;  Service: Gastroenterology;  Laterality: N/A;  PRE- BLOOD IN STOOL  POST- HEMORRHOIDS, ANAL FISSURE   • EAR TUBES     • ENDOSCOPY N/A 09/01/2022    Procedure: ESOPHAGOGASTRODUODENOSCOPY WITH BIOPSIES;  Surgeon: Kaleb Joyner MD;  Location: [facility] ENDOSCOPY;  Service: Gastroenterology;  Laterality: N/A;  PRE- ABDOMINAL PAIN  POST- MILD GASTRITIS   • SIGMOIDOSCOPY N/A 03/11/2022    Procedure: FLEXIBLE SIGMOIDOSCOPY WITH BIOPSIES;  Surgeon: Baron Ayoub MD;  Location: [facility] ENDOSCOPY;  Service: Gastroenterology;  Laterality: N/A;  Pre: rectal bleeding  Post: hemorrhoids   • TONSILLECTOMY Bilateral 07/13/2021    Procedure: TONSILLECTOMY;  Surgeon: Duc Alvarez MD;  Location: [facility] MAIN OR;  Service: ENT;  Laterality: Bilateral;   • WISDOM TOOTH EXTRACTION         Social History     Occupational History   • Not on file   Tobacco Use   • Smoking status: Never   • Smokeless tobacco: Never   • Tobacco comments:     vaping   Vaping Use   • Vaping Use: Former   Substance and Sexual Activity   • Alcohol use: Yes     Comment: Social   • Drug use: Never   • Sexual activity: Yes     Partners: Male     Birth control/protection: Condom, I.U.D., Same-sex partner      Social History     Social History Narrative   • Not on file       Family History   Problem Relation Age of Onset   • Diabetes  "Mother    • Cancer Father         Melanoma   • Hyperlipidemia Father    • Hypertension Father    • Epilepsy Father    • Migraines Paternal Aunt    • Diabetes Maternal Grandmother         CHF & Diabetes   • Cancer Paternal Grandfather         Melanoma   • Malig Hyperthermia Neg Hx    • Colon cancer Neg Hx    • Colon polyps Neg Hx    • Crohn's disease Neg Hx    • Irritable bowel syndrome Neg Hx    • Ulcerative colitis Neg Hx        Review of Systems:      Constitutional: Denies fever, shaking or chills     All other pertinent positives and negatives as noted above in HPI.    Physical Exam: 22 y.o. female    Vitals:    11/04/22 0847   Temp: 97.1 °F (36.2 °C)   Weight: 64.3 kg (141 lb 11.2 oz)   Height: 167.6 cm (66\")       General:  Patient is awake and alert.  Appears in no acute distress or discomfort.        Musculoskeletal/Extremities:    Right lower extremity examined no tenderness palpation about the hip.  Range of motion is overall full and painless.  Positive FADIR.  Negative FLORIAN.  Negative straight leg raise.  Negative Valsalva.  Motor and sensory intact distally.         Radiology:       Previous right hip films were reviewed to evaluate the patient's complaint/s.    Imaging demonstrates hip joint space appears well-preserved.  There may be a slight crossover sign suggestive of acetabular retroversion.  Possible early prominence beginning about the femoral head neck junction suggestive of potential cam lesion but is not overly prominent.  No acute fractures noted.     No imaging for comparison.    Assessment: Right hip pain with possible labral tear versus coxa saltans      Plan:      I discussed the findings with the patient I would like to get further evaluation of the hip joint itself tickly the labrum so we will order an MRI for further evaluation.  Told the patient to call me several days after we can review the results together.  If labral involvement we will consider referral.  If negative findings " we will consider pursuing conservative treatment to begin for coxa saltans.         We will plan for follow up 3 weeks.    All questions were answered.  Patient understands and agrees with the plan.    Olvin Mane MD    11/04/2022    CC to Ruby Rangel PA-C

## 2022-11-07 ENCOUNTER — TELEPHONE (OUTPATIENT)
Dept: ORTHOPEDIC SURGERY | Facility: CLINIC | Age: 22
End: 2022-11-07

## 2022-11-07 DIAGNOSIS — M24.851 SNAPPING HIP SYNDROME, RIGHT: Primary | ICD-10-CM

## 2022-11-07 NOTE — TELEPHONE ENCOUNTER
I called patient went over her results overall MRI findings were negative.  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  Instructed her to try some anti-inflammatories we will order for physical therapy.  If warranted going further may consider referral to sports medicine for evaluation for injection.    PT order placed.  Patient understands agrees the plan.  All questions were answered.

## 2022-12-05 ENCOUNTER — HOSPITAL ENCOUNTER (OUTPATIENT)
Dept: PHYSICAL THERAPY | Facility: HOSPITAL | Age: 22
Setting detail: THERAPIES SERIES
Discharge: HOME | End: 2022-12-05

## 2022-12-05 DIAGNOSIS — M25.551 RIGHT HIP PAIN: Primary | ICD-10-CM

## 2022-12-05 DIAGNOSIS — R53.1 WEAKNESS: ICD-10-CM

## 2022-12-05 PROCEDURE — 97161 PT EVAL LOW COMPLEX 20 MIN: CPT

## 2022-12-05 PROCEDURE — 97110 THERAPEUTIC EXERCISES: CPT

## 2022-12-05 NOTE — THERAPY EVALUATION
Outpatient Physical Therapy Ortho Initial Evaluation  [facility]     Patient Name: Tran Quintanilla  : 2000  MRN: [id number]  Today's Date: 2022      Visit Date: 2022    Patient Active Problem List   Diagnosis   • Migraine headache   • IUD (intrauterine device) in place   • Rectal bleeding   • H/O Clostridium difficile infection   • Abdominal pain   • Diarrhea   • Heartburn        Past Medical History:   Diagnosis Date   • Anesthesia     BLOOD PRESSURE DROPPED POST-OP WITH EAR TUBES AGE 4   • Anxiety    • Deviated septum    • History of Clostridium difficile colitis     NUMEROUS ANTIBIOTICS WITH STREP HX   • History of strep sore throat     7 TIMES IN    • Migraines    • Rectal bleeding         Past Surgical History:   Procedure Laterality Date   • COLONOSCOPY N/A 2022    Procedure: COLONOSCOPY TO CECUM AND TERMINAL ILEUM WITH BIOPSIES;  Surgeon: Kaleb Joyner MD;  Location: [facility] ENDOSCOPY;  Service: Gastroenterology;  Laterality: N/A;  PRE- BLOOD IN STOOL  POST- HEMORRHOIDS, ANAL FISSURE   • EAR TUBES     • ENDOSCOPY N/A 2022    Procedure: ESOPHAGOGASTRODUODENOSCOPY WITH BIOPSIES;  Surgeon: Kaleb Joyner MD;  Location: [facility] ENDOSCOPY;  Service: Gastroenterology;  Laterality: N/A;  PRE- ABDOMINAL PAIN  POST- MILD GASTRITIS   • SIGMOIDOSCOPY N/A 2022    Procedure: FLEXIBLE SIGMOIDOSCOPY WITH BIOPSIES;  Surgeon: Barno Ayoub MD;  Location: [facility] ENDOSCOPY;  Service: Gastroenterology;  Laterality: N/A;  Pre: rectal bleeding  Post: hemorrhoids   • TONSILLECTOMY Bilateral 2021    Procedure: TONSILLECTOMY;  Surgeon: Duc Alvarez MD;  Location: [facility] OR;  Service: ENT;  Laterality: Bilateral;   • WISDOM TOOTH EXTRACTION         Visit Dx:     ICD-10-CM ICD-9-CM   1. Right hip pain  M25.551 719.45   2. Weakness  R53.1 780.79          Patient History     Row Name 22 1500             History    Brief Description of Current Complaint Tran  YVONNE Quintanilla is a 22 y.o. female who presents today with R hip pain. Patient reports deep anterior hip pain that started with HS soccer or shortly after. Her hip will lock on her and she has to pop it to be able to move it again. She feels it grinding with hip rotation and cannot do squat workouts, clean her house, and especially scrub her tub without pain. If she does too much cleaning she cannot even stand until she pops her hip and then still has pain while trying to sleep. 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 Radiographs showed slight acetabular retroversion and slight cam lesion, but MRI showed no soft tissue abnormalities. She did not get an MRA. Patient has past medical history of migraines.  -LW      Patient/Caregiver Goals Comment Working out without pain, doing housework without pain.  -LW      Occupation/sports/leisure activities MRI tech, working out  -LW         Pain     Pain Location Hip  -LW      Pain at Present 0  -LW      Pain at Best 0  -LW      Pain at Worst 10  -LW      Pain Frequency Intermittent  -LW      What Performance Factors Make the Current Problem(s) BETTER? popping it  -LW      Tolerance Time- Walking No issue.  -LW      Is your sleep disturbed? Yes  If she flares it up during the day  -LW      Difficulties at work? No  -LW         Fall Risk Assessment    Any falls in the past year: No  -LW         Daily Activities    Primary Language English  -LW         Safety    Are you being hurt, hit, or frightened by anyone at home or in your life? No  -LW      Are you being neglected by a caregiver No  -LW            User Key  (r) = Recorded By, (t) = Taken By, (c) = Cosigned By    Initials Name Provider Type    LW Sheryl Fox, PT Physical Therapist                 PT Ortho     Row Name 12/05/22 1500       Hip Special Tests    FLORIAN (hip vs SI pathology) Right:;Positive  Painful  -LW    Hip scour test (labral vs hip pathology)  Right:;Positive  Painful in back  -LW       Leg Length Test    Apparent Left Higher Leg  ASIS, PSIS, and iliac crest high  -LW       MMT (Manual Muscle Testing)    Rt Lower Ext Rt Hip Flexion;Rt Hip Extension;Rt Hip ABduction;Rt Hip ADduction;Rt Knee Extension;Rt Knee Flexion;Rt Ankle Plantarflexion;Rt Ankle Dorsiflexion  -LW    Lt Lower Ext Lt Hip Flexion;Lt Hip Extension;Lt Hip ABduction;Lt Hip ADduction;Lt Knee Extension;Lt Knee Flexion;Lt Ankle Plantarflexion;Lt Ankle Dorsiflexion  -LW       MMT Right Lower Ext    Rt Hip Flexion MMT, Gross Movement (4/5) good  -LW    Rt Hip Extension MMT, Gross Movement (4+/5) good plus  -LW    Rt Hip ABduction MMT, Gross Movement (4/5) good  -LW    Rt Hip ADduction MMT, Gross Movement (4+/5) good plus  -LW    Rt Knee Extension MMT, Gross Movement (5/5) normal  -LW    Rt Knee Flexion MMT, Gross Movement (5/5) normal  -LW    Rt Ankle Plantarflexion MMT, Gross Movement (5/5) normal  -LW    Rt Ankle Dorsiflexion MMT, Gross Movement (5/5) normal  -LW       MMT Left Lower Ext    Lt Hip Flexion MMT, Gross Movement (4+/5) good plus  -LW    Lt Hip Extension MMT, Gross Movement (5/5) normal  -LW    Lt Hip ABduction MMT, Gross Movement (5/5) normal  -LW    Lt Hip ADduction MMT, Gross Movement (5/5) normal  -LW    Lt Knee Extension MMT, Gross Movement (5/5) normal  -LW    Lt Knee Flexion MMT, Gross Movement (5/5) normal  -LW    Lt Ankle Plantarflexion MMT, Gross Movement (5/5) normal  -LW    Lt Ankle Dorsiflexion MMT, Gross Movement (5/5) normal  -LW       Sensation    Sensation WNL? WNL  -LW       Lower Extremity Flexibility    Hamstrings Bilateral:;WNL  -LW    Hip Flexors Bilateral:;WNL  -LW    Hip External Rotators Bilateral:;WNL  -LW    Hip Internal Rotators Bilateral:;WNL  R mildly painful  -LW          User Key  (r) = Recorded By, (t) = Taken By, (c) = Cosigned By    Initials Name Provider Type    LW Sheryl Fox PT Physical Therapist                            Therapy  Education  Education Details: Educated on anatomy/pathology, evaluation findings, therapy expectations, POC, and HEP  Given: HEP, Symptoms/condition management, Pain management  Program: New  How Provided: Verbal, Demonstration, Written  Provided to: Patient  Level of Understanding: Verbalized, Demonstrated      PT OP Goals     Row Name 12/05/22 1700          PT Short Term Goals    STG Date to Achieve 01/04/23  -     STG 1 Patient will be independent with initial HEP for hip stability.  -LW     STG 1 Progress New  -     STG 2 Patient will demonstrate 5/5 B hip strength to improve capacity for functional mobility including working out and doing housework.  -LW     STG 2 Progress New  -LW     STG 3 Patient will report a 50% improvement in incidences of hip popping, locking up, and pain to demonstrate improved symptoms and stability.  -     STG 3 Progress New  -        Long Term Goals    LTG Date to Achieve 02/03/23  -LW     LTG 1 Patient will be independent with finalized HEP to maintain function and prevent return of symptoms.  -LW     LTG 1 Progress New  -     LTG 2 Patient will score 70/80 on the LEFS to demonstrate improved functional mobility and decreased limitation on daily activities from R hip pain and instability.  -LW     LTG 2 Progress New  -     LTG 3 Patient will report a 80% improvement in incidences of hip popping, locking up, and pain to demonstrate improved symptoms and stability.  -     LTG 3 Progress New  -        Time Calculation    PT Goal Re-Cert Due Date 03/05/23  -           User Key  (r) = Recorded By, (t) = Taken By, (c) = Cosigned By    Initials Name Provider Type    Sheryl Mckeon, PT Physical Therapist                 PT Assessment/Plan     Row Name 12/05/22 1600          PT Assessment    Functional Limitations Limitation in home management;Limitations in functional capacity and performance;Performance in leisure activities;Performance in self-care ADL;Performance in  sport activities  -LW     Impairments Pain;Muscle strength;Poor body mechanics  -LW     Assessment Comments Tran Quintanilla is a 22 y.o. 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 She presents with a stable clinical presentation, along with no remarkable comorbidities or personal factors that may impact her progress in the plan of care. Pt presents today with pelvic misalignment; positive hip scour, FLORIAN tests; R hip weakness; and no hip range of motion deficits. Her signs and symptoms are consistent with a physical therapy diagnosis of R hip pain. She likely has some instability given pelvic alignment and weakness. The previous impairments limit her ability to do housework, work out. Pt will benefit from skilled PT to address the previous impairments and return to PLOF.  -LW     Please refer to paper survey for additional self-reported information Yes  -LW     Rehab Potential Good  -LW     Patient/caregiver participated in establishment of treatment plan and goals Yes  -LW     Patient would benefit from skilled therapy intervention Yes  -LW        PT Plan    PT Frequency 2x/week  -LW     Predicted Duration of Therapy Intervention (PT) 10-12 visits over 6-8 weeks  -LW     Planned CPT's? PT EVAL LOW COMPLEXITY: 95789;PT RE-EVAL: 43677;PT THER PROC EA 15 MIN: 71136;PT THER ACT EA 15 MIN: 95114;PT MANUAL THERAPY EA 15 MIN: 92932;PT NEUROMUSC RE-EDUCATION EA 15 MIN: 02956;PT GAIT TRAINING EA 15 MIN: 64575;PT SELF CARE/HOME MGMT/TRAIN EA 15: 87079;PT HOT OR COLD PACK TREAT MCARE;PT ELECTRICAL STIM UNATTEND:   -LW     PT Plan Comments Assess pelvic alignment and response to adjustment. Assess response to initial HEP. Progress hip stability as tolerated: consider sl clam, side steps, bridges, step ups, heel taps, hl hip add. Update HEP as needed.  -LW           User Key  (r) = Recorded By, (t) = Taken By, (c) = Cosigned By    Initials Name Provider Type    Sheryl Mckeon, PT Physical  Therapist                   OP Exercises     Row Name 12/05/22 1700             Total Minutes    12254 - PT Therapeutic Exercise Minutes 8  -LW         Exercise 1    Exercise Name 1 RCB  -LW      Additional Comments Next session  -LW         Exercise 2    Exercise Name 2 SLR  -LW      Cueing 2 Verbal;Tactile  -LW      Sets 2 1  -LW      Reps 2 10  -LW         Exercise 3    Exercise Name 3 sl abd  -LW      Cueing 3 Verbal;Tactile  -LW      Sets 3 1  -LW      Reps 3 10  -LW         Exercise 4    Exercise Name 4 prone hip ext  -LW      Cueing 4 Verbal;Tactile  -LW      Sets 4 1  -LW      Reps 4 10  -LW            User Key  (r) = Recorded By, (t) = Taken By, (c) = Cosigned By    Initials Name Provider Type    Sheryl Mckeon PT Physical Therapist                                 Lower Extremity Functional Index  Any of your usual work, housework or school activities: Quite a bit of difficulty  Your usual hobbies, recreational or sporting activities: Quite a bit of difficulty  Getting into or out of the bath: No difficulty  Walking between rooms: No difficulty  Putting on your shoes or socks: No difficulty  Squatting: Quite a bit of difficulty  Lifting an object, like a bag of groceries from the floor: No difficulty  Performing light activities around your home: Moderate difficulty  Performing heavy activities around your home: Moderate difficulty  Getting into or out of a car: No difficulty  Walking 2 blocks: No difficulty  Walking a mile: A little bit of difficulty  Going up or down 10 stairs (about 1 flight of stairs): A little bit of difficulty  Standing for 1 hour: A little bit of difficulty  Sitting for 1 hour: A little bit of difficulty  Running on even ground: A little bit of difficulty  Running on uneven ground: A little bit of difficulty  Making sharp turns while running fast: No difficulty  Hopping: No difficulty  Rolling over in bed: A little bit of difficulty  Total: 60      Time Calculation:     Start  Time: 1535  Stop Time: 1615  Time Calculation (min): 40 min  Total Timed Code Minutes- PT: 8 minute(s)  Timed Charges  38601 - PT Therapeutic Exercise Minutes: 8  Untimed Charges  PT Eval/Re-eval Minutes: 32  Total Minutes  Timed Charges Total Minutes: 8  Untimed Charges Total Minutes: 32   Total Minutes: 40     Therapy Charges for Today     Code Description Service Date Service Provider Modifiers Qty    79169811886 HC PT THER PROC EA 15 MIN 12/5/2022 Sheryl Fox, PT GP 1    61552238796 HC PT EVAL LOW COMPLEXITY 2 12/5/2022 Sheryl Fox, PT GP 1          PT G-Codes  Total: 60         Sheryl Fox PT  12/5/2022

## 2022-12-07 ENCOUNTER — OFFICE VISIT (OUTPATIENT)
Dept: INTERNAL MEDICINE | Facility: CLINIC | Age: 22
End: 2022-12-07

## 2022-12-07 VITALS
SYSTOLIC BLOOD PRESSURE: 100 MMHG | WEIGHT: 146 LBS | HEIGHT: 66 IN | DIASTOLIC BLOOD PRESSURE: 60 MMHG | TEMPERATURE: 97.8 F | BODY MASS INDEX: 23.46 KG/M2

## 2022-12-07 DIAGNOSIS — G43.909 MIGRAINE WITHOUT STATUS MIGRAINOSUS, NOT INTRACTABLE, UNSPECIFIED MIGRAINE TYPE: Primary | ICD-10-CM

## 2022-12-07 PROCEDURE — 99213 OFFICE O/P EST LOW 20 MIN: CPT | Performed by: PHYSICIAN ASSISTANT

## 2022-12-07 RX ORDER — SUMATRIPTAN 50 MG/1
TABLET, FILM COATED ORAL
Qty: 9 TABLET | Refills: 0 | Status: SHIPPED | OUTPATIENT
Start: 2022-12-07

## 2022-12-07 RX ORDER — TOPIRAMATE 25 MG/1
TABLET ORAL
Qty: 35 TABLET | Refills: 0 | Status: SHIPPED | OUTPATIENT
Start: 2022-12-07 | End: 2022-12-30

## 2022-12-07 RX ORDER — PROMETHAZINE HYDROCHLORIDE 25 MG/1
25 TABLET ORAL EVERY 6 HOURS PRN
Qty: 30 TABLET | Refills: 0 | Status: SHIPPED | OUTPATIENT
Start: 2022-12-07

## 2022-12-07 NOTE — PROGRESS NOTES
Subjective   Chief Complaint   Patient presents with   • Migraine     With impaired vision       History of Present Illness     Had a migraine 3 days ago that she woke up with had vision changes (happened in the past) and vomiting. Lasted the whole day. Did tylenol and benadryl and a cold compress, eventually broke. Halast migraine prior to his was a week prior and lasted a few days. She has peripheral vision loss, happened a lot in college, had a MRI that was normal. Has zig zag auras peripherally. Her migraines are typically all frontal, this past week.      Patient Active Problem List   Diagnosis   • Migraine headache   • IUD (intrauterine device) in place   • Rectal bleeding   • H/O Clostridium difficile infection   • Abdominal pain   • Diarrhea   • Heartburn       No Known Allergies    Current Outpatient Medications on File Prior to Visit   Medication Sig Dispense Refill   • acetaminophen (TYLENOL) 500 MG tablet Take 1,000 mg by mouth Every 6 (Six) Hours As Needed for Mild Pain.     • busPIRone (BUSPAR) 5 MG tablet Take 1 tablet by mouth 3 (Three) Times a Day. (Patient taking differently: Take 5 mg by mouth Daily As Needed.) 90 tablet 1   • clindamycin (CLEOCIN T) 1 % lotion Apply  topically to the appropriate area as directed Every Morning.     • dicyclomine (Bentyl) 10 MG capsule Take 1 capsule by mouth 4 (Four) Times a Day Before Meals & at Bedtime. (Patient taking differently: Take 10 mg by mouth 4 (Four) Times a Day As Needed.) 90 capsule 0   • Levonorgestrel (KYLEENA IU) by Intrauterine route.     • polycarbophil 625 MG tablet tablet Take  by mouth Daily.     • spironolactone (ALDACTONE) 100 MG tablet Take 100 mg by mouth Every Night. For acne     • spironolactone (ALDACTONE) 100 MG tablet Take 1 tablet by mouth Daily. 30 tablet 6   • tretinoin (RETIN-A) 0.025 % cream Apply 1 application topically to the appropriate area as directed Every Night.       No current facility-administered medications on file  prior to visit.       Past Medical History:   Diagnosis Date   • Anesthesia     BLOOD PRESSURE DROPPED POST-OP WITH EAR TUBES AGE 4   • Anxiety    • Deviated septum    • History of Clostridium difficile colitis     NUMEROUS ANTIBIOTICS WITH STREP HX   • History of strep sore throat     7 TIMES IN 2020   • Migraines    • Rectal bleeding        Family History   Problem Relation Age of Onset   • Diabetes Mother    • Cancer Father         Melanoma   • Hyperlipidemia Father    • Hypertension Father    • Epilepsy Father    • Migraines Paternal Aunt    • Diabetes Maternal Grandmother         CHF & Diabetes   • Cancer Paternal Grandfather         Melanoma   • Malig Hyperthermia Neg Hx    • Colon cancer Neg Hx    • Colon polyps Neg Hx    • Crohn's disease Neg Hx    • Irritable bowel syndrome Neg Hx    • Ulcerative colitis Neg Hx        Social History     Socioeconomic History   • Marital status:    Tobacco Use   • Smoking status: Never   • Smokeless tobacco: Never   • Tobacco comments:     vaping   Vaping Use   • Vaping Use: Former   Substance and Sexual Activity   • Alcohol use: Yes     Comment: Social   • Drug use: Never   • Sexual activity: Yes     Partners: Male     Birth control/protection: Condom, I.U.D., Same-sex partner       Past Surgical History:   Procedure Laterality Date   • COLONOSCOPY N/A 09/01/2022    Procedure: COLONOSCOPY TO CECUM AND TERMINAL ILEUM WITH BIOPSIES;  Surgeon: Kaleb Joyner MD;  Location: [facility] ENDOSCOPY;  Service: Gastroenterology;  Laterality: N/A;  PRE- BLOOD IN STOOL  POST- HEMORRHOIDS, ANAL FISSURE   • EAR TUBES     • ENDOSCOPY N/A 09/01/2022    Procedure: ESOPHAGOGASTRODUODENOSCOPY WITH BIOPSIES;  Surgeon: Kaleb Joyner MD;  Location: [facility] ENDOSCOPY;  Service: Gastroenterology;  Laterality: N/A;  PRE- ABDOMINAL PAIN  POST- MILD GASTRITIS   • SIGMOIDOSCOPY N/A 03/11/2022    Procedure: FLEXIBLE SIGMOIDOSCOPY WITH BIOPSIES;  Surgeon: Baron Ayoub MD;  Location:  "[facility] ENDOSCOPY;  Service: Gastroenterology;  Laterality: N/A;  Pre: rectal bleeding  Post: hemorrhoids   • TONSILLECTOMY Bilateral 07/13/2021    Procedure: TONSILLECTOMY;  Surgeon: Duc Alvarez MD;  Location: [facility] OR;  Service: ENT;  Laterality: Bilateral;   • WISDOM TOOTH EXTRACTION           The following portions of the patient's history were reviewed and updated as appropriate: problem list, allergies, current medications, past medical history, past family history, past social history and past surgical history.    ROS     See HPI    Immunization History   Administered Date(s) Administered   • COVID-19 (PFIZER) PURPLE CAP 12/22/2020, 01/12/2021   • DTaP 1998, 1998, 01/21/1999, 01/13/2000, 07/02/2002   • Flu Vaccine Intradermal Quad 18-64YR 10/23/2007, 10/30/2015   • Flu Vaccine Split Quad 02/28/2017, 10/06/2017, 09/20/2018, 09/24/2019   • FluLaval/Fluzone >6mos 02/28/2017, 10/06/2017   • Fluzone Quad >6mos (Multi-dose) 10/24/2001   • HPV Quadrivalent 07/13/2009, 09/14/2009, 01/15/2010   • Hep A / Hep B 01/02/2018, 02/07/2018, 07/12/2018   • Hep A, 2 Dose 10/24/2008, 07/13/2009, 07/31/2009   • Hep B, Adolescent or Pediatric 1998, 1998, 01/21/1999   • Hib (HbOC) 1998, 1998, 01/21/1999, 01/13/2000   • Hib (PRP-OMP) 1998, 1998, 01/21/1999, 01/13/2000   • Hpv9 11/09/2017   • IPV 1998, 1998, 01/13/2000, 07/12/2002   • Influenza LAIV (Nasal) 10/24/2008   • Influenza Quad Vaccine (Inpatient) 10/24/2001   • MMR 10/11/1999, 07/12/2002   • Meningococcal MCV4P (Menactra) 09/14/2009, 11/09/2017   • OPV 1998, 1998, 01/13/2000   • PPD Test 02/28/2017, 11/09/2017   • Pneumococcal Conjugate 13-Valent (PCV13) 11/22/2000   • Tdap 07/13/2009, 12/01/2017   • Varicella 07/15/1999, 01/13/2000, 10/24/2008, 02/07/2018       Objective   Vitals:    12/07/22 1516   Temp: 97.8 °F (36.6 °C)   Weight: 66.2 kg (146 lb)   Height: 167.6 cm (65.98\")     Body mass " index is 23.58 kg/m².  Physical Exam  Vitals reviewed.   Constitutional:       Appearance: Normal appearance.   HENT:      Head: Normocephalic and atraumatic.   Cardiovascular:      Rate and Rhythm: Normal rate and regular rhythm.      Heart sounds: Normal heart sounds.   Neurological:      Mental Status: She is alert.       Assessment & Plan   Diagnoses and all orders for this visit:    1. Migraine without status migrainosus, not intractable, unspecified migraine type (Primary)  -     SUMAtriptan (Imitrex) 50 MG tablet; Take one tablet at onset of headache. May repeat dose one time in 2 hours if headache not relieved.  Dispense: 9 tablet; Refill: 0  -     topiramate (Topamax) 25 MG tablet; Take 1 tablet by mouth Every Night for 7 days, THEN 1 tablet 2 (Two) Times a Day for 7 days, THEN 2 tablets Every Night for 7 days.  Dispense: 35 tablet; Refill: 0  -     promethazine (PHENERGAN) 25 MG tablet; Take 1 tablet by mouth Every 6 (Six) Hours As Needed for Nausea or Vomiting.  Dispense: 30 tablet; Refill: 0    Start Topamax for migraine prevention. Will send in Sumatriptan for rescue and Phenergan.     Return in about 3 weeks (around 12/28/2022).

## 2022-12-09 ENCOUNTER — HOSPITAL ENCOUNTER (OUTPATIENT)
Dept: PHYSICAL THERAPY | Facility: HOSPITAL | Age: 22
Setting detail: THERAPIES SERIES
Discharge: HOME | End: 2022-12-09

## 2022-12-09 DIAGNOSIS — R53.1 WEAKNESS: ICD-10-CM

## 2022-12-09 DIAGNOSIS — M25.551 RIGHT HIP PAIN: Primary | ICD-10-CM

## 2022-12-09 PROCEDURE — 97110 THERAPEUTIC EXERCISES: CPT

## 2022-12-09 NOTE — THERAPY TREATMENT NOTE
Outpatient Physical Therapy Ortho Treatment Note  [facility]     Patient Name: rTan Quintanilla  : 2000  MRN: [id number]  Today's Date: 2022      Visit Date: 2022    Visit Dx:    ICD-10-CM ICD-9-CM   1. Right hip pain  M25.551 719.45   2. Weakness  R53.1 780.79       Patient Active Problem List   Diagnosis   • Migraine headache   • IUD (intrauterine device) in place   • Rectal bleeding   • H/O Clostridium difficile infection   • Abdominal pain   • Diarrhea   • Heartburn        Past Medical History:   Diagnosis Date   • Anesthesia     BLOOD PRESSURE DROPPED POST-OP WITH EAR TUBES AGE 4   • Anxiety    • Deviated septum    • History of Clostridium difficile colitis     NUMEROUS ANTIBIOTICS WITH STREP HX   • History of strep sore throat     7 TIMES IN    • Migraines    • Rectal bleeding         Past Surgical History:   Procedure Laterality Date   • COLONOSCOPY N/A 2022    Procedure: COLONOSCOPY TO CECUM AND TERMINAL ILEUM WITH BIOPSIES;  Surgeon: Kaleb Joyner MD;  Location: [facility] ENDOSCOPY;  Service: Gastroenterology;  Laterality: N/A;  PRE- BLOOD IN STOOL  POST- HEMORRHOIDS, ANAL FISSURE   • EAR TUBES     • ENDOSCOPY N/A 2022    Procedure: ESOPHAGOGASTRODUODENOSCOPY WITH BIOPSIES;  Surgeon: Kaleb Joyner MD;  Location: [facility] ENDOSCOPY;  Service: Gastroenterology;  Laterality: N/A;  PRE- ABDOMINAL PAIN  POST- MILD GASTRITIS   • SIGMOIDOSCOPY N/A 2022    Procedure: FLEXIBLE SIGMOIDOSCOPY WITH BIOPSIES;  Surgeon: Baron Ayoub MD;  Location: [facility] ENDOSCOPY;  Service: Gastroenterology;  Laterality: N/A;  Pre: rectal bleeding  Post: hemorrhoids   • TONSILLECTOMY Bilateral 2021    Procedure: TONSILLECTOMY;  Surgeon: Duc Alvarez MD;  Location: [facility] MAIN OR;  Service: ENT;  Laterality: Bilateral;   • WISDOM TOOTH EXTRACTION                          PT Assessment/Plan     Row Name 22 1500          PT Assessment    Assessment Comments Tran  "returns to clinic for first follow up from initial evaluation for R hip pain. She reports compliance wiith HEP daily, feels maybe slight reduction in \"popping\" in hip and generalaly fefels weakness in hip. Progressed hip girdle strenngtening working to transition to closed chain position as able. Tolerated current program and increased challenges with appropriate muscle fatigue and no complaints of pain in clinic. She will continue to benefit from skilled PT.  -        PT Plan    PT Plan Comments side steps, step up, stork?  -           User Key  (r) = Recorded By, (t) = Taken By, (c) = Cosigned By    Initials Name Provider Type     Raquel Rey, PT Physical Therapist                   OP Exercises     Row Name 12/09/22 1500             Subjective Comments    Subjective Comments I did the exercises every night, I was a little sore and maybe not popping my hip as much. It still pops though. I just feel like my hips are really weak.  -         Total Minutes    59722 - PT Therapeutic Exercise Minutes 40  -MH         Exercise 1    Exercise Name 1 RCB  -MH      Cueing 1 Verbal  -MH      Time 1 5 min  -MH         Exercise 2    Exercise Name 2 SLR  -MH      Cueing 2 Verbal;Tactile  -MH      Sets 2 2  -MH      Reps 2 10  -MH      Time 2 1 set neutral, 1 set ER  -MH         Exercise 3    Exercise Name 3 sl abd  -MH      Cueing 3 Verbal;Tactile  -MH      Sets 3 2  -MH      Reps 3 10  -MH         Exercise 4    Exercise Name 4 prone hip ext  -MH      Cueing 4 Verbal;Tactile  -MH      Sets 4 2  -MH      Reps 4 10  -MH         Exercise 5    Exercise Name 5 bridge + abd  -MH      Cueing 5 Verbal;Demo  -MH      Sets 5 2  -MH      Reps 5 10  -MH      Time 5 GTB  -MH         Exercise 6    Exercise Name 6 sideplanks  -MH      Cueing 6 Verbal;Demo  -MH      Reps 6 4  -MH      Time 6 15sec  -MH         Exercise 7    Exercise Name 7 monster walks - retro  -MH      Cueing 7 Verbal;Demo  -MH      Reps 7 3 laps 10'  -MH         "    User Key  (r) = Recorded By, (t) = Taken By, (c) = Cosigned By    Initials Name Provider Type    Raquel Smith, PT Physical Therapist                              PT OP Goals     Row Name 12/09/22 1500          PT Short Term Goals    STG Date to Achieve 01/04/23  -     STG 1 Patient will be independent with initial HEP for hip stability.  -     STG 1 Progress Ongoing  -     STG 2 Patient will demonstrate 5/5 B hip strength to improve capacity for functional mobility including working out and doing housework.  -     STG 2 Progress Ongoing  -     STG 3 Patient will report a 50% improvement in incidences of hip popping, locking up, and pain to demonstrate improved symptoms and stability.  -     STG 3 Progress Ongoing  -        Long Term Goals    LTG Date to Achieve 02/03/23  -     LTG 1 Patient will be independent with finalized HEP to maintain function and prevent return of symptoms.  -     LTG 1 Progress Ongoing  -     LTG 2 Patient will score 70/80 on the LEFS to demonstrate improved functional mobility and decreased limitation on daily activities from R hip pain and instability.  -     LTG 2 Progress Ongoing  -     LTG 3 Patient will report a 80% improvement in incidences of hip popping, locking up, and pain to demonstrate improved symptoms and stability.  -     LTG 3 Progress Ongoing  -           User Key  (r) = Recorded By, (t) = Taken By, (c) = Cosigned By    Initials Name Provider Type    Raquel Smith PT Physical Therapist                Therapy Education  Education Details: Updated HEP  Given: HEP  Program: Reinforced, Progressed  How Provided: Verbal, Demonstration, Written  Provided to: Patient  Level of Understanding: Verbalized              Time Calculation:   Start Time: 1510  Stop Time: 1550  Time Calculation (min): 40 min  Total Timed Code Minutes- PT: 40 minute(s)  Timed Charges  55175 - PT Therapeutic Exercise Minutes: 40  Total Minutes  Timed Charges Total  Minutes: 40   Total Minutes: 40  Therapy Charges for Today     Code Description Service Date Service Provider Modifiers Qty    39588519275 HC PT THER PROC EA 15 MIN 12/9/2022 Raquel Rey, PT GP 3                    Raquel Rey, PT  12/9/2022

## 2022-12-22 ENCOUNTER — OFFICE VISIT (OUTPATIENT)
Dept: INTERNAL MEDICINE | Facility: CLINIC | Age: 22
End: 2022-12-22

## 2022-12-22 VITALS — HEIGHT: 66 IN | WEIGHT: 146.6 LBS | BODY MASS INDEX: 23.56 KG/M2 | TEMPERATURE: 97.5 F

## 2022-12-22 DIAGNOSIS — J02.9 SORE THROAT: Primary | ICD-10-CM

## 2022-12-22 DIAGNOSIS — J32.9 SINUSITIS, UNSPECIFIED CHRONICITY, UNSPECIFIED LOCATION: ICD-10-CM

## 2022-12-22 LAB
EXPIRATION DATE: NORMAL
EXPIRATION DATE: NORMAL
FLUAV AG UPPER RESP QL IA.RAPID: NOT DETECTED
FLUBV AG UPPER RESP QL IA.RAPID: NOT DETECTED
INTERNAL CONTROL: NORMAL
INTERNAL CONTROL: NORMAL
Lab: NORMAL
Lab: NORMAL
S PYO AG THROAT QL: NEGATIVE
SARS-COV-2 AG UPPER RESP QL IA.RAPID: NOT DETECTED

## 2022-12-22 PROCEDURE — 87428 SARSCOV & INF VIR A&B AG IA: CPT | Performed by: NURSE PRACTITIONER

## 2022-12-22 PROCEDURE — 87880 STREP A ASSAY W/OPTIC: CPT | Performed by: NURSE PRACTITIONER

## 2022-12-22 PROCEDURE — 99213 OFFICE O/P EST LOW 20 MIN: CPT | Performed by: NURSE PRACTITIONER

## 2022-12-22 NOTE — PROGRESS NOTES
Subjective   Chief Complaint   Patient presents with   • Sore Throat       History of Present Illness   22 y.o. female presents with cc sore throat ongoing times 2 days; she is followed by PCP Ruby Rangel.     Denies fever, chills, body aches, HA or cough. Notes sinus congestion. Nose bleeding at times. No trouble eating or drinking. Tonsillectomy last year. No chest congestion, dyspnea or wheezing. No OTC meds.      Patient Active Problem List   Diagnosis   • Migraine headache   • IUD (intrauterine device) in place   • Rectal bleeding   • H/O Clostridium difficile infection   • Abdominal pain   • Diarrhea   • Heartburn       No Known Allergies    Current Outpatient Medications on File Prior to Visit   Medication Sig Dispense Refill   • acetaminophen (TYLENOL) 500 MG tablet Take 1,000 mg by mouth Every 6 (Six) Hours As Needed for Mild Pain.     • busPIRone (BUSPAR) 5 MG tablet Take 1 tablet by mouth 3 (Three) Times a Day. (Patient taking differently: Take 5 mg by mouth Daily As Needed.) 90 tablet 1   • clindamycin (CLEOCIN T) 1 % lotion Apply  topically to the appropriate area as directed Every Morning.     • dicyclomine (Bentyl) 10 MG capsule Take 1 capsule by mouth 4 (Four) Times a Day Before Meals & at Bedtime. (Patient taking differently: Take 10 mg by mouth 4 (Four) Times a Day As Needed.) 90 capsule 0   • Levonorgestrel (KYLEENA IU) by Intrauterine route.     • polycarbophil 625 MG tablet tablet Take  by mouth Daily.     • promethazine (PHENERGAN) 25 MG tablet Take 1 tablet by mouth Every 6 (Six) Hours As Needed for Nausea or Vomiting. 30 tablet 0   • spironolactone (ALDACTONE) 100 MG tablet Take 100 mg by mouth Every Night. For acne     • spironolactone (ALDACTONE) 100 MG tablet Take 1 tablet by mouth Daily. 30 tablet 6   • SUMAtriptan (Imitrex) 50 MG tablet Take one tablet at onset of headache. May repeat dose one time in 2 hours if headache not relieved. 9 tablet 0   • topiramate (Topamax) 25 MG tablet Take  1 tablet by mouth Every Night for 7 days, THEN 1 tablet 2 (Two) Times a Day for 7 days, THEN 2 tablets Every Night for 7 days. 35 tablet 0   • tretinoin (RETIN-A) 0.025 % cream Apply 1 application topically to the appropriate area as directed Every Night.       No current facility-administered medications on file prior to visit.       Past Medical History:   Diagnosis Date   • Anesthesia     BLOOD PRESSURE DROPPED POST-OP WITH EAR TUBES AGE 4   • Anxiety    • Deviated septum    • History of Clostridium difficile colitis     NUMEROUS ANTIBIOTICS WITH STREP HX   • History of strep sore throat     7 TIMES IN 2020   • Migraines    • Rectal bleeding        Family History   Problem Relation Age of Onset   • Diabetes Mother    • Cancer Father         Melanoma   • Hyperlipidemia Father    • Hypertension Father    • Epilepsy Father    • Migraines Paternal Aunt    • Diabetes Maternal Grandmother         CHF & Diabetes   • Cancer Paternal Grandfather         Melanoma   • Malig Hyperthermia Neg Hx    • Colon cancer Neg Hx    • Colon polyps Neg Hx    • Crohn's disease Neg Hx    • Irritable bowel syndrome Neg Hx    • Ulcerative colitis Neg Hx        Social History     Socioeconomic History   • Marital status:    Tobacco Use   • Smoking status: Never   • Smokeless tobacco: Never   • Tobacco comments:     vaping   Vaping Use   • Vaping Use: Former   Substance and Sexual Activity   • Alcohol use: Yes     Comment: Social   • Drug use: Never   • Sexual activity: Yes     Partners: Male     Birth control/protection: Condom, I.U.D., Same-sex partner       Past Surgical History:   Procedure Laterality Date   • COLONOSCOPY N/A 09/01/2022    Procedure: COLONOSCOPY TO CECUM AND TERMINAL ILEUM WITH BIOPSIES;  Surgeon: Kaleb Joyner MD;  Location: [facility] ENDOSCOPY;  Service: Gastroenterology;  Laterality: N/A;  PRE- BLOOD IN STOOL  POST- HEMORRHOIDS, ANAL FISSURE   • EAR TUBES     • ENDOSCOPY N/A 09/01/2022    Procedure:  ESOPHAGOGASTRODUODENOSCOPY WITH BIOPSIES;  Surgeon: Kaleb Joyner MD;  Location[facility] ENDOSCOPY;  Service: Gastroenterology;  Laterality: N/A;  PRE- ABDOMINAL PAIN  POST- MILD GASTRITIS   • SIGMOIDOSCOPY N/A 03/11/2022    Procedure: FLEXIBLE SIGMOIDOSCOPY WITH BIOPSIES;  Surgeon: Baron Ayoub MD;  Location:  [facility] ENDOSCOPY;  Service: Gastroenterology;  Laterality: N/A;  Pre: rectal bleeding  Post: hemorrhoids   • TONSILLECTOMY Bilateral 07/13/2021    Procedure: TONSILLECTOMY;  Surgeon: Duc Alvarez MD;  Location: [facility] MAIN OR;  Service: ENT;  Laterality: Bilateral;   • WISDOM TOOTH EXTRACTION         The following portions of the patient's history were reviewed and updated as appropriate: problem list, allergies, current medications, past medical history, past social history and past surgical history.    Review of Systems    Immunization History   Administered Date(s) Administered   • COVID-19 (PFIZER) PURPLE CAP 12/22/2020, 01/12/2021   • DTaP 1998, 1998, 01/21/1999, 01/13/2000, 07/02/2002   • Flu Vaccine Intradermal Quad 18-64YR 10/23/2007, 10/30/2015   • Flu Vaccine Split Quad 02/28/2017, 10/06/2017, 09/20/2018, 09/24/2019   • FluLaval/Fluzone >6mos 02/28/2017, 10/06/2017   • Fluzone Quad >6mos (Multi-dose) 10/24/2001   • HPV Quadrivalent 07/13/2009, 09/14/2009, 01/15/2010   • Hep A / Hep B 01/02/2018, 02/07/2018, 07/12/2018   • Hep A, 2 Dose 10/24/2008, 07/13/2009, 07/31/2009   • Hep B, Adolescent or Pediatric 1998, 1998, 01/21/1999   • Hib (HbOC) 1998, 1998, 01/21/1999, 01/13/2000   • Hib (PRP-OMP) 1998, 1998, 01/21/1999, 01/13/2000   • Hpv9 11/09/2017   • IPV 1998, 1998, 01/13/2000, 07/12/2002   • Influenza LAIV (Nasal) 10/24/2008   • Influenza Quad Vaccine (Inpatient) 10/24/2001   • MMR 10/11/1999, 07/12/2002   • Meningococcal MCV4P (Menactra) 09/14/2009, 11/09/2017   • OPV 1998, 1998, 01/13/2000   • PPD Test  "02/28/2017, 11/09/2017   • Pneumococcal Conjugate 13-Valent (PCV13) 11/22/2000   • Tdap 07/13/2009, 12/01/2017   • Varicella 07/15/1999, 01/13/2000, 10/24/2008, 02/07/2018       Objective   Vitals:    12/22/22 1507   Temp: 97.5 °F (36.4 °C)   TempSrc: Temporal   Weight: 66.5 kg (146 lb 9.6 oz)   Height: 167.6 cm (65.98\")     Body mass index is 23.68 kg/m².  Physical Exam  Vitals reviewed.   Constitutional:       Appearance: Normal appearance. She is well-developed.   HENT:      Head: Normocephalic and atraumatic.      Right Ear: Tympanic membrane, ear canal and external ear normal.      Left Ear: Tympanic membrane, ear canal and external ear normal.      Nose: Congestion present.      Mouth/Throat:      Mouth: Mucous membranes are moist.      Pharynx: Oropharynx is clear. No oropharyngeal exudate or posterior oropharyngeal erythema.      Comments: +PND.   Cardiovascular:      Rate and Rhythm: Normal rate and regular rhythm.      Heart sounds: Normal heart sounds, S1 normal and S2 normal.   Pulmonary:      Effort: Pulmonary effort is normal.      Breath sounds: Normal breath sounds.   Musculoskeletal:      Cervical back: Neck supple.   Lymphadenopathy:      Cervical: No cervical adenopathy.   Skin:     General: Skin is warm and dry.   Neurological:      Mental Status: She is alert.   Psychiatric:         Mood and Affect: Mood normal.         Behavior: Behavior normal.         Procedures    Assessment & Plan   Diagnoses and all orders for this visit:    1. Sore throat (Primary)  Comments:  Rapid strep, flu/COVID negative. Throat culture today. Chloroseptic and warm salt water gargles advised.   Orders:  -     POCT SARS-CoV-2 Antigen DONAVON + Flu  -     POCT rapid strep A  -     Beta Strep Culture, Throat - , Throat; Future    2. Sinusitis, unspecified chronicity, unspecified location  Comments:  Given recent nosebleeds advised Zyrtec and Ocean Spray in lieu of nasal steroid.       No follow-ups on file.           "

## 2022-12-25 LAB — S PYO THROAT QL CULT: NEGATIVE

## 2022-12-30 ENCOUNTER — OFFICE VISIT (OUTPATIENT)
Dept: INTERNAL MEDICINE | Facility: CLINIC | Age: 22
End: 2022-12-30
Payer: COMMERCIAL

## 2022-12-30 VITALS — SYSTOLIC BLOOD PRESSURE: 100 MMHG | DIASTOLIC BLOOD PRESSURE: 60 MMHG

## 2022-12-30 DIAGNOSIS — R10.84 GENERALIZED ABDOMINAL PAIN: ICD-10-CM

## 2022-12-30 DIAGNOSIS — G43.909 MIGRAINE WITHOUT STATUS MIGRAINOSUS, NOT INTRACTABLE, UNSPECIFIED MIGRAINE TYPE: ICD-10-CM

## 2022-12-30 DIAGNOSIS — J32.9 SINUSITIS, UNSPECIFIED CHRONICITY, UNSPECIFIED LOCATION: Primary | ICD-10-CM

## 2022-12-30 PROCEDURE — 99214 OFFICE O/P EST MOD 30 MIN: CPT | Performed by: PHYSICIAN ASSISTANT

## 2022-12-30 RX ORDER — CEFDINIR 300 MG/1
300 CAPSULE ORAL 2 TIMES DAILY
Qty: 14 CAPSULE | Refills: 0 | Status: SHIPPED | OUTPATIENT
Start: 2022-12-30 | End: 2023-01-07

## 2022-12-30 RX ORDER — TOPIRAMATE 50 MG/1
50 TABLET, FILM COATED ORAL 2 TIMES DAILY
Qty: 180 TABLET | Refills: 1 | Status: SHIPPED | OUTPATIENT
Start: 2022-12-30 | End: 2023-02-13

## 2022-12-30 RX ORDER — IBUPROFEN 600 MG/1
600 TABLET ORAL EVERY 8 HOURS PRN
Qty: 90 TABLET | Refills: 0 | Status: SHIPPED | OUTPATIENT
Start: 2022-12-30 | End: 2023-01-29

## 2022-12-30 NOTE — PROGRESS NOTES
Subjective   Chief Complaint   Patient presents with   • Migraine       History of Present Illness     Has not needed to take the Imitrex. Headaches are improving. Having nose bleeds up to 3-4 times per day. Can lasts for a minute at a time. Only coming out of her left nostril.      Has been taking allegra and zyrtec d after being sick for 10 days now. Had a negative flu, covid and strep.   Patient Active Problem List   Diagnosis   • Migraine headache   • IUD (intrauterine device) in place   • Rectal bleeding   • H/O Clostridium difficile infection   • Abdominal pain   • Diarrhea   • Heartburn       No Known Allergies    Current Outpatient Medications on File Prior to Visit   Medication Sig Dispense Refill   • acetaminophen (TYLENOL) 500 MG tablet Take 1,000 mg by mouth Every 6 (Six) Hours As Needed for Mild Pain.     • busPIRone (BUSPAR) 5 MG tablet Take 1 tablet by mouth 3 (Three) Times a Day. (Patient taking differently: Take 5 mg by mouth Daily As Needed.) 90 tablet 1   • clindamycin (CLEOCIN T) 1 % lotion Apply  topically to the appropriate area as directed Every Morning.     • dicyclomine (Bentyl) 10 MG capsule Take 1 capsule by mouth 4 (Four) Times a Day Before Meals & at Bedtime. (Patient taking differently: Take 10 mg by mouth 4 (Four) Times a Day As Needed.) 90 capsule 0   • Levonorgestrel (KYLEENA IU) by Intrauterine route.     • polycarbophil 625 MG tablet tablet Take  by mouth Daily.     • promethazine (PHENERGAN) 25 MG tablet Take 1 tablet by mouth Every 6 (Six) Hours As Needed for Nausea or Vomiting. 30 tablet 0   • spironolactone (ALDACTONE) 100 MG tablet Take 100 mg by mouth Every Night. For acne     • spironolactone (ALDACTONE) 100 MG tablet Take 1 tablet by mouth Daily. 30 tablet 6   • SUMAtriptan (Imitrex) 50 MG tablet Take one tablet at onset of headache. May repeat dose one time in 2 hours if headache not relieved. 9 tablet 0   • tretinoin (RETIN-A) 0.025 % cream Apply 1 application topically  to the appropriate area as directed Every Night.     • [DISCONTINUED] topiramate (Topamax) 25 MG tablet Take 1 tablet by mouth Every Night for 7 days, THEN 1 tablet 2 (Two) Times a Day for 7 days, THEN 2 tablets Every Night for 7 days. 35 tablet 0     No current facility-administered medications on file prior to visit.       Past Medical History:   Diagnosis Date   • Anesthesia     BLOOD PRESSURE DROPPED POST-OP WITH EAR TUBES AGE 4   • Anxiety    • Deviated septum    • History of Clostridium difficile colitis     NUMEROUS ANTIBIOTICS WITH STREP HX   • History of strep sore throat     7 TIMES IN 2020   • Migraines    • Rectal bleeding        Family History   Problem Relation Age of Onset   • Diabetes Mother    • Cancer Father         Melanoma   • Hyperlipidemia Father    • Hypertension Father    • Epilepsy Father    • Migraines Paternal Aunt    • Diabetes Maternal Grandmother         CHF & Diabetes   • Cancer Paternal Grandfather         Melanoma   • Malig Hyperthermia Neg Hx    • Colon cancer Neg Hx    • Colon polyps Neg Hx    • Crohn's disease Neg Hx    • Irritable bowel syndrome Neg Hx    • Ulcerative colitis Neg Hx        Social History     Socioeconomic History   • Marital status:    Tobacco Use   • Smoking status: Never   • Smokeless tobacco: Never   • Tobacco comments:     vaping   Vaping Use   • Vaping Use: Former   Substance and Sexual Activity   • Alcohol use: Yes     Comment: Social   • Drug use: Never   • Sexual activity: Yes     Partners: Male     Birth control/protection: Condom, I.U.D., Same-sex partner       Past Surgical History:   Procedure Laterality Date   • COLONOSCOPY N/A 09/01/2022    Procedure: COLONOSCOPY TO CECUM AND TERMINAL ILEUM WITH BIOPSIES;  Surgeon: Kaleb Joyner MD;  Location: [facility] ENDOSCOPY;  Service: Gastroenterology;  Laterality: N/A;  PRE- BLOOD IN STOOL  POST- HEMORRHOIDS, ANAL FISSURE   • EAR TUBES     • ENDOSCOPY N/A 09/01/2022    Procedure:  ESOPHAGOGASTRODUODENOSCOPY WITH BIOPSIES;  Surgeon: Kaleb Joyner MD;  Location[facility] ENDOSCOPY;  Service: Gastroenterology;  Laterality: N/A;  PRE- ABDOMINAL PAIN  POST- MILD GASTRITIS   • SIGMOIDOSCOPY N/A 03/11/2022    Procedure: FLEXIBLE SIGMOIDOSCOPY WITH BIOPSIES;  Surgeon: Baron Ayoub MD;  Location:  [facility] ENDOSCOPY;  Service: Gastroenterology;  Laterality: N/A;  Pre: rectal bleeding  Post: hemorrhoids   • TONSILLECTOMY Bilateral 07/13/2021    Procedure: TONSILLECTOMY;  Surgeon: Duc Alvarez MD;  Location: [facility] MAIN OR;  Service: ENT;  Laterality: Bilateral;   • WISDOM TOOTH EXTRACTION           The following portions of the patient's history were reviewed and updated as appropriate: problem list, allergies, current medications, past medical history, past family history, past social history and past surgical history.    ROS    Immunization History   Administered Date(s) Administered   • COVID-19 (PFIZER) PURPLE CAP 12/22/2020, 01/12/2021   • DTaP 2000, 1998, 01/21/1999, 01/13/2000, 07/02/2002   • Flu Vaccine Intradermal Quad 18-64YR 10/23/2007, 10/30/2015   • Flu Vaccine Split Quad 02/28/2017, 10/06/2017, 09/20/2018, 09/24/2019   • FluLaval/Fluzone >6mos 02/28/2017, 10/06/2017   • Fluzone Quad >6mos (Multi-dose) 10/24/2001   • HPV Quadrivalent 07/13/2009, 09/14/2009, 01/15/2010   • Hep A / Hep B 01/02/2018, 02/07/2018, 07/12/2018   • Hep A, 2 Dose 10/24/2008, 07/13/2009, 07/31/2009   • Hep B, Adolescent or Pediatric 1998, 1998, 01/21/1999   • Hib (HbOC) 1998, 1998, 01/21/1999, 01/13/2000   • Hib (PRP-OMP) 1998, 1998, 01/21/1999, 01/13/2000   • Hpv9 11/09/2017   • IPV 1998, 1998, 01/13/2000, 07/12/2002   • Influenza LAIV (Nasal) 10/24/2008   • Influenza Quad Vaccine (Inpatient) 10/24/2001   • MMR 10/11/1999, 07/12/2002   • Meningococcal MCV4P (Menactra) 09/14/2009, 11/09/2017   • OPV 1998, 1998, 01/13/2000   • PPD  Test 02/28/2017, 11/09/2017   • Pneumococcal Conjugate 13-Valent (PCV13) 11/22/2000   • Tdap 07/13/2009, 12/01/2017   • Varicella 07/15/1999, 01/13/2000, 10/24/2008, 02/07/2018       Objective   Vitals:    12/30/22 1536   BP: 100/60     There is no height or weight on file to calculate BMI.  Physical Exam  Vitals reviewed.   Constitutional:       Appearance: Normal appearance.   HENT:      Head: Normocephalic and atraumatic.      Right Ear: Ear canal normal.      Left Ear: Ear canal normal.      Nose:      Right Turbinates: Not enlarged, swollen or pale.      Left Turbinates: Not enlarged, swollen or pale.      Mouth/Throat:      Pharynx: Posterior oropharyngeal erythema present.   Cardiovascular:      Rate and Rhythm: Normal rate and regular rhythm.      Heart sounds: Normal heart sounds.   Pulmonary:      Effort: Pulmonary effort is normal.      Breath sounds: Normal breath sounds.   Neurological:      Mental Status: She is alert.         Assessment & Plan   Diagnoses and all orders for this visit:    1. Sinusitis, unspecified chronicity, unspecified location (Primary)  -     cefdinir (OMNICEF) 300 MG capsule; Take 1 capsule by mouth 2 (Two) Times a Day for 7 days.  Dispense: 14 capsule; Refill: 0    2. Migraine without status migrainosus, not intractable, unspecified migraine type  -     topiramate (Topamax) 50 MG tablet; Take 1 tablet by mouth 2 (Two) Times a Day for 90 days.  Dispense: 180 tablet; Refill: 1    Increase Topamax, discussed stopping nasal spray and using saline mist for nose bleeds currently. Start Cefdinir for URI    Return in about 3 months (around 3/30/2023).

## 2023-01-06 ENCOUNTER — HOSPITAL ENCOUNTER (OUTPATIENT)
Dept: PHYSICAL THERAPY | Facility: HOSPITAL | Age: 23
Setting detail: THERAPIES SERIES
Discharge: HOME | End: 2023-01-06
Payer: COMMERCIAL

## 2023-01-06 DIAGNOSIS — R53.1 WEAKNESS: ICD-10-CM

## 2023-01-06 DIAGNOSIS — M25.551 RIGHT HIP PAIN: Primary | ICD-10-CM

## 2023-01-06 PROCEDURE — 97110 THERAPEUTIC EXERCISES: CPT

## 2023-01-06 NOTE — THERAPY PROGRESS REPORT/RE-CERT
Outpatient Physical Therapy Ortho Progress Note  [facility]     Patient Name: Tran Quintanilla  : 2000  MRN: [id number]  Today's Date: 2023      Visit Date: 2023    Visit Dx:    ICD-10-CM ICD-9-CM   1. Right hip pain  M25.551 719.45   2. Weakness  R53.1 780.79       Patient Active Problem List   Diagnosis   • Migraine headache   • IUD (intrauterine device) in place   • Rectal bleeding   • H/O Clostridium difficile infection   • Abdominal pain   • Diarrhea   • Heartburn        Past Medical History:   Diagnosis Date   • Anesthesia     BLOOD PRESSURE DROPPED POST-OP WITH EAR TUBES AGE 4   • Anxiety    • Deviated septum    • History of Clostridium difficile colitis     NUMEROUS ANTIBIOTICS WITH STREP HX   • History of strep sore throat     7 TIMES IN    • Migraines    • Rectal bleeding         Past Surgical History:   Procedure Laterality Date   • COLONOSCOPY N/A 2022    Procedure: COLONOSCOPY TO CECUM AND TERMINAL ILEUM WITH BIOPSIES;  Surgeon: Kaleb Joyner MD;  Location: [facility] ENDOSCOPY;  Service: Gastroenterology;  Laterality: N/A;  PRE- BLOOD IN STOOL  POST- HEMORRHOIDS, ANAL FISSURE   • EAR TUBES     • ENDOSCOPY N/A 2022    Procedure: ESOPHAGOGASTRODUODENOSCOPY WITH BIOPSIES;  Surgeon: Kaleb Joyner MD;  Location: [facility] ENDOSCOPY;  Service: Gastroenterology;  Laterality: N/A;  PRE- ABDOMINAL PAIN  POST- MILD GASTRITIS   • SIGMOIDOSCOPY N/A 2022    Procedure: FLEXIBLE SIGMOIDOSCOPY WITH BIOPSIES;  Surgeon: Baron Ayoub MD;  Location: [facility] ENDOSCOPY;  Service: Gastroenterology;  Laterality: N/A;  Pre: rectal bleeding  Post: hemorrhoids   • TONSILLECTOMY Bilateral 2021    Procedure: TONSILLECTOMY;  Surgeon: Duc Alvarez MD;  Location: [facility] MAIN OR;  Service: ENT;  Laterality: Bilateral;   • WISDOM TOOTH EXTRACTION                          PT Assessment/Plan     Row Name 23 1500          PT Assessment    Functional Limitations  Limitation in home management;Limitations in functional capacity and performance;Performance in leisure activities;Performance in self-care ADL;Performance in sport activities  -     Impairments Pain;Muscle strength;Poor body mechanics  -     Assessment Comments Tran Quintanilla has been seen for 3 physical therapy sessions for R hip pain. Treatment has included therapeutic exercise and patient education with home exercise program . Progress to physical therapy goals is fair as pt. Has met 1/3 STGs, and 0/3 LTGs though progressing toward all goals. She reports overall reduction in frequnecy of popping and feeling of R hip \"locking up\" as well as overall reduced intensity of pain. She continues to have discomfort when laying in bed and will have instances of painful popping in hip but less noticeable during work day. She verbalizes up to 40% improvement in overall condition despite limited visits thus far secondary to clinic schedule. She is compliant with HEP and tolerating higher level challenges with emphasis on strength and stability vs. Stretching. She will benefit from continued skilled physical therapy to address remaining impairments and functional limitations.  -     Please refer to paper survey for additional self-reported information No  -     Rehab Potential Good  -     Patient/caregiver participated in establishment of treatment plan and goals Yes  -     Patient would benefit from skilled therapy intervention Yes  -        PT Plan    PT Frequency 1x/week;2x/week  -     Predicted Duration of Therapy Intervention (PT) 10 visits  -     Planned CPT's? PT RE-EVAL: 91564;PT THER PROC EA 15 MIN: 12737;PT THER ACT EA 15 MIN: 77436;PT MANUAL THERAPY EA 15 MIN: 19349;PT NEUROMUSC RE-EDUCATION EA 15 MIN: 88519;PT GAIT TRAINING EA 15 MIN: 70799;PT SELF CARE/HOME MGMT/TRAIN EA 15: 52511;PT HOT OR COLD PACK TREAT MCARE  -     PT Plan Comments goblet squat, SL bridge?  -           User Key  (r) =  Recorded By, (t) = Taken By, (c) = Cosigned By    Initials Name Provider Type     Raquel Rey, PT Physical Therapist                   OP Exercises     Row Name 01/06/23 1500             Subjective Comments    Subjective Comments I think its doing better, I sitll feel it buts more discomfort now then pain and less popping and does nto feel llike it is going ot lock up as much.  -MH         Total Minutes    46218 - PT Therapeutic Exercise Minutes 44  -MH         Exercise 1    Exercise Name 1 RCB  -MH      Cueing 1 Verbal  -MH      Time 1 5 min  -MH         Exercise 2    Exercise Name 2 SLR  -MH      Additional Comments HEP  -MH         Exercise 3    Exercise Name 3 sl abd  -MH      Additional Comments HEP  -MH         Exercise 5    Exercise Name 5 bridge + abd  -MH      Cueing 5 Verbal;Demo  -MH      Sets 5 2  -MH      Reps 5 10  -MH      Time 5 GTB  -MH         Exercise 6    Exercise Name 6 sideplanks  -MH      Cueing 6 Verbal;Demo  -MH      Reps 6 4  -MH      Time 6 15sec  -MH      Additional Comments bottom leg bent, top leg straight  -MH         Exercise 7    Exercise Name 7 monster walks - retro  -MH      Cueing 7 Verbal;Demo  -MH      Reps 7 3 laps 10'  -MH         Exercise 8    Exercise Name 8 standing clamshell  -MH      Cueing 8 Verbal;Demo  -MH      Sets 8 2  -MH      Reps 8 10ea  -MH      Time 8 GTB  -MH         Exercise 9    Exercise Name 9 reverse lunge  -MH      Cueing 9 Verbal;Demo  -MH      Reps 9 10ea  -MH      Time 9 5#  -MH         Exercise 10    Exercise Name 10 lateral step up  -MH      Cueing 10 Verbal;Demo  -MH      Reps 10 10ea  -MH      Time 10 6\"  -MH            User Key  (r) = Recorded By, (t) = Taken By, (c) = Cosigned By    Initials Name Provider Type     Raquel Rey, PT Physical Therapist                              PT OP Goals     Row Name 01/06/23 1500          PT Short Term Goals    STG Date to Achieve 01/04/23  -MH     STG 1 Patient will be independent with initial HEP  for hip stability.  -     STG 1 Progress Met  -     STG 1 Progress Comments reports compliance with HEP  -     STG 2 Patient will demonstrate 5/5 B hip strength to improve capacity for functional mobility including working out and doing housework.  -     STG 2 Progress Ongoing  -     STG 3 Patient will report a 50% improvement in incidences of hip popping, locking up, and pain to demonstrate improved symptoms and stability.  -     STG 3 Progress Ongoing;Progressing  -     STG 3 Progress Comments 40%  -        Long Term Goals    LTG Date to Achieve 02/03/23  -     LTG 1 Patient will be independent with finalized HEP to maintain function and prevent return of symptoms.  -     LTG 1 Progress Ongoing  -     LTG 1 Progress Comments limited visits thus far  -     LTG 2 Patient will score 70/80 on the LEFS to demonstrate improved functional mobility and decreased limitation on daily activities from R hip pain and instability.  -     LTG 2 Progress Ongoing  -     LTG 3 Patient will report a 80% improvement in incidences of hip popping, locking up, and pain to demonstrate improved symptoms and stability.  -     LTG 3 Progress Ongoing  -           User Key  (r) = Recorded By, (t) = Taken By, (c) = Cosigned By    Initials Name Provider Type    Raquel Smith PT Physical Therapist                Therapy Education  Education Details: Updated HEP  Given: HEP  Program: Reinforced, Progressed  How Provided: Verbal, Demonstration, Written  Provided to: Patient  Level of Understanding: Verbalized              Time Calculation:   Start Time: 1531  Stop Time: 1615  Time Calculation (min): 44 min  Total Timed Code Minutes- PT: 44 minute(s)  Timed Charges  50068 - PT Therapeutic Exercise Minutes: 44  Total Minutes  Timed Charges Total Minutes: 44   Total Minutes: 44  Therapy Charges for Today     Code Description Service Date Service Provider Modifiers Qty    [id number]  PT THER PROC EA 15 MIN  1/6/2023 Raquel Rey, PT GP 3                    Raquel Rey, PT  1/6/2023

## 2023-01-09 RX ORDER — FLUCONAZOLE 150 MG/1
150 TABLET ORAL ONCE
Qty: 1 TABLET | Refills: 0 | Status: SHIPPED | OUTPATIENT
Start: 2023-01-09 | End: 2023-01-11

## 2023-01-10 ENCOUNTER — HOSPITAL ENCOUNTER (OUTPATIENT)
Dept: PHYSICAL THERAPY | Facility: HOSPITAL | Age: 23
Setting detail: THERAPIES SERIES
Discharge: HOME | End: 2023-01-10
Payer: COMMERCIAL

## 2023-01-10 DIAGNOSIS — M25.551 RIGHT HIP PAIN: Primary | ICD-10-CM

## 2023-01-10 DIAGNOSIS — R53.1 WEAKNESS: ICD-10-CM

## 2023-01-10 PROCEDURE — 97110 THERAPEUTIC EXERCISES: CPT

## 2023-01-10 NOTE — THERAPY TREATMENT NOTE
Outpatient Physical Therapy Ortho Treatment Note  [facility]     Patient Name: Tran Quintanilla  : 2000  MRN: [id number]  Today's Date: 1/10/2023      Visit Date: 01/10/2023    Visit Dx:    ICD-10-CM ICD-9-CM   1. Right hip pain  M25.551 719.45   2. Weakness  R53.1 780.79       Patient Active Problem List   Diagnosis   • Migraine headache   • IUD (intrauterine device) in place   • Rectal bleeding   • H/O Clostridium difficile infection   • Abdominal pain   • Diarrhea   • Heartburn        Past Medical History:   Diagnosis Date   • Anesthesia     BLOOD PRESSURE DROPPED POST-OP WITH EAR TUBES AGE 4   • Anxiety    • Deviated septum    • History of Clostridium difficile colitis     NUMEROUS ANTIBIOTICS WITH STREP HX   • History of strep sore throat     7 TIMES IN    • Migraines    • Rectal bleeding         Past Surgical History:   Procedure Laterality Date   • COLONOSCOPY N/A 2022    Procedure: COLONOSCOPY TO CECUM AND TERMINAL ILEUM WITH BIOPSIES;  Surgeon: Kaleb Joyner MD;  Location: [facility] ENDOSCOPY;  Service: Gastroenterology;  Laterality: N/A;  PRE- BLOOD IN STOOL  POST- HEMORRHOIDS, ANAL FISSURE   • EAR TUBES     • ENDOSCOPY N/A 2022    Procedure: ESOPHAGOGASTRODUODENOSCOPY WITH BIOPSIES;  Surgeon: Kaleb Joyner MD;  Location: [facility] ENDOSCOPY;  Service: Gastroenterology;  Laterality: N/A;  PRE- ABDOMINAL PAIN  POST- MILD GASTRITIS   • SIGMOIDOSCOPY N/A 2022    Procedure: FLEXIBLE SIGMOIDOSCOPY WITH BIOPSIES;  Surgeon: Baron Ayoub MD;  Location: [facility] ENDOSCOPY;  Service: Gastroenterology;  Laterality: N/A;  Pre: rectal bleeding  Post: hemorrhoids   • TONSILLECTOMY Bilateral 2021    Procedure: TONSILLECTOMY;  Surgeon: Duc Alvarez MD;  Location: [facility] MAIN OR;  Service: ENT;  Laterality: Bilateral;   • WISDOM TOOTH EXTRACTION                          PT Assessment/Plan     Row Name 01/10/23 1600          PT Assessment    Assessment Comments Tran  "returns to clinic with no new complaints, overall continues to verbalize improvements since beginning PT. She does report continued \"popping\" but is less frequent and less painful then previously. Focused on multi-planar strengthening of hip with added lateral lunge and increased repetitions reverse lunge, monster walk, and lateral step up. Benefits from verbal cueing with lunge for proper knee alignment but otherwise tolerating progressions well. Pt. will continue to benefit from skilled PT.  -        PT Plan    PT Plan Comments transverse lunge or step up? SL bridge?  -           User Key  (r) = Recorded By, (t) = Taken By, (c) = Cosigned By    Initials Name Provider Type     Raquel Rey, PT Physical Therapist                   OP Exercises     Row Name 01/10/23 1600             Subjective Comments    Subjective Comments A little popping today, nothing out of the ordinary  -         Total Minutes    27663 - PT Therapeutic Exercise Minutes 36  -MH         Exercise 1    Exercise Name 1 RCB  -MH      Cueing 1 Verbal  -      Time 1 5 min  -         Exercise 7    Exercise Name 7 monster walks - retro  -      Cueing 7 Verbal;Demo  -MH      Reps 7 4 laps 10'  -MH         Exercise 8    Exercise Name 8 standing clamshell  -      Cueing 8 Verbal;Demo  -      Sets 8 2  -MH      Reps 8 10ea  -MH      Time 8 GTB  -         Exercise 9    Exercise Name 9 reverse lunge  -      Cueing 9 Verbal;Demo  -MH      Sets 9 2  -MH      Reps 9 10ea  -MH      Time 9 5#  -MH         Exercise 10    Exercise Name 10 lateral step up  -      Cueing 10 Verbal;Demo  -MH      Sets 10 2  -MH      Reps 10 10ea  -MH      Time 10 6\"  -      Additional Comments w/   -MH         Exercise 11    Exercise Name 11 lateral lunges  -MH      Cueing 11 Verbal;Demo  -MH      Reps 11 10ea  -MH      Time 11 alternating  -MH      Additional Comments 5# DB  -            User Key  (r) = Recorded By, (t) = Taken By, (c) = Cosigned " By    Initials Name Provider Type    Raquel Smith, PT Physical Therapist                              PT OP Goals     Row Name 01/10/23 1600          PT Short Term Goals    STG Date to Achieve 01/04/23  -     STG 1 Patient will be independent with initial HEP for hip stability.  -     STG 1 Progress Met  -     STG 2 Patient will demonstrate 5/5 B hip strength to improve capacity for functional mobility including working out and doing housework.  -     STG 2 Progress Ongoing  -     STG 3 Patient will report a 50% improvement in incidences of hip popping, locking up, and pain to demonstrate improved symptoms and stability.  -     STG 3 Progress Ongoing;Progressing  -        Long Term Goals    LTG Date to Achieve 02/03/23  -     LTG 1 Patient will be independent with finalized HEP to maintain function and prevent return of symptoms.  -     LTG 1 Progress Ongoing  -     LTG 2 Patient will score 70/80 on the LEFS to demonstrate improved functional mobility and decreased limitation on daily activities from R hip pain and instability.  -     LTG 2 Progress Ongoing  -     LTG 3 Patient will report a 80% improvement in incidences of hip popping, locking up, and pain to demonstrate improved symptoms and stability.  -     LTG 3 Progress Ongoing  -           User Key  (r) = Recorded By, (t) = Taken By, (c) = Cosigned By    Initials Name Provider Type    Raquel Smith PT Physical Therapist                Therapy Education  Education Details: Updated HEP  Given: HEP  Program: Reinforced, Progressed  How Provided: Verbal, Demonstration, Written  Provided to: Patient  Level of Understanding: Verbalized              Time Calculation:   Start Time: 1608  Stop Time: 1645  Time Calculation (min): 37 min  Total Timed Code Minutes- PT: 36 minute(s)  Timed Charges  03187 - PT Therapeutic Exercise Minutes: 36  Total Minutes  Timed Charges Total Minutes: 36   Total Minutes: 36  Therapy Charges for  Today     Code Description Service Date Service Provider Modifiers Qty    [id number] HC PT THER PROC EA 15 MIN 1/10/2023 Raquel Rey, PT  2                    Raquel Rey, PT  1/10/2023

## 2023-01-13 ENCOUNTER — HOSPITAL ENCOUNTER (OUTPATIENT)
Dept: PHYSICAL THERAPY | Facility: HOSPITAL | Age: 23
Setting detail: THERAPIES SERIES
Discharge: HOME | End: 2023-01-13
Payer: COMMERCIAL

## 2023-01-13 DIAGNOSIS — R53.1 WEAKNESS: ICD-10-CM

## 2023-01-13 DIAGNOSIS — M25.551 RIGHT HIP PAIN: Primary | ICD-10-CM

## 2023-01-13 PROCEDURE — 97110 THERAPEUTIC EXERCISES: CPT

## 2023-01-13 NOTE — THERAPY TREATMENT NOTE
Outpatient Physical Therapy Ortho Treatment Note  [facility]     Patient Name: Tran Quintanilla  : 2000  MRN: [id number]  Today's Date: 2023      Visit Date: 2023    Visit Dx:    ICD-10-CM ICD-9-CM   1. Right hip pain  M25.551 719.45   2. Weakness  R53.1 780.79       Patient Active Problem List   Diagnosis   • Migraine headache   • IUD (intrauterine device) in place   • Rectal bleeding   • H/O Clostridium difficile infection   • Abdominal pain   • Diarrhea   • Heartburn        Past Medical History:   Diagnosis Date   • Anesthesia     BLOOD PRESSURE DROPPED POST-OP WITH EAR TUBES AGE 4   • Anxiety    • Deviated septum    • History of Clostridium difficile colitis     NUMEROUS ANTIBIOTICS WITH STREP HX   • History of strep sore throat     7 TIMES IN    • Migraines    • Rectal bleeding         Past Surgical History:   Procedure Laterality Date   • COLONOSCOPY N/A 2022    Procedure: COLONOSCOPY TO CECUM AND TERMINAL ILEUM WITH BIOPSIES;  Surgeon: Kaleb Joyner MD;  Location: [facility] ENDOSCOPY;  Service: Gastroenterology;  Laterality: N/A;  PRE- BLOOD IN STOOL  POST- HEMORRHOIDS, ANAL FISSURE   • EAR TUBES     • ENDOSCOPY N/A 2022    Procedure: ESOPHAGOGASTRODUODENOSCOPY WITH BIOPSIES;  Surgeon: Kaleb Joyner MD;  Location: [facility] ENDOSCOPY;  Service: Gastroenterology;  Laterality: N/A;  PRE- ABDOMINAL PAIN  POST- MILD GASTRITIS   • SIGMOIDOSCOPY N/A 2022    Procedure: FLEXIBLE SIGMOIDOSCOPY WITH BIOPSIES;  Surgeon: Baron Ayoub MD;  Location: [facility] ENDOSCOPY;  Service: Gastroenterology;  Laterality: N/A;  Pre: rectal bleeding  Post: hemorrhoids   • TONSILLECTOMY Bilateral 2021    Procedure: TONSILLECTOMY;  Surgeon: Duc Alvarez MD;  Location: [facility] MAIN OR;  Service: ENT;  Laterality: Bilateral;   • WISDOM TOOTH EXTRACTION                          PT Assessment/Plan     Row Name 23 1600          PT Assessment    Assessment Comments Ms. Quintanilla  "arrives to PT expresses continued muscle soreness following last session in B hips/glutes. She reports consistent overall reduction in popping/clicking sensation of hip leading to general improvement in functional activity tolerance. Continued focus on strengthening within lateral plane of motion without pain. She tolerated addition of transverse step up with knee  and benefits from cues to increase glute activation. Ms. Quintanilla remains a candidate for skilled PT.  -OLIVER        PT Plan    PT Plan Comments consider transverse lunge, SLS  -OLIVER           User Key  (r) = Recorded By, (t) = Taken By, (c) = Cosigned By    Initials Name Provider Type    Tory Messer, PT Physical Therapist                   OP Exercises     Row Name 01/13/23 1500             Subjective Comments    Subjective Comments I was pretty sore for the last two days but I am overall feeling better now  -OLIVER         Total Minutes    18483 - PT Therapeutic Exercise Minutes 42  -OLIVER         Exercise 1    Exercise Name 1 RCB  -OLIVER      Cueing 1 Verbal  -OLIVER      Time 1 5 min  -OLIVER         Exercise 5    Exercise Name 5 bridge + abd  -OLIVER      Cueing 5 Verbal;Demo  -OLIVER      Sets 5 2  -OLIVER      Reps 5 10  -OLIVER      Time 5 GTB  -OLIVER         Exercise 7    Exercise Name 7 monster walks - retro  -OLIVER      Cueing 7 Verbal;Demo  -OLIVER      Reps 7 4 laps 10'  -OLIVER         Exercise 9    Exercise Name 9 reverse lunge  -OLIVER      Cueing 9 Verbal;Demo  -OLIVER      Sets 9 2  -OLIVER      Reps 9 10ea  -OLIVER      Time 9 5#  -OLIVER         Exercise 10    Exercise Name 10 lateral/transverse step up  -OLIVER      Cueing 10 Verbal;Demo  -OLIVER      Sets 10 2  -OLIVER      Reps 10 10ea  -OLIVER      Time 10 6\"  -OLIVER      Additional Comments w/   -OLIVER            User Key  (r) = Recorded By, (t) = Taken By, (c) = Cosigned By    Initials Name Provider Type    Tory Messer, PT Physical Therapist                              PT OP Goals     Row Name 01/13/23 1500          PT Short Term " Goals    STG Date to Achieve 01/04/23  -     STG 1 Patient will be independent with initial HEP for hip stability.  -     STG 1 Progress Met  -     STG 2 Patient will demonstrate 5/5 B hip strength to improve capacity for functional mobility including working out and doing housework.  -     STG 2 Progress Ongoing  -     STG 3 Patient will report a 50% improvement in incidences of hip popping, locking up, and pain to demonstrate improved symptoms and stability.  -     STG 3 Progress Ongoing;Progressing  -        Long Term Goals    LTG Date to Achieve 02/03/23  -     LTG 1 Patient will be independent with finalized HEP to maintain function and prevent return of symptoms.  -     LTG 1 Progress Ongoing  -     LTG 2 Patient will score 70/80 on the LEFS to demonstrate improved functional mobility and decreased limitation on daily activities from R hip pain and instability.  -     LTG 2 Progress Ongoing  -     LTG 3 Patient will report a 80% improvement in incidences of hip popping, locking up, and pain to demonstrate improved symptoms and stability.  -     LTG 3 Progress Ongoing  -           User Key  (r) = Recorded By, (t) = Taken By, (c) = Cosigned By    Initials Name Provider Type    Tory Messer, PT Physical Therapist                               Time Calculation:   Start Time: 1528  Stop Time: 1610  Time Calculation (min): 42 min  Timed Charges  05807 - PT Therapeutic Exercise Minutes: 42  Total Minutes  Timed Charges Total Minutes: 42   Total Minutes: 42  Therapy Charges for Today     Code Description Service Date Service Provider Modifiers Qty    [id number]  PT THER PROC EA 15 MIN 1/13/2023 Tory Carlos, PT GP 3                    Tory Carlos, PT  1/13/2023

## 2023-01-17 ENCOUNTER — HOSPITAL ENCOUNTER (OUTPATIENT)
Dept: PHYSICAL THERAPY | Facility: HOSPITAL | Age: 23
Setting detail: THERAPIES SERIES
Discharge: HOME | End: 2023-01-17
Payer: COMMERCIAL

## 2023-01-17 DIAGNOSIS — R53.1 WEAKNESS: ICD-10-CM

## 2023-01-17 DIAGNOSIS — M25.551 RIGHT HIP PAIN: Primary | ICD-10-CM

## 2023-01-17 PROCEDURE — 97110 THERAPEUTIC EXERCISES: CPT

## 2023-01-17 NOTE — THERAPY TREATMENT NOTE
Outpatient Physical Therapy Ortho Treatment Note  [facility]     Patient Name: Tran Quintanilla  : 2000  MRN: [id number]  Today's Date: 2023      Visit Date: 2023    Visit Dx:    ICD-10-CM ICD-9-CM   1. Right hip pain  M25.551 719.45   2. Weakness  R53.1 780.79       Patient Active Problem List   Diagnosis   • Migraine headache   • IUD (intrauterine device) in place   • Rectal bleeding   • H/O Clostridium difficile infection   • Abdominal pain   • Diarrhea   • Heartburn        Past Medical History:   Diagnosis Date   • Anesthesia     BLOOD PRESSURE DROPPED POST-OP WITH EAR TUBES AGE 4   • Anxiety    • Deviated septum    • History of Clostridium difficile colitis     NUMEROUS ANTIBIOTICS WITH STREP HX   • History of strep sore throat     7 TIMES IN    • Migraines    • Rectal bleeding         Past Surgical History:   Procedure Laterality Date   • COLONOSCOPY N/A 2022    Procedure: COLONOSCOPY TO CECUM AND TERMINAL ILEUM WITH BIOPSIES;  Surgeon: Kaleb Joyner MD;  Location: [facility] ENDOSCOPY;  Service: Gastroenterology;  Laterality: N/A;  PRE- BLOOD IN STOOL  POST- HEMORRHOIDS, ANAL FISSURE   • EAR TUBES     • ENDOSCOPY N/A 2022    Procedure: ESOPHAGOGASTRODUODENOSCOPY WITH BIOPSIES;  Surgeon: Kaleb Joyner MD;  Location: [facility] ENDOSCOPY;  Service: Gastroenterology;  Laterality: N/A;  PRE- ABDOMINAL PAIN  POST- MILD GASTRITIS   • SIGMOIDOSCOPY N/A 2022    Procedure: FLEXIBLE SIGMOIDOSCOPY WITH BIOPSIES;  Surgeon: Baron Ayoub MD;  Location: [facility] ENDOSCOPY;  Service: Gastroenterology;  Laterality: N/A;  Pre: rectal bleeding  Post: hemorrhoids   • TONSILLECTOMY Bilateral 2021    Procedure: TONSILLECTOMY;  Surgeon: Duc Alvarez MD;  Location: [facility] MAIN OR;  Service: ENT;  Laterality: Bilateral;   • WISDOM TOOTH EXTRACTION                          PT Assessment/Plan     Row Name 23 1500          PT Assessment    Assessment Comments Ms. Quintanilla  returns to PT reporting no hip pain but expresses mid/lower thoracic spine pain limiting her overall inability to bend forward. Initiated session with SL thoracic rotation and patient reports discomfort with R rotation leading to decrease in symptoms. Reviewed previously performed LE strengthen in all three planes of motion with less cues required throughout session. Ms. Quintanilla would benefit from one additional skilled PT session prior to transitioning to independent management.  -OLIVER        PT Plan    PT Plan Comments review side planks, standing clam and transverse lunge/step up, consider adding lateral step up, discharge  -OLIVER           User Key  (r) = Recorded By, (t) = Taken By, (c) = Cosigned By    Initials Name Provider Type    Tory Messer, PT Physical Therapist                   OP Exercises     Row Name 01/17/23 1500             Subjective Comments    Subjective Comments My hip is doing good but my mid back really started to hurt me  -OLIVER         Total Minutes    88420 - PT Therapeutic Exercise Minutes 45  -OLIVER         Exercise 1    Exercise Name 1 elipitcal  -OLIVER      Cueing 1 Verbal;Demo  -OLIVER      Time 1 5 mins  -OLIVER         Exercise 2    Exercise Name 2 SL thoracic rot  -OLIVER      Cueing 2 Verbal;Demo  -OLIVER      Sets 2 1  -OLIVER      Reps 2 10  -OLIVER      Time 2 pain noted with R rot  -OLIVER         Exercise 9    Exercise Name 9 reverse lunge  -OLIVER      Cueing 9 Verbal;Demo  -OLIVER      Sets 9 2  -OLIVER      Reps 9 10ea  -OLIVER      Time 9 5#  -OLIVER         Exercise 10    Exercise Name 10 lateral/transverse step up  -OLIVER      Cueing 10 Verbal;Demo  -OLIVER      Sets 10 1  -OLIVER      Reps 10 10ea  -OLIVER      Time 10 bosu  -OLIVER      Additional Comments w/   -OLIVER         Exercise 11    Exercise Name 11 lateral/transverse lunges  -OLIVER      Cueing 11 Verbal;Demo  -OLIVER      Reps 11 10ea  -OLIVER      Time 11 alternating  -OLIVER      Additional Comments 5# DB  -OLIVER         Exercise 12    Exercise Name 12 fwd step up  -OLIVER      Cueing 12  Verbal;Demo  -OLIVER      Reps 12 10e  -OLIVER      Time 12 bosu  -OLIVER         Exercise 13    Exercise Name 13 forward lunge  -OLIVER      Cueing 13 Verbal;Demo  -OLIVER      Reps 13 10e  -OLIVER      Time 13 knee reach 5# DB  -OLIVER            User Key  (r) = Recorded By, (t) = Taken By, (c) = Cosigned By    Initials Name Provider Type    Tory Messer, PT Physical Therapist                              PT OP Goals     Row Name 01/17/23 1500          PT Short Term Goals    STG Date to Achieve 01/04/23  -OLIVER     STG 1 Patient will be independent with initial HEP for hip stability.  -     STG 1 Progress Met  -OLIVER     STG 2 Patient will demonstrate 5/5 B hip strength to improve capacity for functional mobility including working out and doing housework.  -     STG 2 Progress Ongoing  -     STG 3 Patient will report a 50% improvement in incidences of hip popping, locking up, and pain to demonstrate improved symptoms and stability.  -     STG 3 Progress Ongoing;Progressing  -OLIVER        Long Term Goals    LTG Date to Achieve 02/03/23  -OLIVER     LTG 1 Patient will be independent with finalized HEP to maintain function and prevent return of symptoms.  -OLIVER     LTG 1 Progress Ongoing  -OLIVER     LTG 2 Patient will score 70/80 on the LEFS to demonstrate improved functional mobility and decreased limitation on daily activities from R hip pain and instability.  -OILVER     LTG 2 Progress Ongoing  -OLIVER     LTG 3 Patient will report a 80% improvement in incidences of hip popping, locking up, and pain to demonstrate improved symptoms and stability.  -     LTG 3 Progress Ongoing  -OLIVER           User Key  (r) = Recorded By, (t) = Taken By, (c) = Cosigned By    Initials Name Provider Type    Tory Messer, KATARINA Physical Therapist                Therapy Education  Education Details: updated HEP  Given: HEP  Program: Reinforced, Progressed  How Provided: Verbal, Demonstration, Written  Provided to: Patient  Level of Understanding:  Verbalized, Demonstrated              Time Calculation:   Start Time: 1525  Stop Time: 1610  Time Calculation (min): 45 min  Timed Charges  78215 - PT Therapeutic Exercise Minutes: 45  Total Minutes  Timed Charges Total Minutes: 45   Total Minutes: 45  Therapy Charges for Today     Code Description Service Date Service Provider Modifiers Qty    [id number]  PT THER PROC EA 15 MIN 1/17/2023 Tory Carlos, PT GP 3                    Tory Carlos, PT  1/17/2023

## 2023-01-20 ENCOUNTER — HOSPITAL ENCOUNTER (OUTPATIENT)
Dept: PHYSICAL THERAPY | Facility: HOSPITAL | Age: 23
Setting detail: THERAPIES SERIES
Discharge: HOME | End: 2023-01-20
Payer: COMMERCIAL

## 2023-01-20 DIAGNOSIS — R53.1 WEAKNESS: ICD-10-CM

## 2023-01-20 DIAGNOSIS — M25.551 RIGHT HIP PAIN: Primary | ICD-10-CM

## 2023-01-20 PROCEDURE — 97110 THERAPEUTIC EXERCISES: CPT

## 2023-01-20 NOTE — THERAPY DISCHARGE NOTE
Outpatient Physical Therapy Ortho Treatment Note/Discharge Summary  [facility]     Patient Name: Tran Quintanilla  : 2000  MRN: [id number]  Today's Date: 2023      Visit Date: 2023    Visit Dx:    ICD-10-CM ICD-9-CM   1. Right hip pain  M25.551 719.45   2. Weakness  R53.1 780.79       Patient Active Problem List   Diagnosis   • Migraine headache   • IUD (intrauterine device) in place   • Rectal bleeding   • H/O Clostridium difficile infection   • Abdominal pain   • Diarrhea   • Heartburn        Past Medical History:   Diagnosis Date   • Anesthesia     BLOOD PRESSURE DROPPED POST-OP WITH EAR TUBES AGE 4   • Anxiety    • Deviated septum    • History of Clostridium difficile colitis     NUMEROUS ANTIBIOTICS WITH STREP HX   • History of strep sore throat     7 TIMES IN    • Migraines    • Rectal bleeding         Past Surgical History:   Procedure Laterality Date   • COLONOSCOPY N/A 2022    Procedure: COLONOSCOPY TO CECUM AND TERMINAL ILEUM WITH BIOPSIES;  Surgeon: Kaleb Joyner MD;  Location: [facility] ENDOSCOPY;  Service: Gastroenterology;  Laterality: N/A;  PRE- BLOOD IN STOOL  POST- HEMORRHOIDS, ANAL FISSURE   • EAR TUBES     • ENDOSCOPY N/A 2022    Procedure: ESOPHAGOGASTRODUODENOSCOPY WITH BIOPSIES;  Surgeon: Kaleb Joyner MD;  Location: [facility] ENDOSCOPY;  Service: Gastroenterology;  Laterality: N/A;  PRE- ABDOMINAL PAIN  POST- MILD GASTRITIS   • SIGMOIDOSCOPY N/A 2022    Procedure: FLEXIBLE SIGMOIDOSCOPY WITH BIOPSIES;  Surgeon: Baron Ayoub MD;  Location: [facility] ENDOSCOPY;  Service: Gastroenterology;  Laterality: N/A;  Pre: rectal bleeding  Post: hemorrhoids   • TONSILLECTOMY Bilateral 2021    Procedure: TONSILLECTOMY;  Surgeon: Duc Alvarez MD;  Location: [facility] MAIN OR;  Service: ENT;  Laterality: Bilateral;   • WISDOM TOOTH EXTRACTION          PT Ortho     Row Name 23 1500       MMT Right Lower Ext    Rt Hip Flexion MMT, Gross Movement  (5/5) normal  -OLIVER    Rt Hip Extension MMT, Gross Movement (4+/5) good plus  -OLIVER    Rt Hip ABduction MMT, Gross Movement (5/5) normal  -OLIVER       MMT Left Lower Ext    Lt Hip Flexion MMT, Gross Movement (5/5) normal  -OLIVER    Lt Hip Extension MMT, Gross Movement (5/5) normal  -OLIVER    Lt Hip ABduction MMT, Gross Movement (5/5) normal  -OLIVER          User Key  (r) = Recorded By, (t) = Taken By, (c) = Cosigned By    Initials Name Provider Type    Tory Messer, PT Physical Therapist                             PT Assessment/Plan     Row Name 01/20/23 1500          PT Assessment    Assessment Comments Tran Quintanilla was seen for 7 physical therapy sessions for R hip pain. Patient reports significant improvement in her general pain and activity tolerance from the start of PT and now feels she is nearly 65% from her PLOF. Her primary complaint continues to involve hip weakness and occasional R hip pop/clicking. She reports the popping has overall improved by 60% since the start of PT and now feels it 3x/wk on average. Treatment included therapeutic exercise and patient education with home exercise program . Progress to physical therapy goals was good. Pt met 2/3 STG with partially meeting B hip strength goal and 2/3 LTG. Patient LEFS score improved to 73/80, where 80/80 indications no disability. Time spent discussing advanced HEP and appropriate progressions/modifications to make based on response following discharge and optimal frequency/duration to complete HEP over next several weeks-months. Patient verbalized understanding. She was discharged to an independent HEP and provided patient education to self-manage condition.  -OLIVER        PT Plan    PT Plan Comments discharged  -OLIVER           User Key  (r) = Recorded By, (t) = Taken By, (c) = Cosigned By    Initials Name Provider Type    Tory Messer, PT Physical Therapist                     OP Exercises     Row Name 01/20/23 1500             Subjective  Comments    Subjective Comments Things are going well  -         Total Minutes    74807 - PT Therapeutic Exercise Minutes 25  -OLIVER         Exercise 1    Exercise Name 1 elipitcal  -OLIVER      Cueing 1 Verbal;Demo  -OLIVER      Time 1 5 mins  -OLIVER         Exercise 6    Exercise Name 6 sideplanks  -OLIVER      Cueing 6 Verbal;Demo  -OLIVER      Reps 6 2  -OLIVER      Time 6 15sec  -OLIVER         Exercise 8    Exercise Name 8 standing clamshell  -OLIVER      Cueing 8 Verbal;Demo  -OLIVER      Sets 8 2  -OLIVER      Reps 8 10ea  -OLIVER      Time 8 GTB  -OLIVER         Exercise 11    Exercise Name 11 transverse lunges  -OLIVER      Cueing 11 Verbal;Demo  -OLIVER      Reps 11 10ea  -OLIVER            User Key  (r) = Recorded By, (t) = Taken By, (c) = Cosigned By    Initials Name Provider Type    Tory Messer, PT Physical Therapist                                PT OP Goals     Row Name 01/20/23 1500          PT Short Term Goals    STG Date to Achieve 01/04/23  -     STG 1 Patient will be independent with initial HEP for hip stability.  -     STG 1 Progress Met  -     STG 2 Patient will demonstrate 5/5 B hip strength to improve capacity for functional mobility including working out and doing housework.  -     STG 2 Progress Partially Met  -     STG 2 Progress Comments see ortho  -     STG 3 Patient will report a 50% improvement in incidences of hip popping, locking up, and pain to demonstrate improved symptoms and stability.  -     STG 3 Progress Met  -        Long Term Goals    LTG Date to Achieve 02/03/23  -     LTG 1 Patient will be independent with finalized HEP to maintain function and prevent return of symptoms.  -     LTG 1 Progress Met  -     LTG 2 Patient will score 70/80 on the LEFS to demonstrate improved functional mobility and decreased limitation on daily activities from R hip pain and instability.  -     LTG 2 Progress Met  -     LTG 2 Progress Comments 73/800  -     LTG 3 Patient will report a 80% improvement in  incidences of hip popping, locking up, and pain to demonstrate improved symptoms and stability.  -OLIVER     LTG 3 Progress Not Met  -OLIVER     LTG 3 Progress Comments 60%  -OLIVER           User Key  (r) = Recorded By, (t) = Taken By, (c) = Cosigned By    Initials Name Provider Type    Tory Messer, KATARINA Physical Therapist                     Outcome Measure Options: Lower Extremity Functional Scale (LEFS)  Lower Extremity Functional Index  Any of your usual work, housework or school activities: No difficulty  Your usual hobbies, recreational or sporting activities: A little bit of difficulty  Getting into or out of the bath: No difficulty  Walking between rooms: No difficulty  Putting on your shoes or socks: No difficulty  Squatting: A little bit of difficulty  Lifting an object, like a bag of groceries from the floor: No difficulty  Performing light activities around your home: No difficulty  Performing heavy activities around your home: A little bit of difficulty  Getting into or out of a car: No difficulty  Walking 2 blocks: No difficulty  Walking a mile: A little bit of difficulty  Going up or down 10 stairs (about 1 flight of stairs): A little bit of difficulty  Standing for 1 hour: No difficulty  Sitting for 1 hour: No difficulty  Running on even ground: A little bit of difficulty  Running on uneven ground: A little bit of difficulty  Making sharp turns while running fast: No difficulty  Hopping: No difficulty  Rolling over in bed: No difficulty  Total: 73      Time Calculation:   Start Time: 1530  Stop Time: 1555  Time Calculation (min): 25 min  Timed Charges  39733 - PT Therapeutic Exercise Minutes: 25  Total Minutes  Timed Charges Total Minutes: 25   Total Minutes: 25  Therapy Charges for Today     Code Description Service Date Service Provider Modifiers Qty    [id number]  PT THER PROC EA 15 MIN 1/20/2023 Tory Carlos, KATARINA GP 2          PT G-Codes  Outcome Measure Options: Lower Extremity  Functional Scale (LEFS)  Total: 73     OP PT Discharge Summary  Date of Discharge: 01/20/23  Reason for Discharge: Maximum functional potential achieved  Outcomes Achieved: Patient able to partially acheive established goals  Discharge Destination: Home with home program      oTry Carlos, PT  1/20/2023

## 2023-02-13 ENCOUNTER — OFFICE VISIT (OUTPATIENT)
Dept: INTERNAL MEDICINE | Facility: CLINIC | Age: 23
End: 2023-02-13
Payer: COMMERCIAL

## 2023-02-13 VITALS
TEMPERATURE: 96.8 F | DIASTOLIC BLOOD PRESSURE: 70 MMHG | BODY MASS INDEX: 23.56 KG/M2 | SYSTOLIC BLOOD PRESSURE: 120 MMHG | HEIGHT: 66 IN | WEIGHT: 146.6 LBS

## 2023-02-13 DIAGNOSIS — G43.909 MIGRAINE WITHOUT STATUS MIGRAINOSUS, NOT INTRACTABLE, UNSPECIFIED MIGRAINE TYPE: ICD-10-CM

## 2023-02-13 DIAGNOSIS — T14.8XXA MUSCLE STRAIN: Primary | ICD-10-CM

## 2023-02-13 PROCEDURE — 99213 OFFICE O/P EST LOW 20 MIN: CPT | Performed by: PHYSICIAN ASSISTANT

## 2023-02-13 RX ORDER — TOPIRAMATE 50 MG/1
50 TABLET, FILM COATED ORAL DAILY
Qty: 180 TABLET | Refills: 1
Start: 2023-02-13 | End: 2023-05-14

## 2023-02-13 RX ORDER — CYCLOBENZAPRINE HCL 5 MG
5 TABLET ORAL 3 TIMES DAILY PRN
Qty: 30 TABLET | Refills: 0 | Status: SHIPPED | OUTPATIENT
Start: 2023-02-13

## 2023-02-13 RX ORDER — METHYLPREDNISOLONE 4 MG/1
TABLET ORAL
Qty: 21 TABLET | Refills: 0 | Status: SHIPPED | OUTPATIENT
Start: 2023-02-13

## 2023-02-13 NOTE — PROGRESS NOTES
Subjective   Chief Complaint   Patient presents with   • Neck Pain     Left side        History of Present Illness     Pt here today with left neck pain and stiffness that started after she woke up one morning. Has pain with turning her head to the left and stretching her neck. No injury she is aware of. Ibuprofen helps some. No radicular pain. Decreased her Topamax from 50 mg BID to qd due to numbness in her fingers and toes.      Patient Active Problem List   Diagnosis   • Migraine headache   • IUD (intrauterine device) in place   • Rectal bleeding   • H/O Clostridium difficile infection   • Abdominal pain   • Diarrhea   • Heartburn       No Known Allergies    Current Outpatient Medications on File Prior to Visit   Medication Sig Dispense Refill   • acetaminophen (TYLENOL) 500 MG tablet Take 1,000 mg by mouth Every 6 (Six) Hours As Needed for Mild Pain.     • busPIRone (BUSPAR) 5 MG tablet Take 1 tablet by mouth 3 (Three) Times a Day. (Patient taking differently: Take 5 mg by mouth Daily As Needed.) 90 tablet 1   • clindamycin (CLEOCIN T) 1 % lotion Apply  topically to the appropriate area as directed Every Morning.     • dicyclomine (Bentyl) 10 MG capsule Take 1 capsule by mouth 4 (Four) Times a Day Before Meals & at Bedtime. (Patient taking differently: Take 10 mg by mouth 4 (Four) Times a Day As Needed.) 90 capsule 0   • Levonorgestrel (KYLEENA IU) by Intrauterine route.     • polycarbophil 625 MG tablet tablet Take  by mouth Daily.     • promethazine (PHENERGAN) 25 MG tablet Take 1 tablet by mouth Every 6 (Six) Hours As Needed for Nausea or Vomiting. 30 tablet 0   • spironolactone (ALDACTONE) 100 MG tablet Take 100 mg by mouth Every Night. For acne     • spironolactone (ALDACTONE) 100 MG tablet Take 1 tablet by mouth Daily. 30 tablet 6   • SUMAtriptan (Imitrex) 50 MG tablet Take one tablet at onset of headache. May repeat dose one time in 2 hours if headache not relieved. 9 tablet 0   • tretinoin (RETIN-A)  0.025 % cream Apply 1 application topically to the appropriate area as directed Every Night.     • [DISCONTINUED] topiramate (Topamax) 50 MG tablet Take 1 tablet by mouth 2 (Two) Times a Day for 90 days. 180 tablet 1     No current facility-administered medications on file prior to visit.       Past Medical History:   Diagnosis Date   • Anesthesia     BLOOD PRESSURE DROPPED POST-OP WITH EAR TUBES AGE 4   • Anxiety    • Deviated septum    • History of Clostridium difficile colitis     NUMEROUS ANTIBIOTICS WITH STREP HX   • History of strep sore throat     7 TIMES IN 2020   • Migraines    • Rectal bleeding        Family History   Problem Relation Age of Onset   • Diabetes Mother    • Cancer Father         Melanoma   • Hyperlipidemia Father    • Hypertension Father    • Epilepsy Father    • Migraines Paternal Aunt    • Diabetes Maternal Grandmother         CHF & Diabetes   • Cancer Paternal Grandfather         Melanoma   • Malig Hyperthermia Neg Hx    • Colon cancer Neg Hx    • Colon polyps Neg Hx    • Crohn's disease Neg Hx    • Irritable bowel syndrome Neg Hx    • Ulcerative colitis Neg Hx        Social History     Socioeconomic History   • Marital status:    Tobacco Use   • Smoking status: Never   • Smokeless tobacco: Never   • Tobacco comments:     vaping   Vaping Use   • Vaping Use: Former   Substance and Sexual Activity   • Alcohol use: Yes     Comment: Social   • Drug use: Never   • Sexual activity: Yes     Partners: Male     Birth control/protection: Condom, I.U.D., Same-sex partner       Past Surgical History:   Procedure Laterality Date   • COLONOSCOPY N/A 09/01/2022    Procedure: COLONOSCOPY TO CECUM AND TERMINAL ILEUM WITH BIOPSIES;  Surgeon: Kaleb Joyner MD;  Location: [facility] ENDOSCOPY;  Service: Gastroenterology;  Laterality: N/A;  PRE- BLOOD IN STOOL  POST- HEMORRHOIDS, ANAL FISSURE   • EAR TUBES     • ENDOSCOPY N/A 09/01/2022    Procedure: ESOPHAGOGASTRODUODENOSCOPY WITH BIOPSIES;  Surgeon:  Kaleb Joyner MD;  Location: [facility] ENDOSCOPY;  Service: Gastroenterology;  Laterality: N/A;  PRE- ABDOMINAL PAIN  POST- MILD GASTRITIS   • SIGMOIDOSCOPY N/A 03/11/2022    Procedure: FLEXIBLE SIGMOIDOSCOPY WITH BIOPSIES;  Surgeon: Baron Ayoub MD;  Location: [facility] ENDOSCOPY;  Service: Gastroenterology;  Laterality: N/A;  Pre: rectal bleeding  Post: hemorrhoids   • TONSILLECTOMY Bilateral 07/13/2021    Procedure: TONSILLECTOMY;  Surgeon: Duc Alvarez MD;  Location: [facility] MAIN OR;  Service: ENT;  Laterality: Bilateral;   • WISDOM TOOTH EXTRACTION         The following portions of the patient's history were reviewed and updated as appropriate: problem list, allergies, current medications, past medical history, past family history, past social history and past surgical history.    ROS     See HPI    Immunization History   Administered Date(s) Administered   • COVID-19 (PFIZER) PURPLE CAP 12/22/2020, 01/12/2021   • DTaP 2000, 1998, 01/21/1999, 01/13/2000, 07/02/2002   • Flu Vaccine Intradermal Quad 18-64YR 10/23/2007, 10/30/2015   • Flu Vaccine Split Quad 02/28/2017, 10/06/2017, 09/20/2018, 09/24/2019   • FluLaval/Fluzone >6mos 02/28/2017, 10/06/2017   • Fluzone Quad >6mos (Multi-dose) 10/24/2001   • HPV Quadrivalent 07/13/2009, 09/14/2009, 01/15/2010   • Hep A / Hep B 01/02/2018, 02/07/2018, 07/12/2018   • Hep A, 2 Dose 10/24/2008, 07/13/2009, 07/31/2009   • Hep B, Adolescent or Pediatric 1998, 1998, 01/21/1999   • Hib (HbOC) 1998, 1998, 01/21/1999, 01/13/2000   • Hib (PRP-OMP) 1998, 1998, 01/21/1999, 01/13/2000   • Hpv9 11/09/2017   • IPV 1998, 1998, 01/13/2000, 07/12/2002   • Influenza LAIV (Nasal) 10/24/2008   • Influenza Quad Vaccine (Inpatient) 10/24/2001   • MMR 10/11/1999, 07/12/2002   • Meningococcal MCV4P (Menactra) 09/14/2009, 11/09/2017   • OPV 1998, 1998, 01/13/2000   • PPD Test 02/28/2017, 11/09/2017   • Pneumococcal  "Conjugate 13-Valent (PCV13) 11/22/2000   • Tdap 07/13/2009, 12/01/2017   • Varicella 07/15/1999, 01/13/2000, 10/24/2008, 02/07/2018       Objective   Vitals:    02/13/23 1502   BP: 120/70   Temp: 96.8 °F (36 °C)   TempSrc: Temporal   Weight: 66.5 kg (146 lb 9.6 oz)   Height: 167.6 cm (65.98\")     Body mass index is 23.68 kg/m².  Physical Exam  Vitals reviewed.   Constitutional:       Appearance: Normal appearance.   HENT:      Head: Normocephalic and atraumatic.   Musculoskeletal:      Comments: TTP over left trapezius and scalene muscles. Full ROM of neck and left shoulder. No radicular sx.    Neurological:      Mental Status: She is alert.         Assessment & Plan   Diagnoses and all orders for this visit:    1. Muscle strain (Primary)    2. Migraine without status migrainosus, not intractable, unspecified migraine type  -     topiramate (Topamax) 50 MG tablet; Take 1 tablet by mouth Daily for 90 days.  Dispense: 180 tablet; Refill: 1    Other orders  -     methylPREDNISolone (MEDROL) 4 MG dose pack; Take as directed on package instructions.  Dispense: 21 tablet; Refill: 0  -     cyclobenzaprine (FLEXERIL) 5 MG tablet; Take 1 tablet by mouth 3 (Three) Times a Day As Needed for Muscle Spasms.  Dispense: 30 tablet; Refill: 0                 "

## 2023-02-21 DIAGNOSIS — M54.2 NECK PAIN: Primary | ICD-10-CM

## 2023-03-27 ENCOUNTER — HOSPITAL ENCOUNTER (OUTPATIENT)
Dept: PHYSICAL THERAPY | Facility: HOSPITAL | Age: 23
Setting detail: THERAPIES SERIES
Discharge: HOME | End: 2023-03-27
Payer: COMMERCIAL

## 2023-03-27 DIAGNOSIS — R53.1 WEAKNESS: ICD-10-CM

## 2023-03-27 DIAGNOSIS — M25.551 RIGHT HIP PAIN: Primary | ICD-10-CM

## 2023-03-27 PROCEDURE — 97161 PT EVAL LOW COMPLEX 20 MIN: CPT

## 2023-03-27 PROCEDURE — 97110 THERAPEUTIC EXERCISES: CPT

## 2023-03-27 NOTE — THERAPY EVALUATION
Outpatient Physical Therapy Ortho Initial Evaluation  [facility]     Patient Name: Tran Quintanilla  : 2000  MRN: [id number]  Today's Date: 3/27/2023      Visit Date: 2023    Patient Active Problem List   Diagnosis   • Migraine headache   • IUD (intrauterine device) in place   • Rectal bleeding   • H/O Clostridium difficile infection   • Abdominal pain   • Diarrhea   • Heartburn        Past Medical History:   Diagnosis Date   • Anesthesia     BLOOD PRESSURE DROPPED POST-OP WITH EAR TUBES AGE 4   • Anxiety    • Deviated septum    • History of Clostridium difficile colitis     NUMEROUS ANTIBIOTICS WITH STREP HX   • History of strep sore throat     7 TIMES IN    • Migraines    • Rectal bleeding         Past Surgical History:   Procedure Laterality Date   • COLONOSCOPY N/A 2022    Procedure: COLONOSCOPY TO CECUM AND TERMINAL ILEUM WITH BIOPSIES;  Surgeon: Kaleb Joyner MD;  Location: [facility] ENDOSCOPY;  Service: Gastroenterology;  Laterality: N/A;  PRE- BLOOD IN STOOL  POST- HEMORRHOIDS, ANAL FISSURE   • EAR TUBES     • ENDOSCOPY N/A 2022    Procedure: ESOPHAGOGASTRODUODENOSCOPY WITH BIOPSIES;  Surgeon: Kaleb Joyner MD;  Location: [facility] ENDOSCOPY;  Service: Gastroenterology;  Laterality: N/A;  PRE- ABDOMINAL PAIN  POST- MILD GASTRITIS   • SIGMOIDOSCOPY N/A 2022    Procedure: FLEXIBLE SIGMOIDOSCOPY WITH BIOPSIES;  Surgeon: Baron Ayoub MD;  Location: [facility] ENDOSCOPY;  Service: Gastroenterology;  Laterality: N/A;  Pre: rectal bleeding  Post: hemorrhoids   • TONSILLECTOMY Bilateral 2021    Procedure: TONSILLECTOMY;  Surgeon: Duc Alvarez MD;  Location: [facility] OR;  Service: ENT;  Laterality: Bilateral;   • WISDOM TOOTH EXTRACTION         Visit Dx:     ICD-10-CM ICD-9-CM   1. Right hip pain  M25.551 719.45   2. Weakness  R53.1 780.79          Patient History     Row Name 23 1500             History    Brief Description of Current Complaint Pt  reports L sided cervical pain upon waking one day, 1 month ago. Pt has trigger point in L shoulder and put on muscle relaxer and steroid which helped, but did not resolve symptoms. Pt initially had difficulty rotating B. Pain radiates to shoulder and prevents sleep. Pt has attempted several different pillows or no pillows and notices stiffness in am which improves slightly, however trigger point pain does not subside. Pt has not had treatment to date. Pt notices trigger point with driving and affecting sleep more than anything. No imaging to date. Pt drives 1 hour to work every day.  -CN      Patient/Caregiver Goals Relieve pain;Return to prior level of function;Improve mobility  -CN      Hand Dominance right-handed  -CN         Pain     Pain Location Neck  -CN      Pain at Worst 6  -CN      Pain Description Sharp  -CN      What Performance Factors Make the Current Problem(s) WORSE? Worst in middle of night, driving, sitting for extended period of time (driving)  -CN      What Performance Factors Make the Current Problem(s) BETTER? Movement/not staying in same position, popping shoulder blade,  massages  -CN      Is your sleep disturbed? Yes  sometimes have trouble falling asleep or getting comfortable, sometimes wake up during the night and can't fall back asleep.  -CN      Difficulties at work? 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  -CN      Difficulties with ADL's? Yes, driving, sleeping  -CN      Difficulties with recreational activities? Yes, remodeling house and trouble at times (sanding cabinets)  -CN         Fall Risk Assessment    Any falls in the past year: No  -CN         Services    Are you currently receiving Home Health services No  -CN         Daily Activities    Primary Language English  -CN      Are you able to read Yes  -CN      Are you able to write Yes  -CN      How does patient learn best? Listening;Reading;Demonstration  -CN      Barriers to  learning None  -CN      Pt Participated in POC and Goals Yes  -CN         Safety    Are you being hurt, hit, or frightened by anyone at home or in your life? No  -CN      Are you being neglected by a caregiver No  -CN            User Key  (r) = Recorded By, (t) = Taken By, (c) = Cosigned By    Initials Name Provider Type    Marissa Harrington, KATARINA Physical Therapist                 PT Ortho     Row Name 03/27/23 1500       Posture/Observations    Alignment Options Forward head;Rounded shoulders;Scapular elevation  -CN    Forward Head Mild  -CN    Rounded Shoulders Mild  -CN    Scapular Elevation Right:;Elevated  -CN       Myotomal Screen- Upper Quarter Clearing    Shoulder flexion (C5) Bilateral:;5 (Normal)  -CN    Elbow flexion/wrist extension (C6) Bilateral:;5 (Normal)  -CN    Elbow extension/wrist flexion (C7) Bilateral:;5 (Normal)  -CN       Cervical/Shoulder ROM Screen    Cervical flexion Normal  -CN    Cervical extension Normal  -CN    Cervical lateral flexion Impaired  B=100% pain on the L with R SBing  -CN    Cervical rotation Normal  -CN       Cervical Palpation    Levator Scapula Left:;Guarded/taut;Trigger point  -CN    Upper Traps Left:;Guarded/taut;Trigger point  -CN    Middle Traps Left:;Trigger point  -CN       Cervical Accessory Motions    PA glide- C7 Hypomobile  -CN       Thoracic Accessory Motions    PA glide- T1 Hypomobile  -CN    PA glide- T2 Hypomobile  -CN    PA glide- T3 Hypomobile  -CN    PA glide- T5 Hypomobile  -CN    PA glide- T6 Hypomobile  -CN       Upper Extremity Flexibility    Upper Trapezius Mildly limited  -CN    Levator Scapula Mildly limited  -CN    Pect Minor Mildly limited  -CN    Pect Major Mildly limited  -CN          User Key  (r) = Recorded By, (t) = Taken By, (c) = Cosigned By    Initials Name Provider Type    Marissa Harrington PT Physical Therapist                            Therapy Education  Education Details: Access code [email or web address], anatomy, goals of PT,  eval findings, prognosis, risk/benefit DDN, posture  Given: HEP, Symptoms/condition management, Pain management, Posture/body mechanics  Program: New  How Provided: Verbal, Demonstration, Written  Provided to: Patient  Level of Understanding: Teach back education performed, Verbalized, Demonstrated      PT OP Goals     Row Name 03/27/23 1600          PT Short Term Goals    STG Date to Achieve 04/27/23  -CN     STG 1 Pt will report pain rated 2/10 at worst in order to demonstrate ability to return to normalized ADLs and functional activities.  -CN     STG 1 Progress New  -CN     STG 2 Pt will be independent with initial HEP for symptom management.  -CN     STG 2 Progress New  -CN        Long Term Goals    LTG Date to Achieve 05/27/23  -CN     LTG 1 Pt will be independent and compliant with advanced HEP for long term management of symptoms and prevention of future occurrence.  -CN     LTG 1 Progress New  -CN     LTG 2 Pt will reduce level of perceived disability as measured by the NDI  to 12% in order to improve QOL.  -CN     LTG 2 Progress New  -CN     LTG 3 Pt will report 50% reduction in sleep disturbance in order to improve QOL.  -CN     LTG 3 Progress New  -CN     LTG 4 Pt will be able to drive 1 hour without exacerbation of symptoms in order to increase ease of commute to work.  -CN     LTG 4 Progress New  -CN        Time Calculation    PT Goal Re-Cert Due Date 04/27/23  -CN           User Key  (r) = Recorded By, (t) = Taken By, (c) = Cosigned By    Initials Name Provider Type    Marissa Harrington, PT Physical Therapist                 PT Assessment/Plan     Row Name 03/27/23 1619          PT Assessment    Assessment Comments 22 y.o. female referred to outpatient physical therapy for evaluation and treatment of left cervical spine/shoulder.  Patient presents with poor posture, trigger points L UT/levator/middle trap, R shoulder elevation, hypomobility upper thoracic spine. Pt's signs and symptoms are  consistent with referring diagnosis.  Pertinent comorbidities and personal factors that may affect progress include, but are not limited to, lifting at work, 1 hour commute to work.  Pt scored 36% on the NDI where 0% is no disability and is in stable clinical condition. Pt would benefit from skilled PT to address functional deficits and return to PLOF.  -CN     Please refer to paper survey for additional self-reported information Yes  -CN     Rehab Potential Good  -CN     Patient/caregiver participated in establishment of treatment plan and goals Yes  -CN     Patient would benefit from skilled therapy intervention Yes  -CN        PT Plan    PT Frequency 1x/week;2x/week  -CN     Predicted Duration of Therapy Intervention (PT) 6-10 visits  -CN     Planned CPT's? PT EVAL LOW COMPLEXITY: 69301;PT RE-EVAL: 84328;PT THER PROC EA 15 MIN: 74617;PT THER ACT EA 15 MIN: 63334;PT MANUAL THERAPY EA 15 MIN: 00865;PT NEUROMUSC RE-EDUCATION EA 15 MIN: 37181;PT SELF CARE/HOME MGMT/TRAIN EA 15: 82719;PT HOT OR COLD PACK TREAT [facility];PT ELECTRICAL STIM UNATTEND: ;PT TRACTION CERVICAL: 06199  -CN     PT Plan Comments Assess response to evaluation. Likely proceed with DDN L UT/levator next visit. Pt would benefit from postural strengthening and positioning with sleeping and driving tasks.  -CN           User Key  (r) = Recorded By, (t) = Taken By, (c) = Cosigned By    Initials Name Provider Type    Marissa Harrington, PT Physical Therapist                   OP Exercises     Row Name 03/27/23 1600             Total Minutes    16256 - PT Therapeutic Exercise Minutes 10  -CN         Exercise 1    Exercise Name 1 Chin tuck  -CN      Cueing 1 Verbal;Demo  -CN      Reps 1 10  -CN      Time 1 5 sec  -CN         Exercise 2    Exercise Name 2 SL arcs  -CN      Cueing 2 Verbal;Demo  -CN      Reps 2 10 B  -CN         Exercise 3    Exercise Name 3 Seated UT/levator stretch, hand on table  -CN      Cueing 3 Verbal;Demo  -CN      Reps 3 3   -CN      Time 3 20 sec  -CN            User Key  (r) = Recorded By, (t) = Taken By, (c) = Cosigned By    Initials Name Provider Type    Marissa Harrington, PT Physical Therapist                              Outcome Measure Options: Neck Disability Index (NDI)  Neck Disability Index  Section 1 - Pain Intensity: The pain is mild at the moment.  Section 2 - Personal Care: I can look after myself normally without causing extra pain.  Section 3 - Lifting: I can lift very light weights.  Section 4 - Work: I can only do my usual work, but no more  Section 5 - Headaches: I have headaches almost all the time.  Section 6 - Concentration: I can concentrate fully when I want to without difficulty.  Section 7 - Sleeping: My sleep is moderately disturbed (2-3 hours sleepless).  Section 8 - Driving: I can drive as long as I want with moderate neck pain.  Section 9 - Reading: I can read as much as I want with no neck pain.  Section 10 - Recreation: I am able to engage in most but not all recreational activities because of pain in my neck.  Neck Disability Index Score: 18      Time Calculation:     Start Time: 1530  Stop Time: 1609  Time Calculation (min): 39 min  Timed Charges  81887 - PT Therapeutic Exercise Minutes: 10  Total Minutes  Timed Charges Total Minutes: 10   Total Minutes: 10     Therapy Charges for Today     Code Description Service Date Service Provider Modifiers Qty    29753425249 HC PT THER PROC EA 15 MIN 3/27/2023 Marissa Prater, PT GP 1    69840234849 HC PT EVAL LOW COMPLEXITY 2 3/27/2023 Marissa Prater, PT GP 1          PT G-Codes  Outcome Measure Options: Neck Disability Index (NDI)  Neck Disability Index Score: 18         Marissa Prater PT  3/27/2023

## 2023-03-29 ENCOUNTER — HOSPITAL ENCOUNTER (OUTPATIENT)
Dept: PHYSICAL THERAPY | Facility: HOSPITAL | Age: 23
Setting detail: THERAPIES SERIES
Discharge: HOME | End: 2023-03-29

## 2023-03-29 DIAGNOSIS — R53.1 WEAKNESS: ICD-10-CM

## 2023-03-29 DIAGNOSIS — M25.551 RIGHT HIP PAIN: Primary | ICD-10-CM

## 2023-03-29 NOTE — THERAPY TREATMENT NOTE
Outpatient Physical Therapy Ortho Treatment Note  [facility]     Patient Name: Tran Quintanilla  : 2000  MRN: [id number]  Today's Date: 3/29/2023      Visit Date: 2023    Visit Dx:    ICD-10-CM ICD-9-CM   1. Right hip pain  M25.551 719.45   2. Weakness  R53.1 780.79       Patient Active Problem List   Diagnosis   • Migraine headache   • IUD (intrauterine device) in place   • Rectal bleeding   • H/O Clostridium difficile infection   • Abdominal pain   • Diarrhea   • Heartburn        Past Medical History:   Diagnosis Date   • Anesthesia     BLOOD PRESSURE DROPPED POST-OP WITH EAR TUBES AGE 4   • Anxiety    • Deviated septum    • History of Clostridium difficile colitis     NUMEROUS ANTIBIOTICS WITH STREP HX   • History of strep sore throat     7 TIMES IN    • Migraines    • Rectal bleeding         Past Surgical History:   Procedure Laterality Date   • COLONOSCOPY N/A 2022    Procedure: COLONOSCOPY TO CECUM AND TERMINAL ILEUM WITH BIOPSIES;  Surgeon: Kaleb Joyner MD;  Location: [facility] ENDOSCOPY;  Service: Gastroenterology;  Laterality: N/A;  PRE- BLOOD IN STOOL  POST- HEMORRHOIDS, ANAL FISSURE   • EAR TUBES     • ENDOSCOPY N/A 2022    Procedure: ESOPHAGOGASTRODUODENOSCOPY WITH BIOPSIES;  Surgeon: Kaleb Joyner MD;  Location: [facility] ENDOSCOPY;  Service: Gastroenterology;  Laterality: N/A;  PRE- ABDOMINAL PAIN  POST- MILD GASTRITIS   • SIGMOIDOSCOPY N/A 2022    Procedure: FLEXIBLE SIGMOIDOSCOPY WITH BIOPSIES;  Surgeon: Baron Ayoub MD;  Location: [facility] ENDOSCOPY;  Service: Gastroenterology;  Laterality: N/A;  Pre: rectal bleeding  Post: hemorrhoids   • TONSILLECTOMY Bilateral 2021    Procedure: TONSILLECTOMY;  Surgeon: Duc Alvarez MD;  Location: [facility] MAIN OR;  Service: ENT;  Laterality: Bilateral;   • WISDOM TOOTH EXTRACTION                          PT Assessment/Plan     Row Name 23 1600          PT Assessment    Assessment Comments Ms. Quintanilla  arrives to PT for follow up after initial evaluation earlier this week and reports limited improvement in her L UT/LS muscle tension. Due to minimal change, patient wishes to proceed with trigger point dry needling. Written informed consent reviewed and signed prior to initiating treatment. Targeted L sided UT/LS muscles and patient responded with mild-moderate LTRs. She was without adverse events throughout session. She experieced immediate slight reduction in L sided muscle tension. Encouraged HEP complaince later today and again for tomorrow in attempt to reduce onset of soreness. Ms. Quintanilla remains a good candidate for skilled PT.  -OLIVER        PT Plan    PT Plan Comments response to DDN, perform again PRN, resume from previous plan  -OLIVER           User Key  (r) = Recorded By, (t) = Taken By, (c) = Cosigned By    Initials Name Provider Type    Tory Messer, PT Physical Therapist                   OP Exercises     Row Name 03/29/23 1600             Subjective Comments    Subjective Comments I am still having a lot of tension and I think I want to try the DDN  -OLIVER         Exercise 3    Exercise Name 3 Seated UT/levator stretch, hand on table  -OLIVER      Cueing 3 Verbal;Demo  -OLIVER      Additional Comments reviewed for HEP  -OLIVER            User Key  (r) = Recorded By, (t) = Taken By, (c) = Cosigned By    Initials Name Provider Type    Tory Messer, PT Physical Therapist                         Manual Rx (last 36 hours)     Manual Treatments     Row Name 03/29/23 1500             Manual Rx 1    Manual Rx 1 Location intramuscular manual therapy  -OLIVER      Manual Rx 1 Type dry-needling  -OLIVER      Manual Rx 1 Grade 28  -OLIVER    Tran Quintanilla was assessed for dry-needling and intramuscular manual therapy.     Discussed risks/benefits of Dry Needling with patient, including but not limited to muscle soreness, bruising, vasovagal response, pneumothorax, nerve injury.     Patient signed written consent to  proceed with treatment.    Patient position in supine/R SLing during treatment and L UT/LS muscles treated. Patient responded with multiple mild-moderate LTRs and immediate decrease in L sided muscle tension. Utilized 30-50mm needles. Patient without adverse events.     Utilized palpation before, during, and after to facilitate assessment for trigger points and musclar integrity.     Clean needle technique observed at all times, precautions for lung fields, neurovascular structures observed.             User Key  (r) = Recorded By, (t) = Taken By, (c) = Cosigned By    Initials Name Provider Type    Tory Messre PT Physical Therapist                 PT OP Goals     Row Name 03/29/23 1500          PT Short Term Goals    STG Date to Achieve --  -OLIVER     STG 1 --  -OLIVER     STG 1 Progress --  -OLIVER     STG 2 --  -OLIVER     STG 2 Progress --  -OLIVER        Long Term Goals    LTG Date to Achieve --  -OLIVER     LTG 1 --  -OLIVER     LTG 1 Progress --  -OLIVER     LTG 2 --  -OLIVER     LTG 2 Progress --  -OLIVER     LTG 3 --  -OLIVER     LTG 3 Progress --  -OLIVER     LTG 4 --  -OLIVER     LTG 4 Progress --  -OLIVER           User Key  (r) = Recorded By, (t) = Taken By, (c) = Cosigned By    Initials Name Provider Type    Tory Messer PT Physical Therapist                Therapy Education  Education Details: risk/benefit of DDN and written informed consent signed              Time Calculation:   Start Time: 1532  Stop Time: 1600  Time Calculation (min): 28 min  Therapy Charges for Today     Code Description Service Date Service Provider Modifiers Qty    [id number]  PT SELF PAY DRY NEEDLING SESSION 3/29/2023 Tory Carlos, PT  1                    Tory Carlos PT  3/29/2023

## 2023-04-04 ENCOUNTER — HOSPITAL ENCOUNTER (OUTPATIENT)
Dept: PHYSICAL THERAPY | Facility: HOSPITAL | Age: 23
Setting detail: THERAPIES SERIES
Discharge: HOME | End: 2023-04-04
Payer: COMMERCIAL

## 2023-04-04 DIAGNOSIS — M54.2 CERVICAL MUSCLE PAIN: Primary | ICD-10-CM

## 2023-04-04 PROCEDURE — 97110 THERAPEUTIC EXERCISES: CPT

## 2023-04-04 PROCEDURE — 97140 MANUAL THERAPY 1/> REGIONS: CPT

## 2023-04-04 NOTE — THERAPY PROGRESS REPORT/RE-CERT
Outpatient Physical Therapy Ortho Progress Note  [facility]     Patient Name: Tran Quintanilla  : 2000  MRN: [id number]  Today's Date: 2023      Visit Date: 2023    Visit Dx:    ICD-10-CM ICD-9-CM   1. Cervical muscle pain  M54.2 723.1       Patient Active Problem List   Diagnosis   • Migraine headache   • IUD (intrauterine device) in place   • Rectal bleeding   • H/O Clostridium difficile infection   • Abdominal pain   • Diarrhea   • Heartburn        Past Medical History:   Diagnosis Date   • Anesthesia     BLOOD PRESSURE DROPPED POST-OP WITH EAR TUBES AGE 4   • Anxiety    • Deviated septum    • History of Clostridium difficile colitis     NUMEROUS ANTIBIOTICS WITH STREP HX   • History of strep sore throat     7 TIMES IN    • Migraines    • Rectal bleeding         Past Surgical History:   Procedure Laterality Date   • COLONOSCOPY N/A 2022    Procedure: COLONOSCOPY TO CECUM AND TERMINAL ILEUM WITH BIOPSIES;  Surgeon: Kaleb Joyner MD;  Location: [facility] ENDOSCOPY;  Service: Gastroenterology;  Laterality: N/A;  PRE- BLOOD IN STOOL  POST- HEMORRHOIDS, ANAL FISSURE   • EAR TUBES     • ENDOSCOPY N/A 2022    Procedure: ESOPHAGOGASTRODUODENOSCOPY WITH BIOPSIES;  Surgeon: Kaleb Joyner MD;  Location: [facility] ENDOSCOPY;  Service: Gastroenterology;  Laterality: N/A;  PRE- ABDOMINAL PAIN  POST- MILD GASTRITIS   • SIGMOIDOSCOPY N/A 2022    Procedure: FLEXIBLE SIGMOIDOSCOPY WITH BIOPSIES;  Surgeon: Baron Ayoub MD;  Location: [facility] ENDOSCOPY;  Service: Gastroenterology;  Laterality: N/A;  Pre: rectal bleeding  Post: hemorrhoids   • TONSILLECTOMY Bilateral 2021    Procedure: TONSILLECTOMY;  Surgeon: Duc Alvarez MD;  Location: [facility] MAIN OR;  Service: ENT;  Laterality: Bilateral;   • WISDOM TOOTH EXTRACTION                          PT Assessment/Plan     Row Name 23 0700          PT Assessment    Assessment Comments Tran Quintanilla has been seen for 3  physical therapy sessions for L sided neck pain and muscle tension. She received trigger point dry needling session and reports signification reduction in tension two days later allowing her to sleep through the night. Her symptoms have since returned to baseline causing her to wake up hourly last night. She feels this is directly related to painting her kitchen cabinets while resting on platform 32 inches off the ground. Treatment has included therapeutic exercise, manual therapy, patient education with home exercise program  and dry needling . Patient unable to meet any goals at this time due to competition of limited sessions, although progressing. Initiated postural strengthening exercises, discussed postural awareness with household chores and work related tasks and pillow/sleep positioning in attempt to improve sleep quality. Updated HEP and reviewed changes. She will benefit from continued skilled physical therapy to address remaining impairments and functional limitations.  -OLIVER     Please refer to paper survey for additional self-reported information No  -OLIVER     Rehab Potential Good  -OLIVER     Patient/caregiver participated in establishment of treatment plan and goals Yes  -OLIVER     Patient would benefit from skilled therapy intervention Yes  -OLIVER        PT Plan    PT Frequency 1x/week;2x/week  -OLIVER     Predicted Duration of Therapy Intervention (PT) 8 visits  -OLIVER     Planned CPT's? PT RE-EVAL: 88121;PT THER PROC EA 15 MIN: 48251;PT THER ACT EA 15 MIN: 31358;PT MANUAL THERAPY EA 15 MIN: 00232;PT NEUROMUSC RE-EDUCATION EA 15 MIN: 55578;PT SELF CARE/HOME MGMT/TRAIN EA 15: 74256;PT HOT OR COLD PACK TREAT MCARE;PT ELECTRICAL STIM UNATTEND: ;PT TRACTION CERVICAL: 70939  -OLIVER     PT Plan Comments add noodle to table ext, shoulder flex theraloop  -OLIVER           User Key  (r) = Recorded By, (t) = Taken By, (c) = Cosigned By    Initials Name Provider Type    Tory Messer, PT Physical Therapist                    OP Exercises     Row Name 04/04/23 0700             Subjective Comments    Subjective Comments I was sore after the needling and then it really helped after two days. Discomfort returned after painting cabinets and I could barely sleep  -OLIVER         Total Minutes    14521 - PT Therapeutic Exercise Minutes 33  -OLIVER      31627 - PT Manual Therapy Minutes 10  -OLIVER         Exercise 1    Exercise Name 1 Chin tuck  -OLIVER      Cueing 1 Verbal;Demo  -OLIVER      Reps 1 10  -OLIVER      Time 1 5 sec  -OLIVER      Additional Comments supine  -OLIVER         Exercise 2    Exercise Name 2 SL arcs  -OLIVER      Cueing 2 Verbal;Demo  -OLIVER      Reps 2 10 B  -OLIVER         Exercise 3    Exercise Name 3 Seated UT/levator stretch, hand on table  -OLIVER      Cueing 3 Verbal;Demo  -OLIVER      Reps 3 3  -OLIVER      Time 3 20 sec  -OLIVER         Exercise 4    Exercise Name 4 seated chin tucks  -OLIVER      Cueing 4 Verbal;Auditory  -OLIVER      Sets 4 1  -OLIVER      Reps 4 10  -OLIVER      Time 4 5s  -OLIVER         Exercise 5    Exercise Name 5 scapular retraction  -OLIVER      Cueing 5 Verbal;Demo  -OLIVER      Sets 5 1  -OLIVER      Reps 5 15  -OLIVER      Time 5 5s  -OLIVER         Exercise 6    Exercise Name 6 supine shoulder HA  -OLIVER      Cueing 6 Verbal;Demo  -OLIVER      Sets 6 1  -OLIVER      Reps 6 15  -OLIVER      Time 6 RTB  -OLIVER         Exercise 7    Exercise Name 7 shoulder row/ext  -OLIVER      Cueing 7 Verbal;Demo  -OLIVER      Sets 7 2  -OLIVER      Reps 7 10  -OLIVER      Time 7 RTB  -OLIVER            User Key  (r) = Recorded By, (t) = Taken By, (c) = Cosigned By    Initials Name Provider Type    Tory Messer, PT Physical Therapist                         Manual Rx (last 36 hours)     Manual Treatments     Row Name 04/04/23 0700             Total Minutes    92037 - PT Manual Therapy Minutes 10  -OLIVER         Manual Rx 2    Manual Rx 2 Location STM L UT/LS  -OLIVER         Manual Rx 3    Manual Rx 3 Location cervical distraction, sub occipitial release  -OLIVER         Manual Rx 4    Manual Rx 4 Location passive UT/LS  stretches  -            User Key  (r) = Recorded By, (t) = Taken By, (c) = Cosigned By    Initials Name Provider Type    Tory Messer PT Physical Therapist                 PT OP Goals     Row Name 04/04/23 0700          PT Short Term Goals    STG Date to Achieve 04/27/23  -     STG 1 Pt will report pain rated 2/10 at worst in order to demonstrate ability to return to normalized ADLs and functional activities.  -     STG 1 Progress Ongoing  -     STG 2 Pt will be independent with initial HEP for symptom management.  -     STG 2 Progress Ongoing  -OLIVER        Long Term Goals    LTG Date to Achieve 05/27/23  -     LTG 1 Pt will be independent and compliant with advanced HEP for long term management of symptoms and prevention of future occurrence.  -     LTG 1 Progress Ongoing  -OLIVER     LTG 2 Pt will reduce level of perceived disability as measured by the NDI  to 12% in order to improve QOL.  -     LTG 2 Progress Ongoing  -OLIVER     LTG 3 Pt will report 50% reduction in sleep disturbance in order to improve QOL.  -     LTG 3 Progress Ongoing  -     LTG 4 Pt will be able to drive 1 hour without exacerbation of symptoms in order to increase ease of commute to work.  -     LTG 4 Progress Ongoing  -           User Key  (r) = Recorded By, (t) = Taken By, (c) = Cosigned By    Initials Name Provider Type    Tory Messer PT Physical Therapist                Therapy Education  Education Details: postural awareness, sleep positioning and appropriate timing of completing HEP  Given: HEP, Symptoms/condition management, Pain management, Posture/body mechanics  Program: Reinforced, Progressed  How Provided: Verbal, Demonstration, Written  Provided to: Patient  Level of Understanding: Verbalized, Demonstrated              Time Calculation:   Start Time: 0701  Stop Time: 0744  Time Calculation (min): 43 min  Timed Charges  99114 - PT Therapeutic Exercise Minutes: 33  08932 - PT Manual  Therapy Minutes: 10  Total Minutes  Timed Charges Total Minutes: 43   Total Minutes: 43  Therapy Charges for Today     Code Description Service Date Service Provider Modifiers Qty    73320245544  PT THER PROC EA 15 MIN 4/4/2023 Tory Carlos, PT GP 2    [id number]  PT MANUAL THERAPY EA 15 MIN 4/4/2023 Tory Carlos, PT GP 1                    Tory Carlos, PT  4/4/2023

## 2023-04-06 ENCOUNTER — HOSPITAL ENCOUNTER (OUTPATIENT)
Dept: PHYSICAL THERAPY | Facility: HOSPITAL | Age: 23
Setting detail: THERAPIES SERIES
Discharge: HOME | End: 2023-04-06
Payer: COMMERCIAL

## 2023-04-06 DIAGNOSIS — M54.2 CERVICAL MUSCLE PAIN: Primary | ICD-10-CM

## 2023-04-06 PROCEDURE — 97140 MANUAL THERAPY 1/> REGIONS: CPT

## 2023-04-06 PROCEDURE — 97110 THERAPEUTIC EXERCISES: CPT

## 2023-04-06 NOTE — THERAPY TREATMENT NOTE
Outpatient Physical Therapy Ortho Treatment Note  [facility]     Patient Name: Tran Quintanilla  : 2000  MRN: [id number]  Today's Date: 2023      Visit Date: 2023    Visit Dx:    ICD-10-CM ICD-9-CM   1. Cervical muscle pain  M54.2 723.1       Patient Active Problem List   Diagnosis   • Migraine headache   • IUD (intrauterine device) in place   • Rectal bleeding   • H/O Clostridium difficile infection   • Abdominal pain   • Diarrhea   • Heartburn        Past Medical History:   Diagnosis Date   • Anesthesia     BLOOD PRESSURE DROPPED POST-OP WITH EAR TUBES AGE 4   • Anxiety    • Deviated septum    • History of Clostridium difficile colitis     NUMEROUS ANTIBIOTICS WITH STREP HX   • History of strep sore throat     7 TIMES IN    • Migraines    • Rectal bleeding         Past Surgical History:   Procedure Laterality Date   • COLONOSCOPY N/A 2022    Procedure: COLONOSCOPY TO CECUM AND TERMINAL ILEUM WITH BIOPSIES;  Surgeon: Kaleb Joyner MD;  Location: [facility] ENDOSCOPY;  Service: Gastroenterology;  Laterality: N/A;  PRE- BLOOD IN STOOL  POST- HEMORRHOIDS, ANAL FISSURE   • EAR TUBES     • ENDOSCOPY N/A 2022    Procedure: ESOPHAGOGASTRODUODENOSCOPY WITH BIOPSIES;  Surgeon: Kaleb Joyner MD;  Location: [facility] ENDOSCOPY;  Service: Gastroenterology;  Laterality: N/A;  PRE- ABDOMINAL PAIN  POST- MILD GASTRITIS   • SIGMOIDOSCOPY N/A 2022    Procedure: FLEXIBLE SIGMOIDOSCOPY WITH BIOPSIES;  Surgeon: Baron Ayoub MD;  Location: [facility] ENDOSCOPY;  Service: Gastroenterology;  Laterality: N/A;  Pre: rectal bleeding  Post: hemorrhoids   • TONSILLECTOMY Bilateral 2021    Procedure: TONSILLECTOMY;  Surgeon: Duc Alvarez MD;  Location: [facility] OR;  Service: ENT;  Laterality: Bilateral;   • WISDOM TOOTH EXTRACTION                          PT Assessment/Plan     Row Name 23 1200          PT Assessment    Assessment Comments Ms. Quintanilla returns to PT reporting  maintained slight reduction in muscle tension although experienced migraine headache leading to vision loss the evening of last session. She reports chronic hx of migraines and scheduled to follow up with MD on Monday to discuss medication. She reports headache isolates over R frontal lobe and was fearful of performing HEP due to risk of flaring up symptoms. Increased time spent on manual therapy interventions and suboccipital release. Patient with noted trigger point over R suboccipital and continued tension with L UT/LS. Discussed potential benefit of additional DDN in upcoming sessions. Reviewed gentle postural exercises and continued postural education. Ms. Quintanilla remains an excellent candidate for skilled PT.  -OLIVER        PT Plan    PT Plan Comments response to last session, consider DDN to suboccipitals, UT/LS based on patient reponse, continue to focus on postural exercises, consider diagonals, shoulder flexion theraloop, update HEP  -OLIVER           User Key  (r) = Recorded By, (t) = Taken By, (c) = Cosigned By    Initials Name Provider Type    Tory Messer, PT Physical Therapist                   OP Exercises     Row Name 04/06/23 1100             Subjective Comments    Subjective Comments my symptoms are still doing better. I woke up in the night and did my stretches and that really seemed to help. I am also getting relief with heat  -OLIVER         Total Minutes    31206 - PT Therapeutic Exercise Minutes 22  -OLIVER      38449 - PT Manual Therapy Minutes 18  -OLIVER         Exercise 1    Exercise Name 1 Chin tuck  -OLIVER      Cueing 1 Verbal;Demo  -OLIVER      Reps 1 10  -OLIVER      Time 1 5 sec  -OLIVER      Additional Comments supine  -OLIVER         Exercise 2    Exercise Name 2 SL arcs  -OLIVER      Cueing 2 Verbal;Demo  -OLIVER      Reps 2 15 B  -OLIVER         Exercise 4    Exercise Name 4 seated chin tucks  -OLIVER      Cueing 4 Verbal;Demo  -OLIVER      Sets 4 1  -OLIVER      Reps 4 10  -OLIVER      Time 4 5s  -OLIVER         Exercise 6    Exercise  Name 6 supine shoulder HA  -OLIVER      Cueing 6 Verbal;Demo  -OLIVER      Sets 6 1  -OLIVER      Reps 6 15  -OLIVER      Time 6 RTB  -OLIVER         Exercise 7    Exercise Name 7 shoulder row/ext  -OLIVER      Cueing 7 Verbal;Demo  -OLIVER      Sets 7 2  -OLIVER      Reps 7 10  -OLIVER      Time 7 RTB  -OLIVER            User Key  (r) = Recorded By, (t) = Taken By, (c) = Cosigned By    Initials Name Provider Type    Tory Messer, PT Physical Therapist                         Manual Rx (last 36 hours)     Manual Treatments     Row Name 04/06/23 1100             Total Minutes    76560 - PT Manual Therapy Minutes 18  -OLIVER         Manual Rx 2    Manual Rx 2 Location STM L UT/LS  -OLIVER         Manual Rx 3    Manual Rx 3 Location cervical distraction, sub occipitial release  -OLIVER      Manual Rx 3 Duration inc time with suboccipital release  -OLIVER         Manual Rx 4    Manual Rx 4 Location passive UT/LS stretches  -OLIVER            User Key  (r) = Recorded By, (t) = Taken By, (c) = Cosigned By    Initials Name Provider Type    Tory Messer, PT Physical Therapist                 PT OP Goals     Row Name 04/06/23 1200          PT Short Term Goals    STG Date to Achieve 04/27/23  -OLIVER     STG 1 Pt will report pain rated 2/10 at worst in order to demonstrate ability to return to normalized ADLs and functional activities.  -OLIVER     STG 1 Progress Ongoing  -OLIVER     STG 2 Pt will be independent with initial HEP for symptom management.  -     STG 2 Progress Ongoing  -OLIVER        Long Term Goals    LTG Date to Achieve 05/27/23  -OLIVER     LTG 1 Pt will be independent and compliant with advanced HEP for long term management of symptoms and prevention of future occurrence.  -OLIVER     LTG 1 Progress Ongoing  -OLIVER     LTG 2 Pt will reduce level of perceived disability as measured by the NDI  to 12% in order to improve QOL.  -     LTG 2 Progress Ongoing  -OLIVER     LTG 3 Pt will report 50% reduction in sleep disturbance in order to improve QOL.  -     LTG 3  Progress Ongoing  -     LTG 4 Pt will be able to drive 1 hour without exacerbation of symptoms in order to increase ease of commute to work.  -     LTG 4 Progress Ongoing  -           User Key  (r) = Recorded By, (t) = Taken By, (c) = Cosigned By    Initials Name Provider Type    Tory Messer, PT Physical Therapist                               Time Calculation:   Start Time: 1130  Stop Time: 1210  Time Calculation (min): 40 min  Timed Charges  06310 - PT Therapeutic Exercise Minutes: 22  31927 - PT Manual Therapy Minutes: 18  Total Minutes  Timed Charges Total Minutes: 40   Total Minutes: 40  Therapy Charges for Today     Code Description Service Date Service Provider Modifiers Qty    66075326048 HC PT THER PROC EA 15 MIN 4/6/2023 Tory Carlos, PT GP 2    [id number] HC PT MANUAL THERAPY EA 15 MIN 4/6/2023 Tory Carlos, PT GP 1                    Tory Carlos PT  4/6/2023

## 2023-04-10 ENCOUNTER — APPOINTMENT (OUTPATIENT)
Dept: PHYSICAL THERAPY | Facility: HOSPITAL | Age: 23
End: 2023-04-10
Payer: COMMERCIAL

## 2023-04-10 ENCOUNTER — OFFICE VISIT (OUTPATIENT)
Dept: INTERNAL MEDICINE | Facility: CLINIC | Age: 23
End: 2023-04-10
Payer: COMMERCIAL

## 2023-04-10 VITALS
BODY MASS INDEX: 22.98 KG/M2 | SYSTOLIC BLOOD PRESSURE: 110 MMHG | TEMPERATURE: 97.7 F | DIASTOLIC BLOOD PRESSURE: 70 MMHG | WEIGHT: 143 LBS | HEIGHT: 66 IN

## 2023-04-10 DIAGNOSIS — G43.109 MIGRAINE WITH AURA AND WITHOUT STATUS MIGRAINOSUS, NOT INTRACTABLE: Primary | ICD-10-CM

## 2023-04-10 PROCEDURE — 99214 OFFICE O/P EST MOD 30 MIN: CPT | Performed by: PHYSICIAN ASSISTANT

## 2023-04-10 RX ORDER — ERENUMAB-AOOE 140 MG/ML
140 INJECTION, SOLUTION SUBCUTANEOUS
Qty: 3 ML | Refills: 1 | Status: SHIPPED | OUTPATIENT
Start: 2023-04-10

## 2023-04-10 NOTE — PROGRESS NOTES
Subjective   Chief Complaint   Patient presents with   • Migraine       History of Present Illness      Pt is here today for fup on her migraine headaches. She had an episode of a migraine headache last week and had vision changes that lasted 60-90 min. Migraine headache lasted several hours. She had done PT earlier that morning and is not sure if it caused it.     She saw her allergist and had allergy testing as well. She was started on montelukast.     She has 2 aunts who were just diangosed with breast cancer. They are both her dad's sisters. One in her 60's the other in her 50's.   Patient Active Problem List   Diagnosis   • Migraine headache   • IUD (intrauterine device) in place   • Rectal bleeding   • H/O Clostridium difficile infection   • Abdominal pain   • Diarrhea   • Heartburn       No Known Allergies    Current Outpatient Medications on File Prior to Visit   Medication Sig Dispense Refill   • acetaminophen (TYLENOL) 500 MG tablet Take 2 tablets by mouth Every 6 (Six) Hours As Needed for Mild Pain.     • busPIRone (BUSPAR) 5 MG tablet Take 1 tablet by mouth 3 (Three) Times a Day. (Patient taking differently: Take 1 tablet by mouth Daily As Needed.) 90 tablet 1   • clindamycin (CLEOCIN T) 1 % lotion Apply  topically to the appropriate area as directed Every Morning.     • cyclobenzaprine (FLEXERIL) 5 MG tablet Take 1 tablet by mouth 3 (Three) Times a Day As Needed for Muscle Spasms. 30 tablet 0   • Levonorgestrel (KYLEENA IU) by Intrauterine route.     • montelukast (SINGULAIR) 10 MG tablet Take 1 tablet by mouth every night at bedtime. 30 tablet 11   • polycarbophil 625 MG tablet tablet Take  by mouth Daily.     • promethazine (PHENERGAN) 25 MG tablet Take 1 tablet by mouth Every 6 (Six) Hours As Needed for Nausea or Vomiting. 30 tablet 0   • spironolactone (ALDACTONE) 100 MG tablet Take 1 tablet by mouth Daily. 30 tablet 6   • SUMAtriptan (Imitrex) 50 MG tablet Take one tablet at onset of headache. May  repeat dose one time in 2 hours if headache not relieved. 9 tablet 0   • topiramate (Topamax) 50 MG tablet Take 1 tablet by mouth Daily for 90 days. 180 tablet 1   • tretinoin (RETIN-A) 0.025 % cream Apply 1 application topically to the appropriate area as directed Every Night.     • dicyclomine (Bentyl) 10 MG capsule Take 1 capsule by mouth 4 (Four) Times a Day Before Meals & at Bedtime. (Patient not taking: Reported on 4/10/2023) 90 capsule 0   • [DISCONTINUED] methylPREDNISolone (MEDROL) 4 MG dose pack Take as directed on package instructions. (Patient not taking: Reported on 4/10/2023) 21 tablet 0   • [DISCONTINUED] spironolactone (ALDACTONE) 100 MG tablet Take 100 mg by mouth Every Night. For acne (Patient not taking: Reported on 4/10/2023)       No current facility-administered medications on file prior to visit.       Past Medical History:   Diagnosis Date   • Anesthesia     BLOOD PRESSURE DROPPED POST-OP WITH EAR TUBES AGE 4   • Anxiety    • Deviated septum    • History of Clostridium difficile colitis     NUMEROUS ANTIBIOTICS WITH STREP HX   • History of strep sore throat     7 TIMES IN 2020   • Migraines    • Rectal bleeding        Family History   Problem Relation Age of Onset   • Diabetes Mother    • Cancer Father         Melanoma   • Hyperlipidemia Father    • Hypertension Father    • Epilepsy Father    • Diabetes Maternal Grandmother         CHF & Diabetes   • Cancer Paternal Grandfather         Melanoma   • Migraines Paternal Aunt    • Breast cancer Paternal Aunt    • Breast cancer Paternal Aunt    • Prostate cancer Paternal Uncle    • Leukemia Paternal Uncle    • Malig Hyperthermia Neg Hx    • Colon cancer Neg Hx    • Colon polyps Neg Hx    • Crohn's disease Neg Hx    • Irritable bowel syndrome Neg Hx    • Ulcerative colitis Neg Hx        Social History     Socioeconomic History   • Marital status:    Tobacco Use   • Smoking status: Never   • Smokeless tobacco: Never   • Tobacco comments:      vaping   Vaping Use   • Vaping Use: Former   Substance and Sexual Activity   • Alcohol use: Yes     Comment: Social   • Drug use: Never   • Sexual activity: Yes     Partners: Male     Birth control/protection: Condom, I.U.D., Same-sex partner       Past Surgical History:   Procedure Laterality Date   • COLONOSCOPY N/A 09/01/2022    Procedure: COLONOSCOPY TO CECUM AND TERMINAL ILEUM WITH BIOPSIES;  Surgeon: Kaleb Joyner MD;  Location: [facility] ENDOSCOPY;  Service: Gastroenterology;  Laterality: N/A;  PRE- BLOOD IN STOOL  POST- HEMORRHOIDS, ANAL FISSURE   • EAR TUBES     • ENDOSCOPY N/A 09/01/2022    Procedure: ESOPHAGOGASTRODUODENOSCOPY WITH BIOPSIES;  Surgeon: Kaleb Joyner MD;  Location: [facility] ENDOSCOPY;  Service: Gastroenterology;  Laterality: N/A;  PRE- ABDOMINAL PAIN  POST- MILD GASTRITIS   • SIGMOIDOSCOPY N/A 03/11/2022    Procedure: FLEXIBLE SIGMOIDOSCOPY WITH BIOPSIES;  Surgeon: Baron Ayoub MD;  Location: [facility] ENDOSCOPY;  Service: Gastroenterology;  Laterality: N/A;  Pre: rectal bleeding  Post: hemorrhoids   • TONSILLECTOMY Bilateral 07/13/2021    Procedure: TONSILLECTOMY;  Surgeon: Duc Alvarez MD;  Location: [facility] MAIN OR;  Service: ENT;  Laterality: Bilateral;   • WISDOM TOOTH EXTRACTION         The following portions of the patient's history were reviewed and updated as appropriate: problem list, allergies, current medications, past medical history, past family history, past social history and past surgical history.    ROS     See HPI    Immunization History   Administered Date(s) Administered   • COVID-19 (PFIZER) PURPLE CAP 12/22/2020, 01/12/2021   • DTaP 2000, 2000, 01/21/1999, 01/13/2000, 07/02/2002   • Flu Vaccine Intradermal Quad 18-64YR 10/23/2007, 10/30/2015   • Flu Vaccine Split Quad 02/28/2017, 10/06/2017, 09/20/2018, 09/24/2019   • FluLaval/Fluzone >6mos 02/28/2017, 10/06/2017   • Fluzone Quad >6mos (Multi-dose) 10/24/2001   • HPV Quadrivalent 07/13/2009,  "09/14/2009, 01/15/2010   • Hep A / Hep B 01/02/2018, 02/07/2018, 07/12/2018   • Hep A, 2 Dose 10/24/2008, 07/13/2009, 07/31/2009   • Hep B, Adolescent or Pediatric 1998, 1998, 01/21/1999   • Hib (HbOC) 1998, 1998, 01/21/1999, 01/13/2000   • Hib (PRP-OMP) 1998, 1998, 01/21/1999, 01/13/2000   • Hpv9 11/09/2017   • IPV 1998, 1998, 01/13/2000, 07/12/2002   • Influenza LAIV (Nasal) 10/24/2008   • Influenza Quad Vaccine (Inpatient) 10/24/2001   • MMR 10/11/1999, 07/12/2002   • Meningococcal MCV4P (Menactra) 09/14/2009, 11/09/2017   • OPV 1998, 1998, 01/13/2000   • PPD Test 02/28/2017, 11/09/2017   • Pneumococcal Conjugate 13-Valent (PCV13) 11/22/2000   • Tdap 07/13/2009, 12/01/2017   • Varicella 07/15/1999, 01/13/2000, 10/24/2008, 02/07/2018       Objective   Vitals:    04/10/23 1543   BP: 110/70   Temp: 97.7 °F (36.5 °C)   Weight: 64.9 kg (143 lb)   Height: 167.6 cm (65.98\")     Body mass index is 23.09 kg/m².  Physical Exam  Vitals reviewed.   Constitutional:       Appearance: Normal appearance.   HENT:      Head: Normocephalic and atraumatic.   Cardiovascular:      Rate and Rhythm: Normal rate and regular rhythm.   Neurological:      Mental Status: She is alert.         Assessment & Plan   Diagnoses and all orders for this visit:    1. Migraine with aura and without status migrainosus, not intractable (Primary)  -     Erenumab-aooe (Aimovig) 140 MG/ML auto-injector; Inject 1 mL under the skin into the appropriate area as directed 1 (One) Time for 1 dose.  Dispense: 3 mL; Refill: 1  -     Magnesium  -     Vitamin B12    Add Aimovig for migraine prevention. Check magnesium and B12 as well. Keep headache log as well. She is going to discuss with her dad about whether he will do genetic testing. Pending his results whether she will need it.     Return in about 3 months (around 7/10/2023) for Lab Today.           "

## 2023-04-11 ENCOUNTER — HOSPITAL ENCOUNTER (OUTPATIENT)
Dept: PHYSICAL THERAPY | Facility: HOSPITAL | Age: 23
Setting detail: THERAPIES SERIES
Discharge: HOME | End: 2023-04-11

## 2023-04-11 DIAGNOSIS — M54.2 CERVICAL MUSCLE PAIN: Primary | ICD-10-CM

## 2023-04-11 DIAGNOSIS — R53.1 WEAKNESS: ICD-10-CM

## 2023-04-11 DIAGNOSIS — M25.551 RIGHT HIP PAIN: ICD-10-CM

## 2023-04-11 NOTE — THERAPY TREATMENT NOTE
Outpatient Physical Therapy Ortho Treatment Note  [facility]     Patient Name: Tran Quintanilla  : 2000  MRN: [id number]  Today's Date: 2023      Visit Date: 2023    Visit Dx:    ICD-10-CM ICD-9-CM   1. Cervical muscle pain  M54.2 723.1   2. Right hip pain  M25.551 719.45   3. Weakness  R53.1 780.79       Patient Active Problem List   Diagnosis   • Migraine headache   • IUD (intrauterine device) in place   • Rectal bleeding   • H/O Clostridium difficile infection   • Abdominal pain   • Diarrhea   • Heartburn        Past Medical History:   Diagnosis Date   • Anesthesia     BLOOD PRESSURE DROPPED POST-OP WITH EAR TUBES AGE 4   • Anxiety    • Deviated septum    • History of Clostridium difficile colitis     NUMEROUS ANTIBIOTICS WITH STREP HX   • History of strep sore throat     7 TIMES IN    • Migraines    • Rectal bleeding         Past Surgical History:   Procedure Laterality Date   • COLONOSCOPY N/A 2022    Procedure: COLONOSCOPY TO CECUM AND TERMINAL ILEUM WITH BIOPSIES;  Surgeon: Kaleb Joyner MD;  Location: [facility] ENDOSCOPY;  Service: Gastroenterology;  Laterality: N/A;  PRE- BLOOD IN STOOL  POST- HEMORRHOIDS, ANAL FISSURE   • EAR TUBES     • ENDOSCOPY N/A 2022    Procedure: ESOPHAGOGASTRODUODENOSCOPY WITH BIOPSIES;  Surgeon: Kaleb Joyner MD;  Location: [facility] ENDOSCOPY;  Service: Gastroenterology;  Laterality: N/A;  PRE- ABDOMINAL PAIN  POST- MILD GASTRITIS   • SIGMOIDOSCOPY N/A 2022    Procedure: FLEXIBLE SIGMOIDOSCOPY WITH BIOPSIES;  Surgeon: Baron Ayoub MD;  Location: [facility] ENDOSCOPY;  Service: Gastroenterology;  Laterality: N/A;  Pre: rectal bleeding  Post: hemorrhoids   • TONSILLECTOMY Bilateral 2021    Procedure: TONSILLECTOMY;  Surgeon: Duc Alvarez MD;  Location: [facility] MAIN OR;  Service: ENT;  Laterality: Bilateral;   • WISDOM TOOTH EXTRACTION                          PT Assessment/Plan     Row Name 23 1600          PT  Assessment    Assessment Comments Tran returns to PT reporting slight improvement in symptoms in comparison to start of PT and continues to have relief in muscle tension with stretches and previous bout of DDN. Continued with addition DDN session to L UT/LS and she demo multiple moderate-large LTRs and immediate relief in symptoms. DDN also performed to B suboccipitals and patient reports reproduction in headache referral. Encouraged increase water intake and various adjustment to HEP. She remains an excellent candidate for skilled PT.  -OLIVER        PT Plan    PT Plan Comments response to try needling, continue with postral strengthening, qped chin tucks  -OLIVER           User Key  (r) = Recorded By, (t) = Taken By, (c) = Cosigned By    Initials Name Provider Type    Tory Messer, PT Physical Therapist                               Manual Rx (last 36 hours)     Manual Treatments     Row Name 04/11/23 1500             Manual Rx 1    Manual Rx 1 Location intramuscular manual therapy  -      Manual Rx 1 Type dry-needling  -      Manual Rx 1 Grade 30  -OLIVER    Tran Quintanilla was assessed for dry-needling and intramuscular manual therapy. Discussed risks/benefits of Dry Needling with patient, including but not limited to muscle soreness, bruising, vasovagal response, pneumothorax, nerve injury. Patient [previously signed written consent to proceed with treatment.  Patient position in R SLing during treatment and L UT/LS muscles treated. Patient responded with multiple mild-moderate LTRs and immediate decrease in L sided muscle tension. Utilized 30-50mm needles. Also treated B suboccipitals and patient responded with R  Frontal lobe headache referral. Needles left in place for 10 minutes. Patient without adverse events. Utilized palpation before, during, and after to facilitate assessment for trigger points and musclar integrity. Clean needle technique observed at all times, precautions for lung fields,  neurovascular structures observed.             User Key  (r) = Recorded By, (t) = Taken By, (c) = Cosigned By    Initials Name Provider Type    Tory Messer, PT Physical Therapist                                   Time Calculation:   Start Time: 1615  Stop Time: 1645  Time Calculation (min): 30 min  Therapy Charges for Today     Code Description Service Date Service Provider Modifiers Qty    08876892901  PT SELF PAY DRY NEEDLING SESSION 4/11/2023 Tory Carlos, PT  1                    Tory Carlos, PT  4/11/2023

## 2023-04-12 DIAGNOSIS — E53.8 B12 DEFICIENCY: Primary | ICD-10-CM

## 2023-04-12 DIAGNOSIS — Z80.3 FAMILY HISTORY OF BREAST CANCER: Primary | ICD-10-CM

## 2023-04-12 DIAGNOSIS — Z80.42 FAMILY HISTORY OF PROSTATE CANCER: ICD-10-CM

## 2023-04-12 LAB
MAGNESIUM SERPL-MCNC: 2.1 MG/DL (ref 1.6–2.3)
VIT B12 SERPL-MCNC: 281 PG/ML (ref 232–1245)

## 2023-04-12 RX ORDER — CYANOCOBALAMIN 1000 UG/ML
1000 INJECTION, SOLUTION INTRAMUSCULAR; SUBCUTANEOUS
Qty: 10 ML | Refills: 1 | Status: SHIPPED | OUTPATIENT
Start: 2023-04-12

## 2023-04-14 ENCOUNTER — HOSPITAL ENCOUNTER (OUTPATIENT)
Dept: PHYSICAL THERAPY | Facility: HOSPITAL | Age: 23
Setting detail: THERAPIES SERIES
Discharge: HOME | End: 2023-04-14
Payer: COMMERCIAL

## 2023-04-14 DIAGNOSIS — M54.2 CERVICAL MUSCLE PAIN: Primary | ICD-10-CM

## 2023-04-14 PROCEDURE — 97110 THERAPEUTIC EXERCISES: CPT

## 2023-04-14 PROCEDURE — 97140 MANUAL THERAPY 1/> REGIONS: CPT

## 2023-04-14 NOTE — THERAPY TREATMENT NOTE
Outpatient Physical Therapy Ortho Treatment Note  [facility]     Patient Name: Tran Quintanilla  : 2000  MRN: [id number]  Today's Date: 2023      Visit Date: 2023    Visit Dx:    ICD-10-CM ICD-9-CM   1. Cervical muscle pain  M54.2 723.1       Patient Active Problem List   Diagnosis   • Migraine headache   • IUD (intrauterine device) in place   • Rectal bleeding   • H/O Clostridium difficile infection   • Abdominal pain   • Diarrhea   • Heartburn        Past Medical History:   Diagnosis Date   • Anesthesia     BLOOD PRESSURE DROPPED POST-OP WITH EAR TUBES AGE 4   • Anxiety    • Deviated septum    • History of Clostridium difficile colitis     NUMEROUS ANTIBIOTICS WITH STREP HX   • History of strep sore throat     7 TIMES IN    • Migraines    • Rectal bleeding         Past Surgical History:   Procedure Laterality Date   • COLONOSCOPY N/A 2022    Procedure: COLONOSCOPY TO CECUM AND TERMINAL ILEUM WITH BIOPSIES;  Surgeon: Kaleb Joyner MD;  Location: [facility] ENDOSCOPY;  Service: Gastroenterology;  Laterality: N/A;  PRE- BLOOD IN STOOL  POST- HEMORRHOIDS, ANAL FISSURE   • EAR TUBES     • ENDOSCOPY N/A 2022    Procedure: ESOPHAGOGASTRODUODENOSCOPY WITH BIOPSIES;  Surgeon: Kaleb Joyner MD;  Location: [facility] ENDOSCOPY;  Service: Gastroenterology;  Laterality: N/A;  PRE- ABDOMINAL PAIN  POST- MILD GASTRITIS   • SIGMOIDOSCOPY N/A 2022    Procedure: FLEXIBLE SIGMOIDOSCOPY WITH BIOPSIES;  Surgeon: Baron Ayoub MD;  Location: [facility] ENDOSCOPY;  Service: Gastroenterology;  Laterality: N/A;  Pre: rectal bleeding  Post: hemorrhoids   • TONSILLECTOMY Bilateral 2021    Procedure: TONSILLECTOMY;  Surgeon: Duc Alvarez MD;  Location: [facility] MAIN OR;  Service: ENT;  Laterality: Bilateral;   • WISDOM TOOTH EXTRACTION                          PT Assessment/Plan     Row Name 23 0700          PT Assessment    Assessment Comments Ms. Quintanilla arrives to PT reporting  positive response to last bout of DDN and denies increase in headaches. She has recently began B12 injections. Continued with deep neck flexor and postural strengthening with good tolerance. Added qped chin tucks, qped alt shoulder flexion with chin tucks, seated chin tuck turns and supine shoulder diagonals. Large blue noodle added during supine HA/diagonals and patient benefits from tactile feedback. Updated HEP accordingly and reviewed changes. Ms. Quintanilla remains a candidate for skilled PT.  -OLIVER        PT Plan    PT Plan Comments response to last session. frequency of headaches, shoulder flexion theraloop, chin tuck OH press?  -OLIVER           User Key  (r) = Recorded By, (t) = Taken By, (c) = Cosigned By    Initials Name Provider Type    Tory Messer, PT Physical Therapist                   OP Exercises     Row Name 04/14/23 0700             Subjective Comments    Subjective Comments I did not have a inc in headache but I do feel like the tension is improving. I am now taking daily B12 shots  -OLIVER         Total Minutes    44409 - PT Therapeutic Exercise Minutes 35  -OLIVER      75250 - PT Manual Therapy Minutes 10  -OLIVER         Exercise 2    Exercise Name 2 SL arcs  -OLIVER      Cueing 2 Verbal;Demo  -OLIVER      Reps 2 15 B  -OLIVER         Exercise 4    Exercise Name 4 seated chin tucks  -OLIVER      Cueing 4 Verbal;Demo  -OLIVER      Sets 4 1  -OLIVER      Reps 4 10  -OLIVER      Time 4 5s  -OLIVER         Exercise 5    Exercise Name 5 supine shoulder diagonals  -OLIVER      Cueing 5 Verbal;Demo  -OLIVER      Sets 5 1  -OLIVER      Reps 5 15e  -OLIVER      Time 5 RTB  -OLIVER      Additional Comments blue noodle  -OLIVER         Exercise 6    Exercise Name 6 supine shoulder HA  -OLIVER      Cueing 6 Verbal;Demo  -OLIVER      Sets 6 1  -OLIVER      Reps 6 15  -OLIVER      Time 6 RTB  -OLIVER      Additional Comments blue noodle  -OLIVER         Exercise 7    Exercise Name 7 shoulder row/ext  -OLIVER      Cueing 7 Verbal;Demo  -OLIVER      Sets 7 2  -OLIVER      Reps 7 --  -OLIVER      Time 7 RTB  -OLIVER          Exercise 8    Exercise Name 8 qped chin tucks  -OLIVER      Cueing 8 Verbal;Demo  -OLIVER      Sets 8 1  -OLIVER      Reps 8 10  -OLIVER      Time 8 5s  -OLIVER         Exercise 9    Exercise Name 9 qped alt shoulder flexion with chin tuck  -OLIVER      Cueing 9 Verbal;Demo  -OLIVER      Sets 9 1  -OLIVER      Reps 9 10e  -OLIVER         Exercise 10    Exercise Name 10 seated chin tuck rot  -OLIVER      Cueing 10 Verbal;Demo  -OLIVER      Sets 10 1  -OLIVER      Reps 10 10  -OLIVER      Time 10 5s  -OLIVER            User Key  (r) = Recorded By, (t) = Taken By, (c) = Cosigned By    Initials Name Provider Type    Tory Messer, PT Physical Therapist                         Manual Rx (last 36 hours)     Manual Treatments     Row Name 04/14/23 0700             Total Minutes    33538 - PT Manual Therapy Minutes 10  -OLIVER         Manual Rx 2    Manual Rx 2 Location STM L UT/LS  -OLIVER         Manual Rx 3    Manual Rx 3 Location cervical distraction, sub occipitial release  -OLIVER      Manual Rx 3 Duration inc time with suboccipital release  -OLIVER            User Key  (r) = Recorded By, (t) = Taken By, (c) = Cosigned By    Initials Name Provider Type    Tory Messer, PT Physical Therapist                 PT OP Goals     Row Name 04/14/23 0700          PT Short Term Goals    STG Date to Achieve 04/27/23  -     STG 1 Pt will report pain rated 2/10 at worst in order to demonstrate ability to return to normalized ADLs and functional activities.  -     STG 1 Progress Ongoing  -     STG 2 Pt will be independent with initial HEP for symptom management.  -     STG 2 Progress Ongoing  -        Long Term Goals    LTG Date to Achieve 05/27/23  -     LTG 1 Pt will be independent and compliant with advanced HEP for long term management of symptoms and prevention of future occurrence.  -     LTG 1 Progress Ongoing  -     LTG 2 Pt will reduce level of perceived disability as measured by the NDI  to 12% in order to improve QOL.  -     LTG 2 Progress  Ongoing  -     LTG 3 Pt will report 50% reduction in sleep disturbance in order to improve QOL.  -     LTG 3 Progress Ongoing  -     LTG 4 Pt will be able to drive 1 hour without exacerbation of symptoms in order to increase ease of commute to work.  -     LTG 4 Progress Ongoing  -           User Key  (r) = Recorded By, (t) = Taken By, (c) = Cosigned By    Initials Name Provider Type    Tory Messer, PT Physical Therapist                Therapy Education  Education Details: updated HEP  Given: HEP, Symptoms/condition management, Pain management, Posture/body mechanics  Program: Reinforced, Progressed  How Provided: Verbal, Demonstration, Written  Provided to: Patient  Level of Understanding: Teach back education performed, Verbalized, Demonstrated              Time Calculation:   Start Time: 0701  Stop Time: 0746  Time Calculation (min): 45 min  Timed Charges  96036 - PT Therapeutic Exercise Minutes: 35  04260 - PT Manual Therapy Minutes: 10  Total Minutes  Timed Charges Total Minutes: 45   Total Minutes: 45  Therapy Charges for Today     Code Description Service Date Service Provider Modifiers Qty    [id number]  PT THER PROC EA 15 MIN 4/14/2023 Tory Carlos, PT GP 2    [id number] HC PT MANUAL THERAPY EA 15 MIN 4/14/2023 Tory Carlos, PT GP 1                    Tory Carlos PT  4/14/2023

## 2023-04-19 ENCOUNTER — HOSPITAL ENCOUNTER (OUTPATIENT)
Dept: PHYSICAL THERAPY | Facility: HOSPITAL | Age: 23
Setting detail: THERAPIES SERIES
Discharge: HOME | End: 2023-04-19
Payer: COMMERCIAL

## 2023-04-19 DIAGNOSIS — M54.2 CERVICAL MUSCLE PAIN: Primary | ICD-10-CM

## 2023-04-19 PROCEDURE — 97110 THERAPEUTIC EXERCISES: CPT

## 2023-04-19 NOTE — THERAPY TREATMENT NOTE
Outpatient Physical Therapy Ortho Treatment Note  [facility]     Patient Name: Tran Quintanilla  : 2000  MRN: [id number]  Today's Date: 2023      Visit Date: 2023    Visit Dx:    ICD-10-CM ICD-9-CM   1. Cervical muscle pain  M54.2 723.1       Patient Active Problem List   Diagnosis   • Migraine headache   • IUD (intrauterine device) in place   • Rectal bleeding   • H/O Clostridium difficile infection   • Abdominal pain   • Diarrhea   • Heartburn        Past Medical History:   Diagnosis Date   • Anesthesia     BLOOD PRESSURE DROPPED POST-OP WITH EAR TUBES AGE 4   • Anxiety    • Deviated septum    • History of Clostridium difficile colitis     NUMEROUS ANTIBIOTICS WITH STREP HX   • History of strep sore throat     7 TIMES IN    • Migraines    • Rectal bleeding         Past Surgical History:   Procedure Laterality Date   • COLONOSCOPY N/A 2022    Procedure: COLONOSCOPY TO CECUM AND TERMINAL ILEUM WITH BIOPSIES;  Surgeon: Kaleb Joyner MD;  Location: [facility] ENDOSCOPY;  Service: Gastroenterology;  Laterality: N/A;  PRE- BLOOD IN STOOL  POST- HEMORRHOIDS, ANAL FISSURE   • EAR TUBES     • ENDOSCOPY N/A 2022    Procedure: ESOPHAGOGASTRODUODENOSCOPY WITH BIOPSIES;  Surgeon: Kaleb Joyner MD;  Location: [facility] ENDOSCOPY;  Service: Gastroenterology;  Laterality: N/A;  PRE- ABDOMINAL PAIN  POST- MILD GASTRITIS   • SIGMOIDOSCOPY N/A 2022    Procedure: FLEXIBLE SIGMOIDOSCOPY WITH BIOPSIES;  Surgeon: Baron Ayoub MD;  Location: [facility] ENDOSCOPY;  Service: Gastroenterology;  Laterality: N/A;  Pre: rectal bleeding  Post: hemorrhoids   • TONSILLECTOMY Bilateral 2021    Procedure: TONSILLECTOMY;  Surgeon: Duc Alvarez MD;  Location: [facility] MAIN OR;  Service: ENT;  Laterality: Bilateral;   • WISDOM TOOTH EXTRACTION                          PT Assessment/Plan     Row Name 23 0700          PT Assessment    Assessment Comments Tran continues to make progress  towards goals and arrives reporting improvement in muscle tension and headache intensity. She expresses headaches are no longer daily and her sleep quality has significantly improved. She reports onset of soreness/discomfort two days ago that improved with HEP and self STM. Continued reviewing postural strengthening with various resistance/repetition progressions within current exercises. Added door stretch and supine shoulder flexion theraloop with good tolerance. No changes made to HEP at this time. Ms. Quintanilla remains a candidate for skilled PT.  -OLIVER        PT Plan    PT Plan Comments response to last session, progress supine to standing (diagonals/HA), incline push up +  -OLIVER           User Key  (r) = Recorded By, (t) = Taken By, (c) = Cosigned By    Initials Name Provider Type    Tory Mseser, PT Physical Therapist                   OP Exercises     Row Name 04/19/23 0700             Subjective Comments    Subjective Comments I had some soreness two days ago but I did my HEP and my  massaged the area and it helped. My headaches are less intense  -OLIVER         Total Minutes    78308 - PT Therapeutic Exercise Minutes 38  -OLIVER      20084 - PT Manual Therapy Minutes 5  -OLIVER         Exercise 2    Exercise Name 2 standing thoracic rotation  -OLIVER      Cueing 2 Verbal;Demo  -OLIVER      Sets 2 1  -OLIVER      Reps 2 15e  -OLIVER      Time 2 RTB  -OLIVER         Exercise 3    Exercise Name 3 doorway stretch  -OLIVER      Cueing 3 Verbal;Demo  -OLIVER      Reps 3 3  -OLIVER      Time 3 20s  -OLIVER         Exercise 5    Exercise Name 5 supine shoulder diagonals  -OLIVER      Cueing 5 Verbal;Demo  -OLIVER      Sets 5 2  -OLIVER      Reps 5 12e  -OLIVER      Time 5 RTB  -OLIVER      Additional Comments blue noodle  -OLIVER         Exercise 6    Exercise Name 6 supine shoulder HA  -OLIVER      Cueing 6 Verbal;Demo  -OLIVER      Sets 6 2  -OLIVER      Reps 6 12  -OLIVER      Time 6 RTB  -OLIVER      Additional Comments blue noodle  -OLIVER         Exercise 7    Exercise Name 7 shoulder  row/ext  -OLIVER      Cueing 7 Verbal;Demo  -OLIVER      Sets 7 2  -OLIVER      Reps 7 10  -OLIVER      Time 7 GTB  -OLIVER         Exercise 8    Exercise Name 8 qped chin tucks  -OLIVER      Cueing 8 Verbal;Demo  -OLIVER      Sets 8 1  -OLIVER      Reps 8 10  -OLIVER      Time 8 5s  -OLIVER         Exercise 10    Exercise Name 10 qped chin tuck rot  -OLIVER      Cueing 10 Verbal;Demo  -OLIVER      Sets 10 1  -OLIVER      Reps 10 10  -OLIVER      Time 10 5s  -OLIVER         Exercise 11    Exercise Name 11 supine shoulder flexion theraloop  -OLIVER      Cueing 11 Verbal;Demo  -OLIVER      Sets 11 1  -OLIVER      Reps 11 10  -OLIVER      Time 11 RTB  -OLIVER            User Key  (r) = Recorded By, (t) = Taken By, (c) = Cosigned By    Initials Name Provider Type    Tory Messer, PT Physical Therapist                         Manual Rx (last 36 hours)     Manual Treatments     Row Name 04/19/23 0700             Total Minutes    88245 - PT Manual Therapy Minutes 5  -OLIVER         Manual Rx 2    Manual Rx 2 Location STM L UT/LS  -OLIVER         Manual Rx 3    Manual Rx 3 Location cervical distraction, sub occipitial release  -OLIVER      Manual Rx 3 Duration inc time with suboccipital release  -OLIVER            User Key  (r) = Recorded By, (t) = Taken By, (c) = Cosigned By    Initials Name Provider Type    Tory Messer, PT Physical Therapist                 PT OP Goals     Row Name 04/19/23 0700          PT Short Term Goals    STG Date to Achieve 04/27/23  -     STG 1 Pt will report pain rated 2/10 at worst in order to demonstrate ability to return to normalized ADLs and functional activities.  -     STG 1 Progress Ongoing  -     STG 2 Pt will be independent with initial HEP for symptom management.  -     STG 2 Progress Met  -        Long Term Goals    LTG Date to Achieve 05/27/23  -     LTG 1 Pt will be independent and compliant with advanced HEP for long term management of symptoms and prevention of future occurrence.  -     LTG 1 Progress Ongoing  -     LTG 2 Pt will  reduce level of perceived disability as measured by the NDI  to 12% in order to improve QOL.  -OLIVER     LTG 2 Progress Ongoing  -OLIVER     LTG 3 Pt will report 50% reduction in sleep disturbance in order to improve QOL.  -OLIVER     LTG 3 Progress Ongoing  -OLIVER     LTG 4 Pt will be able to drive 1 hour without exacerbation of symptoms in order to increase ease of commute to work.  -OLIVER     LTG 4 Progress Ongoing  -OLIVER           User Key  (r) = Recorded By, (t) = Taken By, (c) = Cosigned By    Initials Name Provider Type    Tory Messer, PT Physical Therapist                               Time Calculation:   Start Time: 0700  Stop Time: 0743  Time Calculation (min): 43 min  Timed Charges  95621 - PT Therapeutic Exercise Minutes: 38  46847 - PT Manual Therapy Minutes: 5  Total Minutes  Timed Charges Total Minutes: 43   Total Minutes: 43  Therapy Charges for Today     Code Description Service Date Service Provider Modifiers Qty    77324973665 HC PT THER PROC EA 15 MIN 4/19/2023 Tory Carlos, PT GP 3                    Tory Carlos PT  4/19/2023

## 2023-04-26 ENCOUNTER — HOSPITAL ENCOUNTER (OUTPATIENT)
Dept: PHYSICAL THERAPY | Facility: HOSPITAL | Age: 23
Setting detail: THERAPIES SERIES
Discharge: HOME | End: 2023-04-26
Payer: COMMERCIAL

## 2023-04-26 DIAGNOSIS — M54.2 CERVICAL MUSCLE PAIN: Primary | ICD-10-CM

## 2023-04-26 PROCEDURE — 97110 THERAPEUTIC EXERCISES: CPT

## 2023-04-26 PROCEDURE — 97140 MANUAL THERAPY 1/> REGIONS: CPT

## 2023-04-26 NOTE — THERAPY TREATMENT NOTE
Outpatient Physical Therapy Ortho Treatment Note  [facility]     Patient Name: Tran Quintanilla  : 2000  MRN: [id number]  Today's Date: 2023      Visit Date: 2023    Visit Dx:    ICD-10-CM ICD-9-CM   1. Cervical muscle pain  M54.2 723.1       Patient Active Problem List   Diagnosis   • Migraine headache   • IUD (intrauterine device) in place   • Rectal bleeding   • H/O Clostridium difficile infection   • Abdominal pain   • Diarrhea   • Heartburn        Past Medical History:   Diagnosis Date   • Anesthesia     BLOOD PRESSURE DROPPED POST-OP WITH EAR TUBES AGE 4   • Anxiety    • Deviated septum    • History of Clostridium difficile colitis     NUMEROUS ANTIBIOTICS WITH STREP HX   • History of strep sore throat     7 TIMES IN    • Migraines    • Rectal bleeding         Past Surgical History:   Procedure Laterality Date   • COLONOSCOPY N/A 2022    Procedure: COLONOSCOPY TO CECUM AND TERMINAL ILEUM WITH BIOPSIES;  Surgeon: Kaleb Joyner MD;  Location: [facility] ENDOSCOPY;  Service: Gastroenterology;  Laterality: N/A;  PRE- BLOOD IN STOOL  POST- HEMORRHOIDS, ANAL FISSURE   • EAR TUBES     • ENDOSCOPY N/A 2022    Procedure: ESOPHAGOGASTRODUODENOSCOPY WITH BIOPSIES;  Surgeon: Kaleb Joyner MD;  Location: [facility] ENDOSCOPY;  Service: Gastroenterology;  Laterality: N/A;  PRE- ABDOMINAL PAIN  POST- MILD GASTRITIS   • SIGMOIDOSCOPY N/A 2022    Procedure: FLEXIBLE SIGMOIDOSCOPY WITH BIOPSIES;  Surgeon: Baron Ayoub MD;  Location: [facility] ENDOSCOPY;  Service: Gastroenterology;  Laterality: N/A;  Pre: rectal bleeding  Post: hemorrhoids   • TONSILLECTOMY Bilateral 2021    Procedure: TONSILLECTOMY;  Surgeon: Duc Alvarez MD;  Location: [facility] OR;  Service: ENT;  Laterality: Bilateral;   • WISDOM TOOTH EXTRACTION                          PT Assessment/Plan     Row Name 23 1500          PT Assessment    Assessment Comments Ms. Quintanilla arrives to PT reporting  increased muscle tension this date after attending concert and sleeping in different bed. She was unable to perform her HEP last night and feels this is directly related to her flare up. Reviewed previously performed exercises with no new exercises added. Focused on manual therapy to reduce muscle tension found within suboccipitals, L UT/LS and middle trap/rhomboids. She remains a candidate for skilled PT.  -OLIVER        PT Plan    PT Plan Comments DDN as needed, response to last session, consider push up at incline and shoudler flexion theraloop with drivers  -OLIVER           User Key  (r) = Recorded By, (t) = Taken By, (c) = Cosigned By    Initials Name Provider Type    Tory Messer, PT Physical Therapist                   OP Exercises     Row Name 04/26/23 1500             Subjective Comments    Subjective Comments I am very sore today after going to a concert  -OLIVER         Total Minutes    74687 - PT Therapeutic Exercise Minutes 35  -OLIVER      04079 - PT Manual Therapy Minutes 10  -OLIVER         Exercise 5    Exercise Name 5 supine shoulder diagonals  -OLIVER      Cueing 5 Verbal;Demo  -OLIVER      Sets 5 2  -OLIVER      Reps 5 12e  -OLIVER      Time 5 GTB  -OLIVER      Additional Comments blue noodle  -OLIVER         Exercise 6    Exercise Name 6 supine shoulder HA  -OLIVER      Cueing 6 Verbal;Demo  -OLIVER      Sets 6 2  -OLIVER      Reps 6 12  -OLIVER      Time 6 GTB  -OLIVER      Additional Comments blue noodle  -OLIVER         Exercise 7    Exercise Name 7 shoulder row/ext  -OLIVER      Cueing 7 Verbal;Demo  -OLIVER      Sets 7 2  -OLIVER      Reps 7 10  -OLIVER      Time 7 GTB  -OLIVER         Exercise 8    Exercise Name 8 qped chin tucks  -OLIVER      Cueing 8 Verbal;Demo  -OLIVER      Sets 8 1  -OLIVER      Reps 8 15  -OLIEVR      Time 8 5s  -OLIVER         Exercise 9    Exercise Name 9 seated UT/LS stretch  -OLIVER      Cueing 9 Verbal;Demo  -OLIVER      Reps 9 3  -OLIVER      Time 9 20s  -OLIVER         Exercise 10    Exercise Name 10 qped chin tuck rot  -OLIVER      Cueing 10 Verbal;Demo  -OLIVER      Sets  10 1  -OLIVER      Reps 10 15  -OLIVER      Time 10 5s  -OLIVER         Exercise 11    Exercise Name 11 supine shoulder flexion theraloop  -      Cueing 11 Verbal;Demo  -OLIVER      Sets 11 1  -OLIVER      Reps 11 10  -OLIVER      Time 11 RTB  -OLIVER            User Key  (r) = Recorded By, (t) = Taken By, (c) = Cosigned By    Initials Name Provider Type    Tory Messer, PT Physical Therapist                         Manual Rx (last 36 hours)     Manual Treatments     Row Name 04/26/23 1500             Total Minutes    10686 - PT Manual Therapy Minutes 10  -OLIVER         Manual Rx 2    Manual Rx 2 Location STM L UT/LS/ middle trap and rhomboid  -OLIVER         Manual Rx 3    Manual Rx 3 Location cervical distraction, sub occipitial release  -OLIVER      Manual Rx 3 Duration inc time with suboccipital release  -OLIVER            User Key  (r) = Recorded By, (t) = Taken By, (c) = Cosigned By    Initials Name Provider Type    Tory Messer, PT Physical Therapist                 PT OP Goals     Row Name 04/26/23 1500          PT Short Term Goals    STG Date to Achieve 04/27/23  -     STG 1 Pt will report pain rated 2/10 at worst in order to demonstrate ability to return to normalized ADLs and functional activities.  -     STG 1 Progress Ongoing  -     STG 2 Pt will be independent with initial HEP for symptom management.  -     STG 2 Progress Met  -        Long Term Goals    LTG Date to Achieve 05/27/23  -     LTG 1 Pt will be independent and compliant with advanced HEP for long term management of symptoms and prevention of future occurrence.  -     LTG 1 Progress Ongoing  -     LTG 2 Pt will reduce level of perceived disability as measured by the NDI  to 12% in order to improve QOL.  -     LTG 2 Progress Ongoing  -     LTG 3 Pt will report 50% reduction in sleep disturbance in order to improve QOL.  -     LTG 3 Progress Ongoing  -     LTG 4 Pt will be able to drive 1 hour without exacerbation of symptoms in  order to increase ease of commute to work.  -OLIVER     LTG 4 Progress Ongoing  -OLIVER           User Key  (r) = Recorded By, (t) = Taken By, (c) = Cosigned By    Initials Name Provider Type    Tory Messer, PT Physical Therapist                Therapy Education  Education Details: condensed and reprinted HEP              Time Calculation:   Start Time: 1530  Stop Time: 1615  Time Calculation (min): 45 min  Timed Charges  17083 - PT Therapeutic Exercise Minutes: 35  75887 - PT Manual Therapy Minutes: 10  Total Minutes  Timed Charges Total Minutes: 45   Total Minutes: 45  Therapy Charges for Today     Code Description Service Date Service Provider Modifiers Qty    79406043390 HC PT THER PROC EA 15 MIN 4/26/2023 Tory Carlos, PT GP 2    77240029806 HC PT MANUAL THERAPY EA 15 MIN 4/26/2023 Tory Carlos, PT GP 1                    Tory Carlos, PT  4/26/2023

## 2023-05-03 ENCOUNTER — HOSPITAL ENCOUNTER (OUTPATIENT)
Dept: PHYSICAL THERAPY | Facility: HOSPITAL | Age: 23
Setting detail: THERAPIES SERIES
Discharge: HOME | End: 2023-05-03
Payer: COMMERCIAL

## 2023-05-03 DIAGNOSIS — R53.1 WEAKNESS: ICD-10-CM

## 2023-05-03 DIAGNOSIS — M25.551 RIGHT HIP PAIN: ICD-10-CM

## 2023-05-03 DIAGNOSIS — M54.2 CERVICAL MUSCLE PAIN: Primary | ICD-10-CM

## 2023-05-04 NOTE — THERAPY TREATMENT NOTE
Outpatient Physical Therapy Ortho Treatment Note  [facility]     Patient Name: Tran Quintanilla  : 2000  MRN: [id number]  Today's Date: 5/3/2023      Visit Date: 2023    Visit Dx:    ICD-10-CM ICD-9-CM   1. Cervical muscle pain  M54.2 723.1   2. Right hip pain  M25.551 719.45   3. Weakness  R53.1 780.79       Patient Active Problem List   Diagnosis   • Migraine headache   • IUD (intrauterine device) in place   • Rectal bleeding   • H/O Clostridium difficile infection   • Abdominal pain   • Diarrhea   • Heartburn        Past Medical History:   Diagnosis Date   • Anesthesia     BLOOD PRESSURE DROPPED POST-OP WITH EAR TUBES AGE 4   • Anxiety    • Deviated septum    • History of Clostridium difficile colitis     NUMEROUS ANTIBIOTICS WITH STREP HX   • History of strep sore throat     7 TIMES IN    • Migraines    • Rectal bleeding         Past Surgical History:   Procedure Laterality Date   • COLONOSCOPY N/A 2022    Procedure: COLONOSCOPY TO CECUM AND TERMINAL ILEUM WITH BIOPSIES;  Surgeon: Kaleb Joyner MD;  Location: [facility] ENDOSCOPY;  Service: Gastroenterology;  Laterality: N/A;  PRE- BLOOD IN STOOL  POST- HEMORRHOIDS, ANAL FISSURE   • EAR TUBES     • ENDOSCOPY N/A 2022    Procedure: ESOPHAGOGASTRODUODENOSCOPY WITH BIOPSIES;  Surgeon: Kaleb Joyner MD;  Location: [facility] ENDOSCOPY;  Service: Gastroenterology;  Laterality: N/A;  PRE- ABDOMINAL PAIN  POST- MILD GASTRITIS   • SIGMOIDOSCOPY N/A 2022    Procedure: FLEXIBLE SIGMOIDOSCOPY WITH BIOPSIES;  Surgeon: Baron Ayoub MD;  Location: [facility] ENDOSCOPY;  Service: Gastroenterology;  Laterality: N/A;  Pre: rectal bleeding  Post: hemorrhoids   • TONSILLECTOMY Bilateral 2021    Procedure: TONSILLECTOMY;  Surgeon: Duc Alvarez MD;  Location: [facility] MAIN OR;  Service: ENT;  Laterality: Bilateral;   • WISDOM TOOTH EXTRACTION                          PT Assessment/Plan     Row Name 23 1606          PT  Assessment    Assessment Comments Pt reporting increased muscle tension this date and requesting repeat DDN. Pt with many moderate to large LTRs L UT tissue, many along anterior fibers as well as deep muscle tissue. Pt with moderate pain response and tearful at end of session. Educated pt that lacrimation is a potential side effect of DDN and reviewed post op instructions including use of heat/ice and stretching. Advised pt to call with questions/concerns if needed.  -CN        PT Plan    PT Plan Comments Assess response to DDN, consider repeat DDN vs return to strengthening and possible progression to push up at incline and shoulder flex with theraloop and drivers.  -PATRICIO           User Key  (r) = Recorded By, (t) = Taken By, (c) = Cosigned By    Initials Name Provider Type    Marissa Harrington, KATARINA Physical Therapist                   OP Exercises     Row Name 05/03/23 1600             Subjective Comments    Subjective Comments It is tight today. I went to [place] over the weekend and was very active.  -PATRICIO         Exercise 1    Exercise Name 1 Post shoulder rolls, UT stretching following DDN  -CN            User Key  (r) = Recorded By, (t) = Taken By, (c) = Cosigned By    Initials Name Provider Type    aMrissa Harrington, PT Physical Therapist                         Manual Rx (last 36 hours)     Manual Treatments     Row Name 05/03/23 1500             Manual Rx 1    Manual Rx 1 Location intramuscular manual therapy  -CN    Assessed pt. for Dry Needling and intramuscular manual therapy with DDN. Discussed risks/benefits of Dry Needling with pt, including but not limited to muscle soreness, bruising, vasovagal response, pneumothorax, nerve injury. Patient signed written consent to proceed with treatment.    Patient position during treatment: prone    Muscles treated:  L UT with pincer grasp    Response to treatment: Pt with many moderate to large LTRs L UT tissue, many along anterior fibers as well  as deep muscle tissue. Pt with moderate pain response and tearful at end of session.     Clean needle technique observed at all times, precautions for lung fields, neurovascular structures observed.      Manual palpation performed before, during, and after session to facilitate assessment.      Manual Rx 1 Type dry-needling  -CN      Manual Rx 1 Grade 30  -CN            User Key  (r) = Recorded By, (t) = Taken By, (c) = Cosigned By    Initials Name Provider Type    Marissa Harrington, PT Physical Therapist                 PT OP Goals     Row Name 05/03/23 1600          PT Short Term Goals    STG Date to Achieve 04/27/23  -CN     STG 1 Pt will report pain rated 2/10 at worst in order to demonstrate ability to return to normalized ADLs and functional activities.  -CN     STG 1 Progress Ongoing  -CN     STG 2 Pt will be independent with initial HEP for symptom management.  -CN     STG 2 Progress Met  -CN        Long Term Goals    LTG Date to Achieve 05/27/23  -CN     LTG 1 Pt will be independent and compliant with advanced HEP for long term management of symptoms and prevention of future occurrence.  -CN     LTG 1 Progress Ongoing  -CN     LTG 2 Pt will reduce level of perceived disability as measured by the NDI  to 12% in order to improve QOL.  -CN     LTG 2 Progress Ongoing  -CN     LTG 3 Pt will report 50% reduction in sleep disturbance in order to improve QOL.  -CN     LTG 3 Progress Ongoing  -CN     LTG 4 Pt will be able to drive 1 hour without exacerbation of symptoms in order to increase ease of commute to work.  -CN     LTG 4 Progress Ongoing  -CN           User Key  (r) = Recorded By, (t) = Taken By, (c) = Cosigned By    Initials Name Provider Type    Marissa Harrington, PT Physical Therapist                Therapy Education  Given: HEP, Symptoms/condition management, Pain management, Posture/body mechanics  Program: Reinforced, Progressed  How Provided: Verbal, Demonstration, Written  Provided  to: Patient  Level of Understanding: Teach back education performed, Verbalized, Demonstrated              Time Calculation:   Start Time: 1525  Stop Time: 1604  Time Calculation (min): 39 min  Therapy Charges for Today     Code Description Service Date Service Provider Modifiers Qty    99935290847 HC PT SELF PAY DRY NEEDLING SESSION 5/3/2023 Marissa Prater, PT  1                    Marissa Prater, PT  5/3/2023

## 2023-05-05 ENCOUNTER — HOSPITAL ENCOUNTER (OUTPATIENT)
Dept: PHYSICAL THERAPY | Facility: HOSPITAL | Age: 23
Setting detail: THERAPIES SERIES
Discharge: HOME | End: 2023-05-05
Payer: COMMERCIAL

## 2023-05-05 DIAGNOSIS — M54.2 CERVICAL MUSCLE PAIN: Primary | ICD-10-CM

## 2023-05-05 PROCEDURE — 97110 THERAPEUTIC EXERCISES: CPT

## 2023-05-05 PROCEDURE — 97140 MANUAL THERAPY 1/> REGIONS: CPT

## 2023-05-05 NOTE — THERAPY PROGRESS REPORT/RE-CERT
Outpatient Physical Therapy Ortho Progress Note  [facility]     Patient Name: Tran Quintanilla  : 2000  MRN: [id number]  Today's Date: 2023      Visit Date: 2023    Visit Dx:    ICD-10-CM ICD-9-CM   1. Cervical muscle pain  M54.2 723.1       Patient Active Problem List   Diagnosis   • Migraine headache   • IUD (intrauterine device) in place   • Rectal bleeding   • H/O Clostridium difficile infection   • Abdominal pain   • Diarrhea   • Heartburn        Past Medical History:   Diagnosis Date   • Anesthesia     BLOOD PRESSURE DROPPED POST-OP WITH EAR TUBES AGE 4   • Anxiety    • Deviated septum    • History of Clostridium difficile colitis     NUMEROUS ANTIBIOTICS WITH STREP HX   • History of strep sore throat     7 TIMES IN    • Migraines    • Rectal bleeding         Past Surgical History:   Procedure Laterality Date   • COLONOSCOPY N/A 2022    Procedure: COLONOSCOPY TO CECUM AND TERMINAL ILEUM WITH BIOPSIES;  Surgeon: Kaleb Joyner MD;  Location: [facility] ENDOSCOPY;  Service: Gastroenterology;  Laterality: N/A;  PRE- BLOOD IN STOOL  POST- HEMORRHOIDS, ANAL FISSURE   • EAR TUBES     • ENDOSCOPY N/A 2022    Procedure: ESOPHAGOGASTRODUODENOSCOPY WITH BIOPSIES;  Surgeon: Kaleb Joyner MD;  Location: [facility] ENDOSCOPY;  Service: Gastroenterology;  Laterality: N/A;  PRE- ABDOMINAL PAIN  POST- MILD GASTRITIS   • SIGMOIDOSCOPY N/A 2022    Procedure: FLEXIBLE SIGMOIDOSCOPY WITH BIOPSIES;  Surgeon: Baron Ayoub MD;  Location: [facility] ENDOSCOPY;  Service: Gastroenterology;  Laterality: N/A;  Pre: rectal bleeding  Post: hemorrhoids   • TONSILLECTOMY Bilateral 2021    Procedure: TONSILLECTOMY;  Surgeon: Duc Alvarez MD;  Location: [facility] OR;  Service: ENT;  Laterality: Bilateral;   • WISDOM TOOTH EXTRACTION                          PT Assessment/Plan     Row Name 23 1600          PT Assessment    Functional Limitations Performance in leisure  activities;Performance in sport activities  -OLIVER     Impairments Pain;Muscle strength;Poor body mechanics  -OLIVER     Assessment Comments Tran Quintanilla has been seen for 10 physical therapy sessions for L sided neck pain and muscle tension. 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 She has received multiple trigger point dry needling sessions leading to significant reduction in cervical muscle tension. Her initial primary complaint at evaluation involved poor sleep quality. She no longer has sleep deficits and able to consistently sleep through the night without disturbance. Treatment has included therapeutic exercise, manual therapy, patient education with home exercise program and dry needling. Patient able to meet/partially meet 2/2 STG and 2/4 LTG and progressing toward remaining goals. Continued focus on postural strengthening exercises in attempt to reduce over compensation of L UT/LS/suboccipitals. Transitioned all supine strengthening exercises to seated/standing with good tolerance.She will benefit from continued skilled physical therapy to address remaining impairments and functional limitations.  -OLIVER     Please refer to paper survey for additional self-reported information Yes  -OLIVER     Rehab Potential Good  -OLIVER     Patient/caregiver participated in establishment of treatment plan and goals Yes  -OLIVER     Patient would benefit from skilled therapy intervention Yes  -OLIVER        PT Plan    PT Frequency 1x/week  -OLIVER     Predicted Duration of Therapy Intervention (PT) 2-4 visits  -OLIVER     Planned CPT's? PT RE-EVAL: 57880;PT THER PROC EA 15 MIN: 16722;PT THER ACT EA 15 MIN: 95342;PT MANUAL THERAPY EA 15 MIN: 56414;PT NEUROMUSC RE-EDUCATION EA 15 MIN: 57669;PT SELF CARE/HOME MGMT/TRAIN EA 15: 57461;PT HOT OR COLD PACK TREAT MCARE  -OLIVER     Physical Therapy Interventions (Optional Details) dry needling   -OLIVER     PT Plan Comments progress toward independent management  -OLIVER           User Key  (r) = Recorded By, (t) = Taken By, (c) = Cosigned By    Initials Name Provider Type    Tory Messer, PT Physical Therapist                   OP Exercises     Row Name 05/05/23 1500             Subjective Comments    Subjective Comments I am still really sore from the needling  -OLIVER         Total Minutes    94224 - PT Therapeutic Exercise Minutes 35  -OLIVER      77707 - PT Manual Therapy Minutes 10  -OLIVER         Exercise 5    Exercise Name 5 seated shoulder diagonals  -OLIVER      Cueing 5 Verbal;Demo  -OLIVER      Sets 5 2  -OLIVER      Reps 5 12e  -OLIVER      Time 5 GTB  -OLIVER         Exercise 6    Exercise Name 6 seated shoulder HA  -OLIVER      Cueing 6 Verbal;Demo  -OLIVER      Sets 6 2  -OLIVER      Reps 6 12  -OLIVER      Time 6 GTB  -OLIVER         Exercise 9    Exercise Name 9 seated UT/LS stretch  -OLIVER      Cueing 9 Verbal;Demo  -OLIVER      Reps 9 3  -OLIVER      Time 9 20s  -OLIVER         Exercise 11    Exercise Name 11 supine shoulder flexion theraloop  -OLIVER      Cueing 11 Verbal;Demo  -OLIVER      Sets 11 1  -OLIVER      Reps 11 10  -OLIVER      Time 11 RTB  -OLIVER      Additional Comments + 10 drivers  -OLIVER         Exercise 12    Exercise Name 12 incine push up +  -OLIVER      Cueing 12 Verbal;Demo  -OLIVER      Reps 12 8  -OLIVER      Time 12 fully elevated table  -OLIVER      Additional Comments heavy cues for form  -OLIVER            User Key  (r) = Recorded By, (t) = Taken By, (c) = Cosigned By    Initials Name Provider Type    Tory Messer, PT Physical Therapist                         Manual Rx (last 36 hours)     Manual Treatments     Row Name 05/05/23 1500             Total Minutes    95084 - PT Manual Therapy Minutes 10  -OLIVER         Manual Rx 2    Manual Rx 2 Location STM L UT/LS/ middle trap and rhomboid  -OLIVER         Manual Rx 3    Manual Rx 3 Location cervical distraction, sub occipitial release  -OLIVER      Manual Rx 3 Duration inc time with suboccipital release  -OLIVER             User Key  (r) = Recorded By, (t) = Taken By, (c) = Cosigned By    Initials Name Provider Type    Tory Messer PT Physical Therapist                 PT OP Goals     Row Name 05/05/23 1500          PT Short Term Goals    STG Date to Achieve 04/27/23  -OLIVER     STG 1 Pt will report pain rated 2/10 at worst in order to demonstrate ability to return to normalized ADLs and functional activities.  -OLIVER     STG 1 Progress Partially Met  -OLIVER     STG 1 Progress Comments 3-4/10  -OLIVER     STG 2 Pt will be independent with initial HEP for symptom management.  -OLIVER     STG 2 Progress Met  -OLIVER        Long Term Goals    LTG Date to Achieve 05/27/23  -OLIVER     LTG 1 Pt will be independent and compliant with advanced HEP for long term management of symptoms and prevention of future occurrence.  -OLIVER     LTG 1 Progress Ongoing  -OLIVER     LTG 2 Pt will reduce level of perceived disability as measured by the NDI  to 12% in order to improve QOL.  -OLIVER     LTG 2 Progress Ongoing;Progressing  -OLIVER     LTG 2 Progress Comments 22%  -OLIVER     LTG 3 Pt will report 50% reduction in sleep disturbance in order to improve QOL.  -OLIVER     LTG 3 Progress Met  -OLIVER     LTG 3 Progress Comments 99%  -OLIVER     LTG 4 Pt will be able to drive 1 hour without exacerbation of symptoms in order to increase ease of commute to work.  -OLIVER     LTG 4 Progress Partially Met  -OLIVER     LTG 4 Progress Comments 40-45 mins  -OLIVER           User Key  (r) = Recorded By, (t) = Taken By, (c) = Cosigned By    Initials Name Provider Type    Tory Messer PT Physical Therapist                        Neck Disability Index  Section 1 - Pain Intensity: The pain comes and goes and is moderate.  Section 2 - Personal Care: I can look after myself normally without causing extra pain.  Section 3 - Lifting: I can lift heavy weights, but it causes extra pain.  Section 4 - Work: I can do as much work as I want.  Section 5 - Headaches: I have moderate headaches that come  frequently  Section 6 - Concentration: I can concentrate fully when I want to without difficulty.  Section 7 - Sleeping: My sleep is mildly disturbed (1-2 hours sleepless).  Section 8 - Driving: I can drive as long as I want with slight neck pain.  Section 9 - Reading: I can read as much as I want with no neck pain.  Section 10 - Recreation: I am able to engage in most but not all recreational activities because of pain in my neck.  Neck Disability Index Score: 11      Time Calculation:   Start Time: 1525  Stop Time: 1610  Time Calculation (min): 45 min  Timed Charges  16898 - PT Therapeutic Exercise Minutes: 35  66649 - PT Manual Therapy Minutes: 10  Total Minutes  Timed Charges Total Minutes: 45   Total Minutes: 45  Therapy Charges for Today     Code Description Service Date Service Provider Modifiers Qty    32065604909 HC PT THER PROC EA 15 MIN 5/5/2023 Tory Carlos, PT GP 2    11021398357 HC PT MANUAL THERAPY EA 15 MIN 5/5/2023 Tory Carlos, PT GP 1          PT G-Codes  Neck Disability Index Score: 11         Tory Carlos, PT  5/5/2023

## 2023-05-08 ENCOUNTER — TELEPHONE (OUTPATIENT)
Dept: INTERNAL MEDICINE | Facility: CLINIC | Age: 23
End: 2023-05-08
Payer: COMMERCIAL

## 2023-05-10 ENCOUNTER — TELEPHONE (OUTPATIENT)
Dept: GENETICS | Facility: HOSPITAL | Age: 23
End: 2023-05-10
Payer: COMMERCIAL

## 2023-05-10 ENCOUNTER — HOSPITAL ENCOUNTER (OUTPATIENT)
Dept: PHYSICAL THERAPY | Facility: HOSPITAL | Age: 23
Setting detail: THERAPIES SERIES
Discharge: HOME | End: 2023-05-10
Payer: COMMERCIAL

## 2023-05-10 DIAGNOSIS — M54.2 CERVICAL MUSCLE PAIN: Primary | ICD-10-CM

## 2023-05-10 PROCEDURE — 97140 MANUAL THERAPY 1/> REGIONS: CPT

## 2023-05-10 PROCEDURE — 97110 THERAPEUTIC EXERCISES: CPT

## 2023-05-10 NOTE — TELEPHONE ENCOUNTER
Called pt to collect her family hx prior to her genetics appt tomorrow. Pt will be emailing me her family hx questionnaire.

## 2023-05-10 NOTE — THERAPY TREATMENT NOTE
Outpatient Physical Therapy Ortho Treatment Note  [facility]     Patient Name: Tran Quintanilla  : 2000  MRN: [id number]  Today's Date: 5/10/2023      Visit Date: 05/10/2023    Visit Dx:    ICD-10-CM ICD-9-CM   1. Cervical muscle pain  M54.2 723.1   2. Right hip pain  M25.551 719.45   3. Weakness  R53.1 780.79       Patient Active Problem List   Diagnosis   • Migraine headache   • IUD (intrauterine device) in place   • Rectal bleeding   • H/O Clostridium difficile infection   • Abdominal pain   • Diarrhea   • Heartburn        Past Medical History:   Diagnosis Date   • Anesthesia     BLOOD PRESSURE DROPPED POST-OP WITH EAR TUBES AGE 4   • Anxiety    • Deviated septum    • History of Clostridium difficile colitis     NUMEROUS ANTIBIOTICS WITH STREP HX   • History of strep sore throat     7 TIMES IN    • Migraines    • Rectal bleeding         Past Surgical History:   Procedure Laterality Date   • COLONOSCOPY N/A 2022    Procedure: COLONOSCOPY TO CECUM AND TERMINAL ILEUM WITH BIOPSIES;  Surgeon: Kaleb Joyner MD;  Location: [facility] ENDOSCOPY;  Service: Gastroenterology;  Laterality: N/A;  PRE- BLOOD IN STOOL  POST- HEMORRHOIDS, ANAL FISSURE   • EAR TUBES     • ENDOSCOPY N/A 2022    Procedure: ESOPHAGOGASTRODUODENOSCOPY WITH BIOPSIES;  Surgeon: Kaleb Joyner MD;  Location: [facility] ENDOSCOPY;  Service: Gastroenterology;  Laterality: N/A;  PRE- ABDOMINAL PAIN  POST- MILD GASTRITIS   • SIGMOIDOSCOPY N/A 2022    Procedure: FLEXIBLE SIGMOIDOSCOPY WITH BIOPSIES;  Surgeon: Baron Ayoub MD;  Location: [facility] ENDOSCOPY;  Service: Gastroenterology;  Laterality: N/A;  Pre: rectal bleeding  Post: hemorrhoids   • TONSILLECTOMY Bilateral 2021    Procedure: TONSILLECTOMY;  Surgeon: Duc Alvarez MD;  Location: [facility] MAIN OR;  Service: ENT;  Laterality: Bilateral;   • WISDOM TOOTH EXTRACTION                          PT Assessment/Plan     Row Name 05/10/23 1611          PT  Assessment    Assessment Comments Pt reporting low pain this date, however increased symptoms on Monday following dry wall and mudding at house. Continued with current program with addition of wall walks and thread the needle on wall with cues for form intermittently. Discussed use of theracane with work/home tasks to manage trigger points.  -CN        PT Plan    PT Plan Comments Continue to progress scapular strengthening with plan to D/C to I management.  -CN           User Key  (r) = Recorded By, (t) = Taken By, (c) = Cosigned By    Initials Name Provider Type    Marissa Harrington, PT Physical Therapist                   OP Exercises     Row Name 05/10/23 1500             Subjective Comments    Subjective Comments It feels ok today, it was bothering me on Monday though. I have been doing a lot of work on the house and using my hands above my head.  -CN         Total Minutes    54676 - PT Therapeutic Exercise Minutes 32  -CN      47435 - PT Manual Therapy Minutes 8  -CN         Exercise 1    Exercise Name 1 Wall walk, RTB  -CN      Cueing 1 Verbal;Demo  -CN      Reps 1 10 B  -CN         Exercise 2    Exercise Name 2 Thread the needle on wall  -CN      Cueing 2 Verbal;Demo  -CN      Reps 2 10 B  -CN         Exercise 5    Exercise Name 5 seated shoulder diagonals  -CN      Cueing 5 Verbal;Demo  -CN      Sets 5 2  -CN      Reps 5 12e  -CN      Time 5 GTB  -CN         Exercise 6    Exercise Name 6 seated shoulder HA  -CN      Cueing 6 Verbal;Demo  -CN      Sets 6 2  -CN      Reps 6 12  -CN      Time 6 GTB  -CN         Exercise 9    Exercise Name 9 seated UT/LS stretch  -CN      Cueing 9 Verbal;Demo  -CN      Reps 9 3  -CN      Time 9 20s  -CN         Exercise 11    Exercise Name 11 supine shoulder flexion theraloop  -CN      Cueing 11 Verbal;Demo  -CN      Sets 11 1  -CN      Reps 11 10  -CN      Time 11 RTB  -CN      Additional Comments + 10 drivers  -CN         Exercise 12    Exercise Name 12 incine push up  +  -CN      Cueing 12 Verbal;Demo  -CN      Reps 12 8  -CN      Time 12 fully elevated table  -CN      Additional Comments heavy cues for form  -CN            User Key  (r) = Recorded By, (t) = Taken By, (c) = Cosigned By    Initials Name Provider Type    Marissa Harrington, PT Physical Therapist                         Manual Rx (last 36 hours)     Manual Treatments     Row Name 05/10/23 1500             Total Minutes    42999 - PT Manual Therapy Minutes 8  -CN         Manual Rx 2    Manual Rx 2 Location STM L UT/LS/ middle trap and rhomboid  -CN         Manual Rx 3    Manual Rx 3 Location cervical distraction, sub occipitial release  -CN      Manual Rx 3 Duration inc time with suboccipital release  -CN            User Key  (r) = Recorded By, (t) = Taken By, (c) = Cosigned By    Initials Name Provider Type    Marissa Harrington, PT Physical Therapist                 PT OP Goals     Row Name 05/10/23 1600          PT Short Term Goals    STG Date to Achieve 04/27/23  -CN     STG 1 Pt will report pain rated 2/10 at worst in order to demonstrate ability to return to normalized ADLs and functional activities.  -CN     STG 1 Progress Partially Met  -CN     STG 2 Pt will be independent with initial HEP for symptom management.  -CN     STG 2 Progress Met  -CN        Long Term Goals    LTG Date to Achieve 05/27/23  -CN     LTG 1 Pt will be independent and compliant with advanced HEP for long term management of symptoms and prevention of future occurrence.  -CN     LTG 1 Progress Ongoing  -CN     LTG 1 Progress Comments Progressing scapular strengthening and flexibility as tolerated.  -CN     LTG 2 Pt will reduce level of perceived disability as measured by the NDI  to 12% in order to improve QOL.  -CN     LTG 2 Progress Ongoing;Progressing  -CN     LTG 3 Pt will report 50% reduction in sleep disturbance in order to improve QOL.  -CN     LTG 3 Progress Met  -CN     LTG 4 Pt will be able to drive 1 hour  without exacerbation of symptoms in order to increase ease of commute to work.  -PATRICIO     LTG 4 Progress Partially Met  -PATRICIO           User Key  (r) = Recorded By, (t) = Taken By, (c) = Cosigned By    Initials Name Provider Type    Marissa Harrington, PT Physical Therapist                Therapy Education  Given: HEP, Symptoms/condition management, Pain management, Posture/body mechanics  Program: Reinforced, Progressed  How Provided: Verbal, Demonstration  Provided to: Patient  Level of Understanding: Teach back education performed, Verbalized, Demonstrated              Time Calculation:   Start Time: 1531  Stop Time: 1611  Time Calculation (min): 40 min  Timed Charges  99516 - PT Therapeutic Exercise Minutes: 32  37154 - PT Manual Therapy Minutes: 8  Total Minutes  Timed Charges Total Minutes: 40   Total Minutes: 40  Therapy Charges for Today     Code Description Service Date Service Provider Modifiers Qty    14403893079  PT THER PROC EA 15 MIN 5/10/2023 Marissa Prater, PT GP 2    03585072198 HC PT MANUAL THERAPY EA 15 MIN 5/10/2023 Marissa Prater, PT GP 1                    Marissa Prater PT  5/10/2023

## 2023-05-11 ENCOUNTER — CLINICAL SUPPORT (OUTPATIENT)
Dept: GENETICS | Facility: HOSPITAL | Age: 23
End: 2023-05-11
Payer: COMMERCIAL

## 2023-05-11 DIAGNOSIS — Z80.8 FAMILY HISTORY OF MELANOMA: ICD-10-CM

## 2023-05-11 DIAGNOSIS — Z80.3 FAMILY HISTORY OF BREAST CANCER: ICD-10-CM

## 2023-05-11 DIAGNOSIS — Z80.42 FAMILY HISTORY OF PROSTATE CANCER: ICD-10-CM

## 2023-05-11 DIAGNOSIS — Z13.79 GENETIC TESTING: Primary | ICD-10-CM

## 2023-05-11 PROCEDURE — 96040: CPT | Performed by: GENETIC COUNSELOR, MS

## 2023-05-11 NOTE — PROGRESS NOTES
Tran Quintanilla, a 22-year-old female, was referred for genetic counseling due to a family history of cancer. Ms. Quintanilla has no personal history of cancer. She was 10 years old at menarche and is nulliparous. She is pre-menopausal and retains her uterus and ovaries. Ms. Quintanilla’s current breast cancer screening includes annual clinical breast exams. She had a colonoscopy in September 2022 due to GI distress that was normal. She was interested in discussing her risk for a hereditary cancer syndrome. Ms. Quintanilla decided to pursue comprehensive genetic testing to evaluate her risk of cancer, therefore the CancerNext Expanded Panel was ordered through [facility] which analyzes 77 genes associated with an increased cancer risk. Results are expected in 2-3 weeks.      PERTINENT FAMILY HISTORY: (See attached pedigree)   Father:   Melanoma  Pat Aunt 1:   Breast cancer  Pat Aunt 2:  Breast cancer  Pat Uncle:  Prostate cancer  Pat Grandmother: Melanoma    Ms. Quintanilla also mentioned that both of her paternal great grandparents had Non-Hodgkins Lymphoma. We do not have medical records regarding any of these diagnoses.       RISK ASSESSMENT:  Ms. Quintanilla’s family history of breast cancer raises the question of a hereditary cancer syndrome. NCCN guidelines for genetic testing for BRCA1/2 states that individuals with three or more relatives with breast or prostate cancer at any age may consider genetic testing. With Ms. Quintanilla’s paternal family history, she would meet this criteria. This risk assessment is based on the family history information provided at the time of the appointment. The assessment could change in the future should new information be obtained.    GENETIC COUNSELING (30 minutes):  We reviewed the family history information in detail. Cases of cancer follow three general patterns: sporadic, familial, and hereditary. While most cancer is sporadic, some cases appear to occur in family clusters. These cases are said to be familial  and account for 10-20% of cancer cases. Familial cases may be due to a combination of shared genes and environmental factors among family members. In even fewer cases, the risk for cancer is inherited, and the genes responsible for the increased cancer risk are known.       Family histories typical of hereditary cancer syndromes usually include multiple first- and second-degree relatives diagnosed with cancer types that define a syndrome. These cases tend to be diagnosed at younger-than-expected ages and can be bilateral or multifocal. The cancer in these families follows an autosomal dominant inheritance pattern, which indicates the likely presence of a mutation in a cancer susceptibility gene. Children and siblings of an individual believed to carry this mutation have a 50% chance of inheriting that mutation, thereby inheriting the increased risk to develop cancer. These mutations can be passed down from the maternal or the paternal lineage.     Due to Ms. Quintanilla’s family history of breast cancer, we discussed hereditary breast cancer. Hereditary breast cancer accounts for 5-10% of all cases of breast cancer. A significant proportion (50%) of hereditary breast cancer can be attributed to mutations in the BRCA1 and BRCA2 genes. Mutations in these genes confer an increased risk for breast cancer, ovarian cancer, male breast cancer, prostate cancer, and pancreatic cancer. Women with a BRCA1 or BRCA2 mutation have up to an 87% lifetime risk of breast cancer and up to a 58% risk of ovarian cancer. There are other clinically significant breast cancer related genes in addition to BRCA1/2, including PALB2, RENA, and CHEK2.     There are other hereditary cancer syndromes as well. Based on Ms. Quintanilla’s family history and her desire to get more information regarding her personal risks, testing was pursued through a multigene panel evaluating several other genes known to increase the risk for cancer.    GENETIC TESTING:  The  risks, benefits, and limitations of genetic testing and implications for clinical management following testing were reviewed. DNA test results can influence decisions regarding screening and prevention. Genetic testing can have significant psychological implications for both individuals and families. Also discussed was the possibility of employment and insurance discrimination based on genetic test results and the laws in place to prevent this, as well as the limitations of these laws.       We discussed panel testing, which would involve testing 77 genes associated with increased cancer risk. The implications of a positive or negative test result were discussed. We also discussed the importance of testing an affected relative and how a negative result for Ms. Quintanilla wouldn’t necessarily mean the cancers presenting in her family weren’t due to a genetic mutation that Ms. Quintanilla did not inherit. In general, a negative genetic test result is most informative if a mutation has first been established in an affected member of the family. In cases where an affected individual is not available or interested in testing, it is appropriate to offer testing to an unaffected individual. We discussed the possibility that, in some cases, genetic test results may be ambiguous due to the identification of a genetic variant. These variants may or may not be associated with an increased cancer risk. With multigene panel testing, it is not uncommon for a variant of uncertain significance (VUS) to be identified. If a VUS is identified, testing family members is typically not recommended and screening recommendations are made based on the family history. The laboratories that perform genetic testing work to reclassify the VUS and send out an amended report if and when a VUS is reclassified. The majority of variant findings are ultimately reclassified to a negative result. Given Ms. Quintanilla’s family history, a negative test result does not  eliminate all cancer risk, although the risk would not be as high as it would with positive genetic testing.     PLAN: Genetic testing was ordered via the CancerNext Expanded Panel through [facility] [facility]. Results are expected in 2-3 weeks and will be called out to Ms. Quintanilla. If she has any questions in the meantime, she is welcome to call me at [phone].      Courtney Kinney MS, [facility] [place]  Licensed Certified Genetic Counselor

## 2023-05-30 ENCOUNTER — TELEPHONE (OUTPATIENT)
Dept: GENETICS | Facility: HOSPITAL | Age: 23
End: 2023-05-30

## 2023-05-30 NOTE — TELEPHONE ENCOUNTER
Attempted to contact Ms. Quintanilla regarding her genetic test results. She didn't answer. Left VM asking her to return my call.

## 2023-05-31 ENCOUNTER — DOCUMENTATION (OUTPATIENT)
Dept: GENETICS | Facility: HOSPITAL | Age: 23
End: 2023-05-31

## 2023-05-31 DIAGNOSIS — E53.8 B12 DEFICIENCY: ICD-10-CM

## 2023-05-31 RX ORDER — CYANOCOBALAMIN 1000 UG/ML
1000 INJECTION, SOLUTION INTRAMUSCULAR; SUBCUTANEOUS
Qty: 1 ML | Refills: 3 | Status: SHIPPED | OUTPATIENT
Start: 2023-05-31

## 2023-05-31 NOTE — PROGRESS NOTES
Tran Quintanilla, a 22-year-old female, was referred for genetic counseling due to a family history of cancer. Ms. Quintanilla has no personal history of cancer. She was 10 years old at menarche and is nulliparous. She is pre-menopausal and retains her uterus and ovaries. Ms. Quintanilla’s current breast cancer screening includes annual clinical breast exams. She had a colonoscopy in September 2022 due to GI distress that was normal. She was interested in discussing her risk for a hereditary cancer syndrome. Ms. Quintanilla decided to pursue comprehensive genetic testing to evaluate her risk of cancer, therefore the CancerNext Expanded Panel was ordered through [facility] which analyzes 77 genes associated with an increased cancer risk. Genetic testing was positive for one pathogenic MUTYH gene mutation, meaning that she is a carrier (heterozygous) for MYH-associated polyposis (MAP). These results were discussed with Ms. Quintanilla by telephone on 5/30/23.     PERTINENT FAMILY HISTORY: (See attached pedigree)   Father:   Melanoma  Pat Aunt 1:   Breast cancer  Pat Aunt 2:  Breast cancer  Pat Uncle:  Prostate cancer  Pat Grandmother: Melanoma    Ms. Quintanilla also mentioned that both of her paternal great grandparents had Non-Hodgkins Lymphoma. We do not have medical records regarding any of these diagnoses.       RISK ASSESSMENT:  Ms. Quintanilla’s family history of breast cancer raises the question of a hereditary cancer syndrome. NCCN guidelines for genetic testing for BRCA1/2 states that individuals with three or more relatives with breast or prostate cancer at any age may consider genetic testing. With Ms. Quintanilla’s paternal family history, she would meet this criteria. This risk assessment is based on the family history information provided at the time of the appointment. The assessment could change in the future should new information be obtained.    GENETIC COUNSELING (30 minutes):  We reviewed the family history information in detail. Cases of cancer  follow three general patterns: sporadic, familial, and hereditary. While most cancer is sporadic, some cases appear to occur in family clusters. These cases are said to be familial and account for 10-20% of cancer cases. Familial cases may be due to a combination of shared genes and environmental factors among family members. In even fewer cases, the risk for cancer is inherited, and the genes responsible for the increased cancer risk are known.       Family histories typical of hereditary cancer syndromes usually include multiple first- and second-degree relatives diagnosed with cancer types that define a syndrome. These cases tend to be diagnosed at younger-than-expected ages and can be bilateral or multifocal. The cancer in these families follows an autosomal dominant inheritance pattern, which indicates the likely presence of a mutation in a cancer susceptibility gene. Children and siblings of an individual believed to carry this mutation have a 50% chance of inheriting that mutation, thereby inheriting the increased risk to develop cancer. These mutations can be passed down from the maternal or the paternal lineage.     Due to Ms. Quintanilla’s family history of breast cancer, we discussed hereditary breast cancer. Hereditary breast cancer accounts for 5-10% of all cases of breast cancer. A significant proportion (50%) of hereditary breast cancer can be attributed to mutations in the BRCA1 and BRCA2 genes. Mutations in these genes confer an increased risk for breast cancer, ovarian cancer, male breast cancer, prostate cancer, and pancreatic cancer. Women with a BRCA1 or BRCA2 mutation have up to an 87% lifetime risk of breast cancer and up to a 58% risk of ovarian cancer. There are other clinically significant breast cancer related genes in addition to BRCA1/2, including PALB2, RENA, and CHEK2.     There are other hereditary cancer syndromes as well. Based on Ms. Quintanilla’s family history and her desire to get more  information regarding her personal risks, testing was pursued through a multigene panel evaluating several other genes known to increase the risk for cancer.    GENETIC TESTING:  The risks, benefits, and limitations of genetic testing and implications for clinical management following testing were reviewed. DNA test results can influence decisions regarding screening and prevention. Genetic testing can have significant psychological implications for both individuals and families. Also discussed was the possibility of employment and insurance discrimination based on genetic test results and the laws in place to prevent this, as well as the limitations of these laws.       We discussed panel testing, which would involve testing 77 genes associated with increased cancer risk. The implications of a positive or negative test result were discussed. We also discussed the importance of testing an affected relative and how a negative result for Ms. Quintanilla wouldn’t necessarily mean the cancers presenting in her family weren’t due to a genetic mutation that Ms. Quintanilla did not inherit. In general, a negative genetic test result is most informative if a mutation has first been established in an affected member of the family. In cases where an affected individual is not available or interested in testing, it is appropriate to offer testing to an unaffected individual. We discussed the possibility that, in some cases, genetic test results may be ambiguous due to the identification of a genetic variant. These variants may or may not be associated with an increased cancer risk. With multigene panel testing, it is not uncommon for a variant of uncertain significance (VUS) to be identified. If a VUS is identified, testing family members is typically not recommended and screening recommendations are made based on the family history. The laboratories that perform genetic testing work to reclassify the VUS and send out an amended report if  and when a VUS is reclassified. The majority of variant findings are ultimately reclassified to a negative result. Given Ms. Quintanilla’s family history, a negative test result does not eliminate all cancer risk, although the risk would not be as high as it would with positive genetic testing.     TEST RESULTS:  Genetic testing was positive for one pathogenic mutation ([id number].1187G>A) in the MUTYH gene (see attached results). The presence of one MUTYH mutation indicates that Ms. Quintanilla does NOT have MUTYH-Associated Polyposis (MAP), but is a carrier. Unlike most hereditary cancer syndromes, MAP is inherited in an autosomal recessive manner; therefore, Ms. Quintanilla being identified as having one MUTYH mutation does not place her at the significantly increased cancer risks that are seen when someone has two mutations, one in each copy of the gene. The risk of colon cancer in individuals with one MUTYH mutation is unclear. Those individuals with a first-degree relative with colon cancer may have between a 10-13% risk while those irrespective to family history may be at a 6-7% risk. Ms. Quintanilla does not have a reported family history of colon cancer so her risk is most likely in the lower range.    Based on Ms. Quintanilla’s test results, any future children each have a 50% chance to have inherited the MUTYH mutation identified. The general population carrier frequency is 1-2%; therefore, it is possible for Ms. Quintanilla’s family members to have inherited an additional mutation from the other parent. Therefore, rather than just performing single site analysis for the identified mutation, full MUTYH analysis is indicated for family members. Genetic counseling and testing could be considered for Ms. Quintanilla’s family members.  For individuals found to have two MUTYH mutations, screening colonoscopy begins at age 25. Genetic testing for adult onset conditions is not recommended before age 18. We would be happy to see family members who live in the area in  our clinic to further discuss this information and testing options. Relatives can call our office at [phone] for assistance in scheduling an appointment; however, a physician referral to our office will prompt our coordinator to contact patients to schedule an appointment. For family members who live elsewhere, there are genetic counselors at most [facility] centers. They can find a genetic counselor by visiting the National Society of Genetic Counselors website at www.nsgc.org.  Relatives would need a copy of Ms. Quintanilla’s genetic test result to ensure they were being tested for the correct gene.    SURVEILLANCE:  Per current NCCN guidelines, individuals who are carriers of one MUTYH mutation and have a family history of colon cancer in a first-degree relative should begin screening colonoscopy at age 40 or ten years prior to the youngest colorectal cancer diagnosis in the family and repeated every five years. Ms. Quintanilla does not have a known history of colon cancer in her family. General population screening colonoscopy is recommended at age 45.      At this time, Ms. Acuñas lifetime risk of developing breast cancer should be assessed using family history-based risk assessment models that take into account personal history factors (age at menarche, parity, etc.) and family history information. Using these computer models, Ms. Quintanilla’s lifetime breast cancer risk is estimated to be up to 21.8% (CLAUDIA/Aniya-Gonsalo), compared to the general population risk of 12.5%. A risk greater than 20% warrants consideration of increased screening per NCCN guidelines.      Increased breast surveillance would consist of semi-annual clinical breast exams and monthly self-breast exams starting by age 18 and annual mammography starting 10 years younger than the earliest diagnosis in the family, or by age 40, whichever is earliest. According to an American Cancer Society expert panel and NCCN guidelines, annual breast MRI should be  offered to women whose lifetime risk of breast cancer is 20-25 percent or more, typically beginning at the same age as mammography. Breast cancer chemoprevention is another option that can be considered for women who have a lifetime risk of 20% or greater. Studies have shown that Tamoxifen and Raloxifene can cut the risk of estrogen receptor positive breast cancer by up to 50% when taken by high-risk women over a 5-year period. These are typically not recommended before age 35. There are risks and side effects associated with these medications; therefore, the risks versus benefits must be considered prior to deciding to take chemopreventative medications. This risk assessment is based on the family history information provided at the time of the appointment and could change in the future should new information be obtained.    Since an affected individual in the family has not had testing, it is possible that the family history is due to a hereditary cancer syndrome that Ms. Quintanilla did not happen to inherit. If a relative of Ms. Quintanilla were to have testing and was found to carry a mutation in a different gene that was included on this panel in the future, then her risk assessment would need to be updated.    PLAN: Genetic counseling remains available to Ms. Quintanilla. We discussed the [facility] with Dr. Christopher Geiger at [facility]. She would like to be seen in this clinic, and we will place the referral for that. If she has any questions in the future, she is welcome to call me at [phone].      Courtney Kinney, MS, [facility] [place], [facility]  Licensed Certified Genetic Counselor      Cc: Tran Avilajose r Rangel PA-C

## 2023-06-06 ENCOUNTER — TELEPHONE (OUTPATIENT)
Dept: INTERNAL MEDICINE | Facility: CLINIC | Age: 23
End: 2023-06-06

## 2023-06-06 NOTE — TELEPHONE ENCOUNTER
Caller: DAWSON - [employer]    Best call back number: [phone]    What is the best time to reach you: AS SOON AS POSSIBLE (DAWSON DID NOT GIVE HER HOURS OF OPERATIONS)    Who are you requesting to speak with (clinical staff, provider,  specific staff member): CLINICAL    What was the call regarding: DAWSON STATES SHE IS REQUESTING AN UPDATE ON THE PRIOR AUTHORIZATION FOR [employer].     DAWSON IS REQUESTING A CALL BACK AS SOON AS POSSIBLE.

## 2023-06-07 ENCOUNTER — HOSPITAL ENCOUNTER (OUTPATIENT)
Dept: PHYSICAL THERAPY | Facility: HOSPITAL | Age: 23
Setting detail: THERAPIES SERIES
Discharge: HOME | End: 2023-06-07
Payer: COMMERCIAL

## 2023-06-07 DIAGNOSIS — M54.2 CERVICAL MUSCLE PAIN: Primary | ICD-10-CM

## 2023-06-07 NOTE — THERAPY TREATMENT NOTE
Outpatient Physical Therapy Ortho Treatment Note  [facility]     Patient Name: Tran Quintanilla  : 2000  MRN: [id number]  Today's Date: 2023      Visit Date: 2023    Visit Dx:    ICD-10-CM ICD-9-CM   1. Cervical muscle pain  M54.2 723.1       Patient Active Problem List   Diagnosis    Migraine headache    IUD (intrauterine device) in place    Rectal bleeding    H/O Clostridium difficile infection    Abdominal pain    Diarrhea    Heartburn        Past Medical History:   Diagnosis Date    Anesthesia     BLOOD PRESSURE DROPPED POST-OP WITH EAR TUBES AGE 4    Anxiety     Deviated septum     History of Clostridium difficile colitis     NUMEROUS ANTIBIOTICS WITH STREP HX    History of strep sore throat     7 TIMES IN     Migraines     Rectal bleeding         Past Surgical History:   Procedure Laterality Date    COLONOSCOPY N/A 2022    Procedure: COLONOSCOPY TO CECUM AND TERMINAL ILEUM WITH BIOPSIES;  Surgeon: Kaleb Joyner MD;  Location: [facility];  Service: Gastroenterology;  Laterality: N/A;  PRE- BLOOD IN STOOL  POST- HEMORRHOIDS, ANAL FISSURE    EAR TUBES      ENDOSCOPY N/A 2022    Procedure: ESOPHAGOGASTRODUODENOSCOPY WITH BIOPSIES;  Surgeon: Kaleb Joyner MD;  Location: [facility];  Service: Gastroenterology;  Laterality: N/A;  PRE- ABDOMINAL PAIN  POST- MILD GASTRITIS    SIGMOIDOSCOPY N/A 2022    Procedure: FLEXIBLE SIGMOIDOSCOPY WITH BIOPSIES;  Surgeon: Baron Ayoub MD;  Location: [facility];  Service: Gastroenterology;  Laterality: N/A;  Pre: rectal bleeding  Post: hemorrhoids    TONSILLECTOMY Bilateral 2021    Procedure: TONSILLECTOMY;  Surgeon: Duc Alvarez MD;  Location: [facility] OR;  Service: ENT;  Laterality: Bilateral;    WISDOM TOOTH EXTRACTION                          PT Assessment/Plan       Row Name 23 1500          PT Assessment    Assessment Comments Ms. Quintanilla returns to PT after nearly one month with  reports of continued reduction in overall pain and maintained postural awareness with work and house hold projects. Due to lingering L sided UT/LS muscle tension, proceeded with DDN this date. She responded with multiple moderate-large local and deep twitch responses followed my immediate reduction in pain/tension. Patient tearful at end of session but denies pain. She would benefit from DDN PRN.  -OLIVER        PT Plan    PT Plan Comments continue DDN as needed  -OLIVER               User Key  (r) = Recorded By, (t) = Taken By, (c) = Cosigned By      Initials Name Provider Type    Tory Messer, PT Physical Therapist                       OP Exercises       Row Name 06/07/23 1500             Subjective Comments    Subjective Comments I am feeling overall pretty good. I have some tension still because of work/house projects  -OLIVER                User Key  (r) = Recorded By, (t) = Taken By, (c) = Cosigned By      Initials Name Provider Type    Tory Messer, PT Physical Therapist                             Manual Rx (last 36 hours)       Manual Treatments       Row Name 06/07/23 1500             Manual Rx 1    Manual Rx 1 Location intramuscular manual therapy  -      Manual Rx 1 Type dry-needling  -      Manual Rx 1 Grade 25  -OLIVER    Tran Quintanilla was assessed for dry-needling and intramuscular manual therapy.     Reviewed risks/benefits of Dry Needling with patient, including but not limited to muscle soreness, bruising, vasovagal response, pneumothorax, nerve injury.     Patient previously signed written consent to proceed with treatment.     Patient position in R SLing during treatment and L UT/LS muscles treated. Patient responded multiple moderate-large local and deep twitch responses.     Utilized palpation before, during, and after to facilitate assessment for trigger points and musclar integrity.     Clean needle technique observed at all times, precautions for lung fields, neurovascular  structures observed.                 User Key  (r) = Recorded By, (t) = Taken By, (c) = Cosigned By      Initials Name Provider Type    Tory Messer, PT Physical Therapist                                       Time Calculation:   Start Time: 1448  Stop Time: 1513  Time Calculation (min): 25 min  Therapy Charges for Today       Code Description Service Date Service Provider Modifiers Qty    [id number]  PT SELF PAY DRY NEEDLING SESSION 6/7/2023 Tory Carlos, PT  1                      Tory Carlos, PT  6/7/2023

## 2023-08-28 ENCOUNTER — OFFICE VISIT (OUTPATIENT)
Dept: MAMMOGRAPHY | Facility: CLINIC | Age: 23
End: 2023-08-28
Payer: COMMERCIAL

## 2023-08-28 VITALS
HEART RATE: 66 BPM | WEIGHT: 140 LBS | BODY MASS INDEX: 23.32 KG/M2 | SYSTOLIC BLOOD PRESSURE: 119 MMHG | DIASTOLIC BLOOD PRESSURE: 78 MMHG | OXYGEN SATURATION: 99 % | HEIGHT: 65 IN

## 2023-08-28 DIAGNOSIS — Z91.89 AT HIGH RISK FOR BREAST CANCER: Primary | ICD-10-CM

## 2023-08-28 NOTE — LETTER
August 28, 2023     Sammi Esquivel MD  [address]  [facility] [address]  [facility] [address]    Patient: Tran Quintanilla   YOB: 2000   Date of Visit: 8/28/2023     Dear Sammi Esquivel MD:       Thank you for referring Tran Quintanilla to me for evaluation. Below are the relevant portions of my assessment and plan of care.  Assessment:  1. At high risk for breast cancer    The patient is aware that high risk breast cancer patients are typically identified because of a strong family history for breast cancer, a genetic mutation, LCIS, atypical hyperplasia, or history of radiation treatment to the chest wall under the age of 30.  She has been genetically tested but there is no predisposition to breast cancer based on that testing.  Our risk assessment models of estimated her lifetime risk at 27%.  Her 5-year risk was not calculated due to her age.  Based on these findings, we would suggest alternating mammograms and MRIs when imaging is initiated.  As a rule of thumb, we often consider initiating imaging 10 years prior to the youngest person with breast cancer.  That is not particularly helpful in her case.  For now, we have elected to consider an MRI at the age of 28 and continue this yearly until 33 where we would also begin doing yearly mammograms.  The patient would also benefit from twice yearly clinical breast examination.    She is also aware of the benefits of limiting alcohol intake, exercise, and maintaining an ideal body weight.      Plan:  1.  I will see her back in the office in 1 year.  She knows to call me if she palpates anything of concern.  2.  We tentatively plan on obtaining an MRI when she turns 28 and then at 33 we will obtain MRIs and mammograms.    If you have questions, please do not hesitate to call me. I look forward to following Tran along with you.         Sincerely,        Christopher Geiger MD        CC: MYNOR Ryan William P, MD  08/28/23 5139  Sign when Signing  Visit  Chief Complaint: Tran Quintanilla is a 23 y.o.. female here today for Consult        History of Present Illness:  Patient presents with management of breast cancer risk.   She is a nice 23-year-old white female with a strong family history for breast cancer.  She has 2 paternal aunts diagnosed with breast cancer in the last year.  They were 54 years old and 64 years old respectively.  She also has a paternal uncle diagnosed with prostate cancer and a paternal grandfather with appendiceal cancer.  Her father and paternal grandmother both had melanoma as well.  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  This has been reclassified as a benign mutation.  There is no associated breast cancer risk with it.    The patient has not had any children up to this point.  She does have an IUD in place.    The patient has not had any imaging at this point in time.    The patient has not palpated anything of concern either.    Review of Systems:  Review of Systems   Skin:         The patient denies any noticeable changes to the skin of the breast.   All other systems reviewed and are negative.   I have reviewed the ROS as documented by the MA/LPN/RN Christopher Geiger MD      Past Medical and Surgical History:  Breast Biopsy History:  Patient has not had a breast biopsy in the past.  Breast Cancer HIstory:  Patient does not have a past medical history of breast cancer.  Breast Operations, and year:  0  Social History     Tobacco Use   Smoking Status Never   Smokeless Tobacco Never   Tobacco Comments    vaping     Obstetric History:  Patient is premenopausal, first day of last period: 7-3-23  Number of pregnancies:0  Number of live births: 0  Number of abortions or miscarriages: 0  Age of delivery of first child: 1  Patient did not breast feed.  Length of time taking birth control pills:9 yrs  Patient has never taken hormone replacement    Past Surgical History:    Procedure Laterality Date    COLONOSCOPY N/A 09/01/2022    Procedure: COLONOSCOPY TO CECUM AND TERMINAL ILEUM WITH BIOPSIES;  Surgeon: Kaleb Joyner MD;  Location:  [facility];  Service: Gastroenterology;  Laterality: N/A;  PRE- BLOOD IN STOOL  POST- HEMORRHOIDS, ANAL FISSURE    EAR TUBES      ENDOSCOPY N/A 09/01/2022    Procedure: ESOPHAGOGASTRODUODENOSCOPY WITH BIOPSIES;  Surgeon: Kaleb Joyner MD;  Location:  [facility];  Service: Gastroenterology;  Laterality: N/A;  PRE- ABDOMINAL PAIN  POST- MILD GASTRITIS    SIGMOIDOSCOPY N/A 03/11/2022    Procedure: FLEXIBLE SIGMOIDOSCOPY WITH BIOPSIES;  Surgeon: Baron Ayoub MD;  Location:  [facility];  Service: Gastroenterology;  Laterality: N/A;  Pre: rectal bleeding  Post: hemorrhoids    TONSILLECTOMY Bilateral 07/13/2021    Procedure: TONSILLECTOMY;  Surgeon: Duc Alvarez MD;  Location: [facility] MAIN OR;  Service: ENT;  Laterality: Bilateral;    WISDOM TOOTH EXTRACTION         Past Medical History:   Diagnosis Date    Anesthesia     BLOOD PRESSURE DROPPED POST-OP WITH EAR TUBES AGE 4    Anxiety     Deviated septum     History of Clostridium difficile colitis     NUMEROUS ANTIBIOTICS WITH STREP HX    History of strep sore throat     7 TIMES IN 2020    Migraines     Rectal bleeding        Prior Hospitalizations, other than for surgery or childbirth, and year:  None    Social History:  Patient is .  Patient has no children.    Family History:  Family History   Problem Relation Age of Onset    Diabetes Mother     Cancer Father         Melanoma    Hyperlipidemia Father     Hypertension Father     Epilepsy Father     Diabetes Maternal Grandmother         CHF & Diabetes    Cancer Paternal Grandfather         Melanoma    Migraines Paternal Aunt     Breast cancer Paternal Aunt     Breast cancer Paternal Aunt     Prostate cancer Paternal Uncle     Leukemia Paternal Uncle     Malig Hyperthermia Neg Hx     Colon  cancer Neg Hx     Colon polyps Neg Hx     Crohn's disease Neg Hx     Irritable bowel syndrome Neg Hx     Ulcerative colitis Neg Hx        Vital Signs:  Vitals:    08/28/23 1326   BP: 119/78   Pulse: 66   SpO2: 99%       Medications:    Current Outpatient Prescriptions:     Current Outpatient Medications:     acetaminophen (TYLENOL) 500 MG tablet, Take 2 tablets by mouth Every 6 (Six) Hours As Needed for Mild Pain., Disp: , Rfl:     baclofen (LIORESAL) 10 MG tablet, Take 1 (one) Tablet by mouth up to three times daily, as needed, Disp: 60 tablet, Rfl: 0    betamethasone, augmented, (DIPROLENE) 0.05 % cream, Apply twice a day to arms, legs (not face) as needed for rash, PMLE, Disp: 50 g, Rfl: 0    busPIRone (BUSPAR) 5 MG tablet, Take 1 tablet by mouth 3 (Three) Times a Day. (Patient taking differently: Take 1 tablet by mouth Daily As Needed.), Disp: 90 tablet, Rfl: 1    clindamycin (CLEOCIN T) 1 % lotion, Apply  topically to the appropriate area as directed Every Morning., Disp: , Rfl:     cyanocobalamin 1000 MCG/ML injection, Inject 1 mL into the appropriate muscle as directed by prescriber Every 14 (Fourteen) Days., Disp: 1 mL, Rfl: 3    cyclobenzaprine (FLEXERIL) 5 MG tablet, Take 1 tablet by mouth 3 (Three) Times a Day As Needed for Muscle Spasms., Disp: 30 tablet, Rfl: 0    dicyclomine (Bentyl) 10 MG capsule, Take 1 capsule by mouth 4 (Four) Times a Day Before Meals & at Bedtime., Disp: 90 capsule, Rfl: 0    Erenumab-aooe (Aimovig) 140 MG/ML auto-injector, Inject 1 mL under the skin into the appropriate area as directed Every 30 (Thirty) Days., Disp: 3 mL, Rfl: 1    Levonorgestrel (KYLEENA IU), by Intrauterine route., Disp: , Rfl:     montelukast (SINGULAIR) 10 MG tablet, Take 1 tablet by mouth every night at bedtime. (Patient not taking: Reported on 7/11/2023), Disp: 30 tablet, Rfl: 11    polycarbophil 625 MG tablet tablet, Take  by mouth Daily. (Patient not taking: Reported on 7/11/2023), Disp:  ", Rfl:     promethazine (PHENERGAN) 25 MG tablet, Take 1 tablet by mouth Every 6 (Six) Hours As Needed for Nausea or Vomiting., Disp: 30 tablet, Rfl: 0    spironolactone (ALDACTONE) 100 MG tablet, Take 1 tablet by mouth Daily., Disp: 30 tablet, Rfl: 5    SUMAtriptan (Imitrex) 50 MG tablet, Take one tablet at onset of headache. May repeat dose one time in 2 hours if headache not relieved., Disp: 9 tablet, Rfl: 0    Syringe/Needle, Disp, 25G X 5/8\" 3 ML misc, 1 each Every 28 (Twenty-Eight) Days., Disp: 100 each, Rfl: 0    topiramate (Topamax) 50 MG tablet, Take 1 tablet by mouth Daily., Disp: 90 tablet, Rfl: 3    tretinoin (RETIN-A) 0.025 % cream, Apply 1 application  topically to the appropriate area as directed Every Night., Disp: , Rfl:     Physical Examination:  General Appearance:   Patient is in no distress.  She is well kept and has a BMI of 23.3  Psychiatric:  Patient with appropriate mood and affect. Alert and oriented to self, time, and place.    Breast, RIGHT:  medium sized, symmetric with the contralateral side.  Breast skin is without erythema, edema, rashes.  There are no visible abnormalities upon inspection during the arm-raising maneuver or with hands on hips in the sitting position. There is no nipple retraction, discharge or nipple/areolar skin changes.There are no masses palpable in the sitting or supine positions.  The breast tissue is rather dense across the upper part of the breast.    Breast, LEFT:  medium sized, symmetric with the contralateral side.  Breast skin is without erythema, edema, rashes.  There are no visible abnormalities upon inspection during the arm-raising maneuver or with hands on hips in the sitting position. There is no nipple retraction, discharge or nipple/areolar skin changes.There are no masses palpable in the sitting or supine positions.  The majority of her density is across the upper part of the breast.    Lymphatic:  There is no axillary, cervical, " infraclavicular, or supraclavicular adenopathy bilaterally.    Gastrointestinal:  Abdomen is soft, nondistended, and nontender.  There was no obvious hepatosplenomegaly or abdominal mass.  There are no scars from previous surgery.    Musculoskeletal:  Good strength in all 4 extremities.   There is good range of motion in both shoulders.      Assessment:  1. At high risk for breast cancer    The patient is aware that high risk breast cancer patients are typically identified because of a strong family history for breast cancer, a genetic mutation, LCIS, atypical hyperplasia, or history of radiation treatment to the chest wall under the age of 30.  She has been genetically tested but there is no predisposition to breast cancer based on that testing.  Our risk assessment models of estimated her lifetime risk at 27%.  Her 5-year risk was not calculated due to her age.  Based on these findings, we would suggest alternating mammograms and MRIs when imaging is initiated.  As a rule of thumb, we often consider initiating imaging 10 years prior to the youngest person with breast cancer.  That is not particularly helpful in her case.  For now, we have elected to consider an MRI at the age of 28 and continue this yearly until 33 where we would also begin doing yearly mammograms.  The patient would also benefit from twice yearly clinical breast examination.    She is also aware of the benefits of limiting alcohol intake, exercise, and maintaining an ideal body weight.      Plan:  1.  I will see her back in the office in 1 year.  She knows to call me if she palpates anything of concern.  2.  We tentatively plan on obtaining an MRI when she turns 28 and then at 33 we will obtain MRIs and mammograms.      CPT coding:    Next Appointment:  No follow-ups on file.            EMR Dragon/transcription disclaimer:    Much of this encounter note is an electronic transcription/translocation of spoken language to printed text.  The  electronic translation of spoken language may permit erroneous, or at times, nonsensical words or phrases to be inadvertently transcribed.  Although I have reviewed the note from such areas, some may still exist.

## 2023-08-28 NOTE — PROGRESS NOTES
Chief Complaint: Tran Quintanilla is a 23 y.o.. female here today for Consult        History of Present Illness:  Patient presents with management of breast cancer risk.   She is a nice 23-year-old white female with a strong family history for breast cancer.  She has 2 paternal aunts diagnosed with breast cancer in the last year.  They were 54 years old and 64 years old respectively.  She also has a paternal uncle diagnosed with prostate cancer and a paternal grandfather with appendiceal cancer.  Her father and paternal grandmother both had melanoma as well.  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  This has been reclassified as a benign mutation.  There is no associated breast cancer risk with it.    The patient has not had any children up to this point.  She does have an IUD in place.    The patient has not had any imaging at this point in time.    The patient has not palpated anything of concern either.    Review of Systems:  Review of Systems   Skin:         The patient denies any noticeable changes to the skin of the breast.   All other systems reviewed and are negative.   I have reviewed the ROS as documented by the MA/LPN/RN Christopher Geiger MD      Past Medical and Surgical History:  Breast Biopsy History:  Patient has not had a breast biopsy in the past.  Breast Cancer HIstory:  Patient does not have a past medical history of breast cancer.  Breast Operations, and year:  0  Social History     Tobacco Use   Smoking Status Never   Smokeless Tobacco Never   Tobacco Comments    vaping     Obstetric History:  Patient is premenopausal, first day of last period: 7-3-23  Number of pregnancies:0  Number of live births: 0  Number of abortions or miscarriages: 0  Age of delivery of first child: 1  Patient did not breast feed.  Length of time taking birth control pills:9 yrs  Patient has never taken hormone replacement    Past Surgical History:   Procedure  Laterality Date    COLONOSCOPY N/A 09/01/2022    Procedure: COLONOSCOPY TO CECUM AND TERMINAL ILEUM WITH BIOPSIES;  Surgeon: Kaleb Joyner MD;  Location:  [facility];  Service: Gastroenterology;  Laterality: N/A;  PRE- BLOOD IN STOOL  POST- HEMORRHOIDS, ANAL FISSURE    EAR TUBES      ENDOSCOPY N/A 09/01/2022    Procedure: ESOPHAGOGASTRODUODENOSCOPY WITH BIOPSIES;  Surgeon: Kaleb Joyner MD;  Location:  [facility];  Service: Gastroenterology;  Laterality: N/A;  PRE- ABDOMINAL PAIN  POST- MILD GASTRITIS    SIGMOIDOSCOPY N/A 03/11/2022    Procedure: FLEXIBLE SIGMOIDOSCOPY WITH BIOPSIES;  Surgeon: Baron Ayoub MD;  Location:  [facility];  Service: Gastroenterology;  Laterality: N/A;  Pre: rectal bleeding  Post: hemorrhoids    TONSILLECTOMY Bilateral 07/13/2021    Procedure: TONSILLECTOMY;  Surgeon: Duc Alvarez MD;  Location: [facility] MAIN OR;  Service: ENT;  Laterality: Bilateral;    WISDOM TOOTH EXTRACTION         Past Medical History:   Diagnosis Date    Anesthesia     BLOOD PRESSURE DROPPED POST-OP WITH EAR TUBES AGE 4    Anxiety     Deviated septum     History of Clostridium difficile colitis     NUMEROUS ANTIBIOTICS WITH STREP HX    History of strep sore throat     7 TIMES IN 2020    Migraines     Rectal bleeding        Prior Hospitalizations, other than for surgery or childbirth, and year:  None    Social History:  Patient is .  Patient has no children.    Family History:  Family History   Problem Relation Age of Onset    Diabetes Mother     Cancer Father         Melanoma    Hyperlipidemia Father     Hypertension Father     Epilepsy Father     Diabetes Maternal Grandmother         CHF & Diabetes    Cancer Paternal Grandfather         Melanoma    Migraines Paternal Aunt     Breast cancer Paternal Aunt     Breast cancer Paternal Aunt     Prostate cancer Paternal Uncle     Leukemia Paternal Uncle     Malig Hyperthermia Neg Hx     Colon cancer Neg Hx     Colon polyps Neg Hx      Crohn's disease Neg Hx     Irritable bowel syndrome Neg Hx     Ulcerative colitis Neg Hx        Vital Signs:  Vitals:    08/28/23 1326   BP: 119/78   Pulse: 66   SpO2: 99%       Medications:    Current Outpatient Prescriptions:     Current Outpatient Medications:     acetaminophen (TYLENOL) 500 MG tablet, Take 2 tablets by mouth Every 6 (Six) Hours As Needed for Mild Pain., Disp: , Rfl:     baclofen (LIORESAL) 10 MG tablet, Take 1 (one) Tablet by mouth up to three times daily, as needed, Disp: 60 tablet, Rfl: 0    betamethasone, augmented, (DIPROLENE) 0.05 % cream, Apply twice a day to arms, legs (not face) as needed for rash, PMLE, Disp: 50 g, Rfl: 0    busPIRone (BUSPAR) 5 MG tablet, Take 1 tablet by mouth 3 (Three) Times a Day. (Patient taking differently: Take 1 tablet by mouth Daily As Needed.), Disp: 90 tablet, Rfl: 1    clindamycin (CLEOCIN T) 1 % lotion, Apply  topically to the appropriate area as directed Every Morning., Disp: , Rfl:     cyanocobalamin 1000 MCG/ML injection, Inject 1 mL into the appropriate muscle as directed by prescriber Every 14 (Fourteen) Days., Disp: 1 mL, Rfl: 3    cyclobenzaprine (FLEXERIL) 5 MG tablet, Take 1 tablet by mouth 3 (Three) Times a Day As Needed for Muscle Spasms., Disp: 30 tablet, Rfl: 0    dicyclomine (Bentyl) 10 MG capsule, Take 1 capsule by mouth 4 (Four) Times a Day Before Meals & at Bedtime., Disp: 90 capsule, Rfl: 0    Erenumab-aooe (Aimovig) 140 MG/ML auto-injector, Inject 1 mL under the skin into the appropriate area as directed Every 30 (Thirty) Days., Disp: 3 mL, Rfl: 1    Levonorgestrel (KYLEENA IU), by Intrauterine route., Disp: , Rfl:     montelukast (SINGULAIR) 10 MG tablet, Take 1 tablet by mouth every night at bedtime. (Patient not taking: Reported on 7/11/2023), Disp: 30 tablet, Rfl: 11    polycarbophil 625 MG tablet tablet, Take  by mouth Daily. (Patient not taking: Reported on 7/11/2023), Disp: , Rfl:     promethazine (PHENERGAN) 25 MG tablet, Take  "1 tablet by mouth Every 6 (Six) Hours As Needed for Nausea or Vomiting., Disp: 30 tablet, Rfl: 0    spironolactone (ALDACTONE) 100 MG tablet, Take 1 tablet by mouth Daily., Disp: 30 tablet, Rfl: 5    SUMAtriptan (Imitrex) 50 MG tablet, Take one tablet at onset of headache. May repeat dose one time in 2 hours if headache not relieved., Disp: 9 tablet, Rfl: 0    Syringe/Needle, Disp, 25G X 5/8\" 3 ML misc, 1 each Every 28 (Twenty-Eight) Days., Disp: 100 each, Rfl: 0    topiramate (Topamax) 50 MG tablet, Take 1 tablet by mouth Daily., Disp: 90 tablet, Rfl: 3    tretinoin (RETIN-A) 0.025 % cream, Apply 1 application  topically to the appropriate area as directed Every Night., Disp: , Rfl:     Physical Examination:  General Appearance:   Patient is in no distress.  She is well kept and has a BMI of 23.3  Psychiatric:  Patient with appropriate mood and affect. Alert and oriented to self, time, and place.    Breast, RIGHT:  medium sized, symmetric with the contralateral side.  Breast skin is without erythema, edema, rashes.  There are no visible abnormalities upon inspection during the arm-raising maneuver or with hands on hips in the sitting position. There is no nipple retraction, discharge or nipple/areolar skin changes.There are no masses palpable in the sitting or supine positions.  The breast tissue is rather dense across the upper part of the breast.    Breast, LEFT:  medium sized, symmetric with the contralateral side.  Breast skin is without erythema, edema, rashes.  There are no visible abnormalities upon inspection during the arm-raising maneuver or with hands on hips in the sitting position. There is no nipple retraction, discharge or nipple/areolar skin changes.There are no masses palpable in the sitting or supine positions.  The majority of her density is across the upper part of the breast.    Lymphatic:  There is no axillary, cervical, infraclavicular, or supraclavicular adenopathy " bilaterally.    Gastrointestinal:  Abdomen is soft, nondistended, and nontender.  There was no obvious hepatosplenomegaly or abdominal mass.  There are no scars from previous surgery.    Musculoskeletal:  Good strength in all 4 extremities.   There is good range of motion in both shoulders.      Assessment:  1. At high risk for breast cancer    The patient is aware that high risk breast cancer patients are typically identified because of a strong family history for breast cancer, a genetic mutation, LCIS, atypical hyperplasia, or history of radiation treatment to the chest wall under the age of 30.  She has been genetically tested but there is no predisposition to breast cancer based on that testing.  Our risk assessment models of estimated her lifetime risk at 27%.  Her 5-year risk was not calculated due to her age.  Based on these findings, we would suggest alternating mammograms and MRIs when imaging is initiated.  As a rule of thumb, we often consider initiating imaging 10 years prior to the youngest person with breast cancer.  That is not particularly helpful in her case.  For now, we have elected to consider an MRI at the age of 28 and continue this yearly until 33 where we would also begin doing yearly mammograms.  The patient would also benefit from twice yearly clinical breast examination.    She is also aware of the benefits of limiting alcohol intake, exercise, and maintaining an ideal body weight.      Plan:  1.  I will see her back in the office in 1 year.  She knows to call me if she palpates anything of concern.  2.  We tentatively plan on obtaining an MRI when she turns 28 and then at 33 we will obtain MRIs and mammograms.      CPT coding:    Next Appointment:  No follow-ups on file.            EMR Dragon/transcription disclaimer:    Much of this encounter note is an electronic transcription/translocation of spoken language to printed text.  The electronic translation of spoken language may permit  erroneous, or at times, nonsensical words or phrases to be inadvertently transcribed.  Although I have reviewed the note from such areas, some may still exist.

## 2023-09-05 ENCOUNTER — HOSPITAL ENCOUNTER (OUTPATIENT)
Dept: PHYSICAL THERAPY | Facility: HOSPITAL | Age: 23
Setting detail: THERAPIES SERIES
Discharge: HOME | End: 2023-09-05

## 2023-09-05 DIAGNOSIS — M54.2 CERVICAL MUSCLE PAIN: Primary | ICD-10-CM

## 2023-09-05 NOTE — THERAPY TREATMENT NOTE
Outpatient Physical Therapy Ortho Treatment Note  [facility]     Patient Name: Tran Quintanilla  : 2000  MRN: [id number]  Today's Date: 2023      Visit Date: 2023    Visit Dx:    ICD-10-CM ICD-9-CM   1. Cervical muscle pain  M54.2 723.1       Patient Active Problem List   Diagnosis    Migraine headache    IUD (intrauterine device) in place    Rectal bleeding    H/O Clostridium difficile infection    Abdominal pain    Diarrhea    Heartburn        Past Medical History:   Diagnosis Date    Anesthesia     BLOOD PRESSURE DROPPED POST-OP WITH EAR TUBES AGE 4    Anxiety     Deviated septum     History of Clostridium difficile colitis     NUMEROUS ANTIBIOTICS WITH STREP HX    History of strep sore throat     7 TIMES IN     Migraines     Rectal bleeding         Past Surgical History:   Procedure Laterality Date    COLONOSCOPY N/A 2022    Procedure: COLONOSCOPY TO CECUM AND TERMINAL ILEUM WITH BIOPSIES;  Surgeon: Kaleb Joyner MD;  Location: [facility] ENDOSCOPY;  Service: Gastroenterology;  Laterality: N/A;  PRE- BLOOD IN STOOL  POST- HEMORRHOIDS, ANAL FISSURE    EAR TUBES      ENDOSCOPY N/A 2022    Procedure: ESOPHAGOGASTRODUODENOSCOPY WITH BIOPSIES;  Surgeon: Kaleb Joyner MD;  Location: [facility] ENDOSCOPY;  Service: Gastroenterology;  Laterality: N/A;  PRE- ABDOMINAL PAIN  POST- MILD GASTRITIS    SIGMOIDOSCOPY N/A 2022    Procedure: FLEXIBLE SIGMOIDOSCOPY WITH BIOPSIES;  Surgeon: Baron Ayoub MD;  Location: [facility] ENDOSCOPY;  Service: Gastroenterology;  Laterality: N/A;  Pre: rectal bleeding  Post: hemorrhoids    TONSILLECTOMY Bilateral 2021    Procedure: TONSILLECTOMY;  Surgeon: Duc Alvarez MD;  Location: [facility] OR;  Service: ENT;  Laterality: Bilateral;    WISDOM TOOTH EXTRACTION                          PT Assessment/Plan       Row Name 23 1500          PT Assessment    Assessment Comments Ms. Quintanilla returns to PT after receiving DDN nearly 3  months ago and reports symptoms have overall improved. She does note fluctuation in symptoms and aware this is directly related to her postural habits. Patient previously signed written informed consent. Therefore, continued with DDN to L sided UT/LS. She continues to demo mild-moderate LTRs within LS/UT muscle tissues. Patient reports reduction in muscle tension immediately following DDN. She will return for future DDN as needed.  -OLIVER        PT Plan    PT Plan Comments follow up for DDN as needed  -OLIVER               User Key  (r) = Recorded By, (t) = Taken By, (c) = Cosigned By      Initials Name Provider Type    Tory Messer, PT Physical Therapist                                   Manual Rx (last 36 hours)       Manual Treatments       Row Name 09/05/23 1500             Manual Rx 1    Manual Rx 1 Location intramuscular manual therapy  -OLIVER      Manual Rx 1 Type dry-needling  -OLIVER      Manual Rx 1 Grade 25  -OLIVER    Tran Quintanilla was assessed for dry-needling and intramuscular manual therapy.     Reviewed risks/benefits of Dry Needling with patient, including but not limited to muscle soreness, bruising, vasovagal response, pneumothorax, nerve injury.     Patient previously signed written consent to proceed with treatment.     Patient position in R SLing during treatment and L sided UT/LS muscles treated. Patient responded mild-moderate LTRs.     Utilized palpation before, during, and after to facilitate assessment for trigger points and musclar integrity.     Clean needle technique observed at all times, precautions for lung fields, neurovascular structures observed.                 User Key  (r) = Recorded By, (t) = Taken By, (c) = Cosigned By      Initials Name Provider Type    Tory Messer, PT Physical Therapist                                       Time Calculation:   Start Time: 1445  Stop Time: 1510  Time Calculation (min): 25 min  Therapy Charges for Today       Code Description  Service Date Service Provider Modifiers Qty    [id number]  PT SELF PAY DRY NEEDLING SESSION 9/5/2023 Tory Carlos, PT  1                      Tory Carlos, PT  9/5/2023

## 2023-09-11 DIAGNOSIS — E53.8 B12 DEFICIENCY: ICD-10-CM

## 2023-09-11 RX ORDER — CYANOCOBALAMIN 1000 UG/ML
1000 INJECTION, SOLUTION INTRAMUSCULAR; SUBCUTANEOUS
Qty: 1 ML | Refills: 3 | Status: SHIPPED | OUTPATIENT
Start: 2023-09-11

## 2023-11-20 RX ORDER — CLINDAMYCIN PHOSPHATE 10 UG/ML
LOTION TOPICAL EVERY MORNING
Qty: 60 ML | Refills: 1 | Status: SHIPPED | OUTPATIENT
Start: 2023-11-20

## 2023-12-12 DIAGNOSIS — E53.8 B12 DEFICIENCY: ICD-10-CM

## 2023-12-12 RX ORDER — CYANOCOBALAMIN 1000 UG/ML
1000 INJECTION, SOLUTION INTRAMUSCULAR; SUBCUTANEOUS
Qty: 1 ML | Refills: 3 | Status: SHIPPED | OUTPATIENT
Start: 2023-12-12

## 2023-12-28 ENCOUNTER — PATIENT MESSAGE (OUTPATIENT)
Dept: INTERNAL MEDICINE | Facility: CLINIC | Age: 23
End: 2023-12-28
Payer: COMMERCIAL

## 2023-12-29 RX ORDER — BACLOFEN 10 MG/1
10 TABLET ORAL 3 TIMES DAILY
Qty: 60 TABLET | Refills: 0 | Status: SHIPPED | OUTPATIENT
Start: 2023-12-29

## 2024-01-19 ENCOUNTER — OFFICE VISIT (OUTPATIENT)
Dept: INTERNAL MEDICINE | Facility: CLINIC | Age: 24
End: 2024-01-19
Payer: COMMERCIAL

## 2024-01-19 VITALS
WEIGHT: 140 LBS | BODY MASS INDEX: 23.32 KG/M2 | HEIGHT: 65 IN | TEMPERATURE: 98 F | SYSTOLIC BLOOD PRESSURE: 116 MMHG | DIASTOLIC BLOOD PRESSURE: 68 MMHG

## 2024-01-19 DIAGNOSIS — L65.9 HAIR LOSS: ICD-10-CM

## 2024-01-19 DIAGNOSIS — G43.909 MIGRAINE WITHOUT STATUS MIGRAINOSUS, NOT INTRACTABLE, UNSPECIFIED MIGRAINE TYPE: ICD-10-CM

## 2024-01-19 DIAGNOSIS — R53.83 OTHER FATIGUE: Primary | ICD-10-CM

## 2024-01-19 PROCEDURE — 99214 OFFICE O/P EST MOD 30 MIN: CPT | Performed by: PHYSICIAN ASSISTANT

## 2024-01-19 NOTE — PROGRESS NOTES
Subjective   Chief Complaint   Patient presents with    b12 def    Migraine     6 month F/U       History of Present Illness     Tran is here today for a follow up of her migraines and B12 deficiency. Pt states she thinks topomax is making her hair fall out. Pt noticed her hair loss at the end of August. She states clumps fall out in the shower. She would like to see if the Amiovig is enough to maintain her migraines. Pt states she has related her migraines worsening after she got the IUD but got it out two weeks ago and they have seemed to be less frequent.    Patient states she has extreme fatigue for 2.5 months now. Fatigue makes it hard to drive 45 minutes to work. No motivation. Takes naps that are 2-3 hours. Pt averages 8 hours of sleep a night. Her anxiety has been manageable. Pt has no sx of sadness or depression. No medication association. Pt states she has been trying to eat healthier and it has not helped. Pt cut down her caffeine use to only 2 caffeine beverages a day.     No family hx of thyroid issues.      commented on her hair thinning, cleaning out her brush every 2 days. Tosses and turns in her sleep and wakes some but will go right back to sleeep.     Patient Active Problem List   Diagnosis    Migraine headache    IUD (intrauterine device) in place    Rectal bleeding    H/O Clostridium difficile infection    Abdominal pain    Diarrhea    Heartburn       No Known Allergies    Current Outpatient Medications on File Prior to Visit   Medication Sig Dispense Refill    acetaminophen (TYLENOL) 500 MG tablet Take 2 tablets by mouth Every 6 (Six) Hours As Needed for Mild Pain.      baclofen (LIORESAL) 10 MG tablet Take 1 (one) Tablet by mouth up to three times daily, as needed 60 tablet 0    betamethasone, augmented, (DIPROLENE) 0.05 % cream Apply twice a day to arms, legs (not face) as needed for rash, PMLE 50 g 0    busPIRone (BUSPAR) 5 MG tablet Take 1 tablet by mouth 3 (Three) Times a  "Day. (Patient taking differently: Take 1 tablet by mouth Daily As Needed.) 90 tablet 1    clindamycin (CLEOCIN T) 1 % lotion Apply  topically to the appropriate area as directed Every Morning. 60 mL 1    cyanocobalamin 1000 MCG/ML injection Inject 1 mL into the appropriate muscle as directed by prescriber Every 14 (Fourteen) Days. 1 mL 3    cyclobenzaprine (FLEXERIL) 5 MG tablet Take 1 tablet by mouth 3 (Three) Times a Day As Needed for Muscle Spasms. 30 tablet 0    dicyclomine (Bentyl) 10 MG capsule Take 1 capsule by mouth 4 (Four) Times a Day Before Meals & at Bedtime. 90 capsule 0    Erenumab-aooe (Aimovig) 140 MG/ML auto-injector Inject 1 mL under the skin into the appropriate area as directed Every 30 (Thirty) Days. 3 mL 1    Levonorgestrel (KYLEENA IU) by Intrauterine route.      promethazine (PHENERGAN) 25 MG tablet Take 1 tablet by mouth Every 6 (Six) Hours As Needed for Nausea or Vomiting. 30 tablet 0    spironolactone (ALDACTONE) 100 MG tablet Take 1 tablet by mouth Daily. 30 tablet 10    SUMAtriptan (Imitrex) 50 MG tablet Take one tablet at onset of headache. May repeat dose one time in 2 hours if headache not relieved. 9 tablet 0    Syringe/Needle, Disp, 25G X 5/8\" 3 ML misc Use 1 each Every 28 (Twenty-Eight) Days. 100 each 0    topiramate (Topamax) 50 MG tablet Take 1 tablet by mouth Daily. 90 tablet 3    tretinoin (RETIN-A) 0.025 % cream Apply 1 application  topically to the appropriate area as directed Every Night.      montelukast (SINGULAIR) 10 MG tablet Take 1 tablet by mouth every night at bedtime. (Patient not taking: Reported on 7/11/2023) 30 tablet 11    polycarbophil 625 MG tablet tablet Take  by mouth Daily. (Patient not taking: Reported on 7/11/2023)       No current facility-administered medications on file prior to visit.       Past Medical History:   Diagnosis Date    Anesthesia     BLOOD PRESSURE DROPPED POST-OP WITH EAR TUBES AGE 4    Anxiety     Deviated septum     History of " Clostridium difficile colitis     NUMEROUS ANTIBIOTICS WITH STREP HX    History of strep sore throat     7 TIMES IN 2020    Migraines     Rectal bleeding        Family History   Problem Relation Age of Onset    Diabetes Mother     Cancer Father         Melanoma    Hyperlipidemia Father     Hypertension Father     Epilepsy Father     Diabetes Maternal Grandmother         CHF & Diabetes    Cancer Paternal Grandfather         Melanoma    Migraines Paternal Aunt     Breast cancer Paternal Aunt     Breast cancer Paternal Aunt     Prostate cancer Paternal Uncle     Leukemia Paternal Uncle     Malig Hyperthermia Neg Hx     Colon cancer Neg Hx     Colon polyps Neg Hx     Crohn's disease Neg Hx     Irritable bowel syndrome Neg Hx     Ulcerative colitis Neg Hx        Social History     Socioeconomic History    Marital status:    Tobacco Use    Smoking status: Never    Smokeless tobacco: Never    Tobacco comments:     vaping   Vaping Use    Vaping Use: Former   Substance and Sexual Activity    Alcohol use: Yes     Comment: Social    Drug use: Never    Sexual activity: Yes     Partners: Male     Birth control/protection: Condom, I.U.D., Same-sex partner       Past Surgical History:   Procedure Laterality Date    COLONOSCOPY N/A 09/01/2022    Procedure: COLONOSCOPY TO CECUM AND TERMINAL ILEUM WITH BIOPSIES;  Surgeon: Kaleb Joyner MD;  Location: [facility] ENDOSCOPY;  Service: Gastroenterology;  Laterality: N/A;  PRE- BLOOD IN STOOL  POST- HEMORRHOIDS, ANAL FISSURE    EAR TUBES      ENDOSCOPY N/A 09/01/2022    Procedure: ESOPHAGOGASTRODUODENOSCOPY WITH BIOPSIES;  Surgeon: Kaleb Joyner MD;  Location: [facility] ENDOSCOPY;  Service: Gastroenterology;  Laterality: N/A;  PRE- ABDOMINAL PAIN  POST- MILD GASTRITIS    SIGMOIDOSCOPY N/A 03/11/2022    Procedure: FLEXIBLE SIGMOIDOSCOPY WITH BIOPSIES;  Surgeon: Baron Ayoub MD;  Location: [facility] ENDOSCOPY;  Service: Gastroenterology;  Laterality: N/A;  Pre: rectal  "bleeding  Post: hemorrhoids    TONSILLECTOMY Bilateral 07/13/2021    Procedure: TONSILLECTOMY;  Surgeon: Duc Alvarez MD;  Location: [facility];  Service: ENT;  Laterality: Bilateral;    WISDOM TOOTH EXTRACTION           The following portions of the patient's history were reviewed and updated as appropriate: problem list, allergies, current medications, past medical history, past family history, past social history, and past surgical history.    ROS    See HPI    Immunization History   Administered Date(s) Administered    COVID-19 (PFIZER) Purple Cap Monovalent 12/22/2020, 01/12/2021    DTaP 2000, 2000, 01/21/1999, 01/13/2000, 07/02/2002    Flu Vaccine Intradermal Quad 18-64YR 10/23/2007, 10/30/2015    Flu Vaccine Split Quad 02/28/2017, 10/06/2017, 09/20/2018, 09/24/2019    Fluzone (or Fluarix & Flulaval for VFC) >6mos 02/28/2017, 10/06/2017    Fluzone Quad >6mos (Multi-dose) 10/24/2001    HPV Quadrivalent 07/13/2009, 09/14/2009, 01/15/2010    Hep A / Hep B 01/02/2018, 02/07/2018, 07/12/2018    Hep A, 2 Dose 10/24/2008, 07/13/2009, 07/31/2009    Hep B, Adolescent or Pediatric 2000, 2000, 01/21/1999    Hib (HbOC) 2000, 2000, 01/21/1999, 01/13/2000    Hib (PRP-OMP) 2000, 2000, 01/21/1999, 01/13/2000    Hpv9 11/09/2017    IPV 2000, 1998, 01/13/2000, 07/12/2002    Influenza LAIV (Nasal) 10/24/2008    Influenza Quad Vaccine (Inpatient) 10/24/2001    MMR 10/11/1999, 07/12/2002    Meningococcal MCV4P (Menactra) 09/14/2009, 11/09/2017    OPV 2000, 2000, 01/13/2000    PPD Test 02/28/2017, 11/09/2017    Pneumococcal Conjugate 13-Valent (PCV13) 11/22/2000    Tdap 07/13/2009, 12/01/2017    Varicella 07/15/1999, 01/13/2000, 10/24/2008, 02/07/2018       Objective   Vitals:    01/19/24 1415   BP: 116/68   Temp: 98 °F (36.7 °C)   Weight: 63.5 kg (140 lb)   Height: 165.1 cm (65\")     Body mass index is 23.3 kg/m².  Physical Exam  Constitutional:       " Appearance: Normal appearance.   Cardiovascular:      Rate and Rhythm: Normal rate and regular rhythm.      Heart sounds: Normal heart sounds.   Pulmonary:      Effort: Pulmonary effort is normal.      Breath sounds: Normal breath sounds.   Lymphadenopathy:      Comments: Tug test negative   Neurological:      Mental Status: She is alert and oriented to person, place, and time.         Assessment & Plan   Diagnoses and all orders for this visit:    1. Other fatigue (Primary)  -     CBC & Differential  -     Ferritin  -     Iron Profile  -     TSH  -     Vitamin B12    2. Hair loss  -     CBC & Differential  -     Ferritin  -     Iron Profile  -     TSH    3. Migraine without status migrainosus, not intractable, unspecified migraine type  Comments:  She would like to try weaning off Topamax now that her migraines are well controlled. Will wean over th next 2 weeks     Workup causes of hair loss and fatigue. If all normal will refer for a sleep study. Appears she is having fragmented sleep.     I have reviewed the notes, assessments, and/or procedures performed by Tory SCHREIBER, I concur with her/his documentation of Tran Quintanilla.      Return in 6 months (on 7/19/2024) for Lab Today.    Tory SCHREIBER

## 2024-01-20 LAB
BASOPHILS # BLD AUTO: 0.04 10*3/MM3 (ref 0–0.2)
BASOPHILS NFR BLD AUTO: 0.6 % (ref 0–1.5)
EOSINOPHIL # BLD AUTO: 0.1 10*3/MM3 (ref 0–0.4)
EOSINOPHIL NFR BLD AUTO: 1.4 % (ref 0.3–6.2)
ERYTHROCYTE [DISTWIDTH] IN BLOOD BY AUTOMATED COUNT: 11.9 % (ref 12.3–15.4)
FERRITIN SERPL-MCNC: 89.8 NG/ML (ref 13–150)
HCT VFR BLD AUTO: 41.1 % (ref 34–46.6)
HGB BLD-MCNC: 14 G/DL (ref 12–15.9)
IMM GRANULOCYTES # BLD AUTO: 0.01 10*3/MM3 (ref 0–0.05)
IMM GRANULOCYTES NFR BLD AUTO: 0.1 % (ref 0–0.5)
IRON SATN MFR SERPL: 19 % (ref 20–50)
IRON SERPL-MCNC: 87 MCG/DL (ref 37–145)
LYMPHOCYTES # BLD AUTO: 1.78 10*3/MM3 (ref 0.7–3.1)
LYMPHOCYTES NFR BLD AUTO: 24.7 % (ref 19.6–45.3)
MCH RBC QN AUTO: 31.5 PG (ref 26.6–33)
MCHC RBC AUTO-ENTMCNC: 34.1 G/DL (ref 31.5–35.7)
MCV RBC AUTO: 92.6 FL (ref 79–97)
MONOCYTES # BLD AUTO: 0.44 10*3/MM3 (ref 0.1–0.9)
MONOCYTES NFR BLD AUTO: 6.1 % (ref 5–12)
NEUTROPHILS # BLD AUTO: 4.85 10*3/MM3 (ref 1.7–7)
NEUTROPHILS NFR BLD AUTO: 67.1 % (ref 42.7–76)
NRBC BLD AUTO-RTO: 0 /100 WBC (ref 0–0.2)
PLATELET # BLD AUTO: 297 10*3/MM3 (ref 140–450)
RBC # BLD AUTO: 4.44 10*6/MM3 (ref 3.77–5.28)
TIBC SERPL-MCNC: 451 MCG/DL
TSH SERPL DL<=0.005 MIU/L-ACNC: 1.51 UIU/ML (ref 0.27–4.2)
UIBC SERPL-MCNC: 364 MCG/DL (ref 112–346)
VIT B12 SERPL-MCNC: 699 PG/ML (ref 211–946)
WBC # BLD AUTO: 7.22 10*3/MM3 (ref 3.4–10.8)

## 2024-01-23 DIAGNOSIS — R40.0 DAYTIME SOMNOLENCE: Primary | ICD-10-CM

## 2024-02-05 ENCOUNTER — HOSPITAL ENCOUNTER (EMERGENCY)
Facility: HOSPITAL | Age: 24
Discharge: HOME | End: 2024-02-05
Attending: EMERGENCY MEDICINE | Admitting: EMERGENCY MEDICINE
Payer: COMMERCIAL

## 2024-02-05 VITALS
RESPIRATION RATE: 16 BRPM | BODY MASS INDEX: 23.32 KG/M2 | WEIGHT: 140 LBS | HEART RATE: 81 BPM | SYSTOLIC BLOOD PRESSURE: 112 MMHG | DIASTOLIC BLOOD PRESSURE: 66 MMHG | HEIGHT: 65 IN | TEMPERATURE: 97 F | OXYGEN SATURATION: 99 %

## 2024-02-05 DIAGNOSIS — R42 DIZZINESS: Primary | ICD-10-CM

## 2024-02-05 DIAGNOSIS — Z86.16 HISTORY OF COVID-19: ICD-10-CM

## 2024-02-05 DIAGNOSIS — R11.2 NAUSEA AND VOMITING, UNSPECIFIED VOMITING TYPE: ICD-10-CM

## 2024-02-05 LAB
ALBUMIN SERPL-MCNC: 4.7 G/DL (ref 3.5–5.2)
ALBUMIN/GLOB SERPL: 1.7 G/DL
ALP SERPL-CCNC: 63 U/L (ref 39–117)
ALT SERPL W P-5'-P-CCNC: 16 U/L (ref 1–33)
ANION GAP SERPL CALCULATED.3IONS-SCNC: 11.9 MMOL/L (ref 5–15)
AST SERPL-CCNC: 23 U/L (ref 1–32)
B-HCG UR QL: NEGATIVE
BACTERIA UR QL AUTO: ABNORMAL /HPF
BASOPHILS # BLD AUTO: 0.03 10*3/MM3 (ref 0–0.2)
BASOPHILS NFR BLD AUTO: 0.7 % (ref 0–1.5)
BILIRUB SERPL-MCNC: 0.5 MG/DL (ref 0–1.2)
BILIRUB UR QL STRIP: NEGATIVE
BUN SERPL-MCNC: 8 MG/DL (ref 6–20)
BUN/CREAT SERPL: 10.8 (ref 7–25)
CALCIUM SPEC-SCNC: 9.2 MG/DL (ref 8.6–10.5)
CHLORIDE SERPL-SCNC: 102 MMOL/L (ref 98–107)
CLARITY UR: CLEAR
CO2 SERPL-SCNC: 27.1 MMOL/L (ref 22–29)
COLOR UR: YELLOW
CREAT SERPL-MCNC: 0.74 MG/DL (ref 0.57–1)
DEPRECATED RDW RBC AUTO: 41.2 FL (ref 37–54)
EGFRCR SERPLBLD CKD-EPI 2021: 115.3 ML/MIN/1.73
EOSINOPHIL # BLD AUTO: 0.16 10*3/MM3 (ref 0–0.4)
EOSINOPHIL NFR BLD AUTO: 3.8 % (ref 0.3–6.2)
ERYTHROCYTE [DISTWIDTH] IN BLOOD BY AUTOMATED COUNT: 11.8 % (ref 12.3–15.4)
GLOBULIN UR ELPH-MCNC: 2.7 GM/DL
GLUCOSE BLDC GLUCOMTR-MCNC: 99 MG/DL (ref 70–130)
GLUCOSE SERPL-MCNC: 120 MG/DL (ref 65–99)
GLUCOSE UR STRIP-MCNC: NEGATIVE MG/DL
HCG SERPL QL: NEGATIVE
HCT VFR BLD AUTO: 40.3 % (ref 34–46.6)
HGB BLD-MCNC: 13.2 G/DL (ref 12–15.9)
HGB UR QL STRIP.AUTO: NEGATIVE
HOLD SPECIMEN: NORMAL
HYALINE CASTS UR QL AUTO: ABNORMAL /LPF
IMM GRANULOCYTES # BLD AUTO: 0.01 10*3/MM3 (ref 0–0.05)
IMM GRANULOCYTES NFR BLD AUTO: 0.2 % (ref 0–0.5)
KETONES UR QL STRIP: NEGATIVE
LEUKOCYTE ESTERASE UR QL STRIP.AUTO: ABNORMAL
LYMPHOCYTES # BLD AUTO: 1.84 10*3/MM3 (ref 0.7–3.1)
LYMPHOCYTES NFR BLD AUTO: 43.7 % (ref 19.6–45.3)
MCH RBC QN AUTO: 31.1 PG (ref 26.6–33)
MCHC RBC AUTO-ENTMCNC: 32.8 G/DL (ref 31.5–35.7)
MCV RBC AUTO: 95 FL (ref 79–97)
MONOCYTES # BLD AUTO: 0.23 10*3/MM3 (ref 0.1–0.9)
MONOCYTES NFR BLD AUTO: 5.5 % (ref 5–12)
NEUTROPHILS NFR BLD AUTO: 1.94 10*3/MM3 (ref 1.7–7)
NEUTROPHILS NFR BLD AUTO: 46.1 % (ref 42.7–76)
NITRITE UR QL STRIP: NEGATIVE
NRBC BLD AUTO-RTO: 0 /100 WBC (ref 0–0.2)
PH UR STRIP.AUTO: 6.5 [PH] (ref 5–8)
PLATELET # BLD AUTO: 197 10*3/MM3 (ref 140–450)
PMV BLD AUTO: 9.9 FL (ref 6–12)
POTASSIUM SERPL-SCNC: 3.4 MMOL/L (ref 3.5–5.2)
PROT SERPL-MCNC: 7.4 G/DL (ref 6–8.5)
PROT UR QL STRIP: NEGATIVE
QT INTERVAL: 373 MS
QTC INTERVAL: 433 MS
RBC # BLD AUTO: 4.24 10*6/MM3 (ref 3.77–5.28)
RBC # UR STRIP: ABNORMAL /HPF
REF LAB TEST METHOD: ABNORMAL
SODIUM SERPL-SCNC: 141 MMOL/L (ref 136–145)
SP GR UR STRIP: 1.01 (ref 1–1.03)
SQUAMOUS #/AREA URNS HPF: ABNORMAL /HPF
UROBILINOGEN UR QL STRIP: ABNORMAL
WBC # UR STRIP: ABNORMAL /HPF
WBC NRBC COR # BLD AUTO: 4.21 10*3/MM3 (ref 3.4–10.8)
WHOLE BLOOD HOLD COAG: NORMAL
WHOLE BLOOD HOLD COAG: NORMAL
WHOLE BLOOD HOLD SPECIMEN: NORMAL
WHOLE BLOOD HOLD SPECIMEN: NORMAL

## 2024-02-05 PROCEDURE — 93010 ELECTROCARDIOGRAM REPORT: CPT | Performed by: INTERNAL MEDICINE

## 2024-02-05 PROCEDURE — 96375 TX/PRO/DX INJ NEW DRUG ADDON: CPT

## 2024-02-05 PROCEDURE — 81025 URINE PREGNANCY TEST: CPT | Performed by: EMERGENCY MEDICINE

## 2024-02-05 PROCEDURE — 96374 THER/PROPH/DIAG INJ IV PUSH: CPT

## 2024-02-05 PROCEDURE — 80053 COMPREHEN METABOLIC PANEL: CPT | Performed by: EMERGENCY MEDICINE

## 2024-02-05 PROCEDURE — 82948 REAGENT STRIP/BLOOD GLUCOSE: CPT

## 2024-02-05 PROCEDURE — 25010000002 ONDANSETRON PER 1 MG: Performed by: EMERGENCY MEDICINE

## 2024-02-05 PROCEDURE — 25810000003 LACTATED RINGERS SOLUTION: Performed by: EMERGENCY MEDICINE

## 2024-02-05 PROCEDURE — 81001 URINALYSIS AUTO W/SCOPE: CPT | Performed by: EMERGENCY MEDICINE

## 2024-02-05 PROCEDURE — 84703 CHORIONIC GONADOTROPIN ASSAY: CPT | Performed by: EMERGENCY MEDICINE

## 2024-02-05 PROCEDURE — 93005 ELECTROCARDIOGRAM TRACING: CPT | Performed by: EMERGENCY MEDICINE

## 2024-02-05 PROCEDURE — 25010000002 LORAZEPAM PER 2 MG: Performed by: EMERGENCY MEDICINE

## 2024-02-05 PROCEDURE — 99284 EMERGENCY DEPT VISIT MOD MDM: CPT

## 2024-02-05 PROCEDURE — 36415 COLL VENOUS BLD VENIPUNCTURE: CPT

## 2024-02-05 PROCEDURE — 85025 COMPLETE CBC W/AUTO DIFF WBC: CPT | Performed by: EMERGENCY MEDICINE

## 2024-02-05 RX ORDER — LORAZEPAM 2 MG/ML
1 INJECTION INTRAMUSCULAR ONCE
Status: COMPLETED | OUTPATIENT
Start: 2024-02-05 | End: 2024-02-05

## 2024-02-05 RX ORDER — MECLIZINE HYDROCHLORIDE 25 MG/1
25 TABLET ORAL ONCE
Status: COMPLETED | OUTPATIENT
Start: 2024-02-05 | End: 2024-02-05

## 2024-02-05 RX ORDER — ONDANSETRON 4 MG/1
4 TABLET, ORALLY DISINTEGRATING ORAL EVERY 8 HOURS PRN
Qty: 15 TABLET | Refills: 0 | Status: SHIPPED | OUTPATIENT
Start: 2024-02-05 | End: 2024-02-10

## 2024-02-05 RX ORDER — ONDANSETRON 2 MG/ML
4 INJECTION INTRAMUSCULAR; INTRAVENOUS ONCE
Status: COMPLETED | OUTPATIENT
Start: 2024-02-05 | End: 2024-02-05

## 2024-02-05 RX ORDER — MECLIZINE HYDROCHLORIDE 25 MG/1
25 TABLET ORAL 3 TIMES DAILY PRN
Qty: 30 TABLET | Refills: 0 | Status: SHIPPED | OUTPATIENT
Start: 2024-02-05

## 2024-02-05 RX ORDER — SODIUM CHLORIDE 0.9 % (FLUSH) 0.9 %
10 SYRINGE (ML) INJECTION AS NEEDED
Status: DISCONTINUED | OUTPATIENT
Start: 2024-02-05 | End: 2024-02-05 | Stop reason: HOSPADM

## 2024-02-05 RX ORDER — METHYLPREDNISOLONE 4 MG/1
TABLET ORAL
Qty: 21 EACH | Refills: 0 | Status: SHIPPED | OUTPATIENT
Start: 2024-02-05

## 2024-02-05 RX ADMIN — SODIUM CHLORIDE, POTASSIUM CHLORIDE, SODIUM LACTATE AND CALCIUM CHLORIDE 1000 ML: 600; 310; 30; 20 INJECTION, SOLUTION INTRAVENOUS at 11:11

## 2024-02-05 RX ADMIN — LORAZEPAM 1 MG: 2 INJECTION INTRAMUSCULAR; INTRAVENOUS at 11:12

## 2024-02-05 RX ADMIN — MECLIZINE HYDROCHLORIDE 25 MG: 25 TABLET ORAL at 12:49

## 2024-02-05 RX ADMIN — ONDANSETRON 4 MG: 2 INJECTION INTRAMUSCULAR; INTRAVENOUS at 11:11

## 2024-02-05 NOTE — DISCHARGE INSTRUCTIONS
Take medications as prescribed, stay well-hydrated, follow-up with your PCP for recheck as needed, ED return for worsening symptoms as needed.

## 2024-02-05 NOTE — ED NOTES
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

## 2024-02-05 NOTE — ED PROVIDER NOTES
EMERGENCY DEPARTMENT ENCOUNTER    Room Number:  HC1/E  PCP: Ruby Rangel PA-C  Historian: Patient      HPI:  Chief Complaint: Dizziness, vomiting  A complete HPI/ROS/PMH/PSH/SH/FH are unobtainable due to: None    Context: Tran Quintanilla is a 23 y.o. 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  Patient was diagnosed with COVID on Wednesday, had congestion, sinusitis that had since improved.  He was back to work for the first time today when she all of a sudden got positionally lightheaded and dizzy with vomiting.  At rest the dizziness settles down, with eye movement or head positional changes it does aggravate.  No prior issues with dizziness.      MEDICAL RECORD REVIEW    External (non-ED) record review: Chart review in epic shows that the patient did undergo tilt table testing back in 2018 with Cardiology for positional dizziness, and conclusion with a negative tilt table study for neurocardiogenic syncope              PAST MEDICAL HISTORY  Active Ambulatory Problems     Diagnosis Date Noted    Migraine headache 09/29/2020    IUD (intrauterine device) in place 03/03/2020    Rectal bleeding 12/23/2021    H/O Clostridium difficile infection 07/11/2022    Abdominal pain 07/11/2022    Diarrhea 07/11/2022    Heartburn 07/11/2022     Resolved Ambulatory Problems     Diagnosis Date Noted    Acute maxillary sinusitis 01/28/2020    Encounter for contraceptive management, unspecified 04/13/2016     Past Medical History:   Diagnosis Date    Anesthesia     Anxiety     Deviated septum     History of Clostridium difficile colitis     History of strep sore throat     Migraines          PAST SURGICAL HISTORY  Past Surgical History:   Procedure Laterality Date    COLONOSCOPY N/A 09/01/2022    Procedure: COLONOSCOPY TO CECUM AND TERMINAL ILEUM WITH BIOPSIES;  Surgeon: Kaleb Joyner MD;  Location: [facility] ENDOSCOPY;  Service: Gastroenterology;   Laterality: N/A;  PRE- BLOOD IN STOOL  POST- HEMORRHOIDS, ANAL FISSURE    EAR TUBES      ENDOSCOPY N/A 09/01/2022    Procedure: ESOPHAGOGASTRODUODENOSCOPY WITH BIOPSIES;  Surgeon: Kaleb Joyner MD;  Location: [facility] ENDOSCOPY;  Service: Gastroenterology;  Laterality: N/A;  PRE- ABDOMINAL PAIN  POST- MILD GASTRITIS    SIGMOIDOSCOPY N/A 03/11/2022    Procedure: FLEXIBLE SIGMOIDOSCOPY WITH BIOPSIES;  Surgeon: Baron Ayoub MD;  Location: [facility] ENDOSCOPY;  Service: Gastroenterology;  Laterality: N/A;  Pre: rectal bleeding  Post: hemorrhoids    TONSILLECTOMY Bilateral 07/13/2021    Procedure: TONSILLECTOMY;  Surgeon: Duc Alvarez MD;  Location: [facility] MAIN OR;  Service: ENT;  Laterality: Bilateral;    WISDOM TOOTH EXTRACTION           FAMILY HISTORY  Family History   Problem Relation Age of Onset    Diabetes Mother     Cancer Father         Melanoma    Hyperlipidemia Father     Hypertension Father     Epilepsy Father     Diabetes Maternal Grandmother         CHF & Diabetes    Cancer Paternal Grandfather         Melanoma    Migraines Paternal Aunt     Breast cancer Paternal Aunt     Breast cancer Paternal Aunt     Prostate cancer Paternal Uncle     Leukemia Paternal Uncle     Malig Hyperthermia Neg Hx     Colon cancer Neg Hx     Colon polyps Neg Hx     Crohn's disease Neg Hx     Irritable bowel syndrome Neg Hx     Ulcerative colitis Neg Hx          SOCIAL HISTORY  Social History     Socioeconomic History    Marital status:    Tobacco Use    Smoking status: Never    Smokeless tobacco: Never    Tobacco comments:     vaping   Vaping Use    Vaping Use: Former   Substance and Sexual Activity    Alcohol use: Yes     Comment: Social    Drug use: Never    Sexual activity: Yes     Partners: Male     Birth control/protection: Condom, I.U.D., Same-sex partner         ALLERGIES  Patient has no known allergies.        REVIEW OF SYSTEMS  Review of Systems     All systems reviewed and negative except for those  discussed in HPI.       PHYSICAL EXAM    I have reviewed the triage vital signs and nursing notes.    ED Triage Vitals   Temp Heart Rate Resp BP SpO2   02/05/24 0856 02/05/24 0856 02/05/24 0902 02/05/24 0901 02/05/24 0856   97 °F (36.1 °C) (!) 125 14 147/73 99 %      Temp src Heart Rate Source Patient Position BP Location FiO2 (%)   02/05/24 0856 02/05/24 0856 -- -- --   Tympanic Monitor          Physical Exam  General: No acute distress, nontoxic  HEENT: Mucous membranes moist, atraumatic, EOMI  Neck: Full ROM  Pulm: Symmetric chest rise, nonlabored, lungs CTAB  Cardiovascular: Regular rate and rhythm, intact distal pulses  GI: Soft, nontender, nondistended, no rebound, no guarding, bowel sounds present  MSK: Full ROM, no deformity  Skin: Warm, dry  Neuro: Awake, alert, oriented x 4, GCS 15, moving all extremities, no focal deficits  Psych: Calm, cooperative    I wore an N95 mask, eye protection, and gloves used during this encounter.       LAB RESULTS  Recent Results (from the past 24 hour(s))   Comprehensive Metabolic Panel    Collection Time: 02/05/24  9:16 AM    Specimen: Blood   Result Value Ref Range    Glucose 120 (H) 65 - 99 mg/dL    BUN 8 6 - 20 mg/dL    Creatinine 0.74 0.57 - 1.00 mg/dL    Sodium 141 136 - 145 mmol/L    Potassium 3.4 (L) 3.5 - 5.2 mmol/L    Chloride 102 98 - 107 mmol/L    CO2 27.1 22.0 - 29.0 mmol/L    Calcium 9.2 8.6 - 10.5 mg/dL    Total Protein 7.4 6.0 - 8.5 g/dL    Albumin 4.7 3.5 - 5.2 g/dL    ALT (SGPT) 16 1 - 33 U/L    AST (SGOT) 23 1 - 32 U/L    Alkaline Phosphatase 63 39 - 117 U/L    Total Bilirubin 0.5 0.0 - 1.2 mg/dL    Globulin 2.7 gm/dL    A/G Ratio 1.7 g/dL    BUN/Creatinine Ratio 10.8 7.0 - 25.0    Anion Gap 11.9 5.0 - 15.0 mmol/L    eGFR 115.3 >60.0 mL/min/1.73   hCG, Serum, Qualitative    Collection Time: 02/05/24  9:16 AM    Specimen: Blood   Result Value Ref Range    HCG Qualitative Negative Negative   Green Top (Gel)    Collection Time: 02/05/24  9:16 AM   Result Value  Ref Range    Extra Tube Hold for add-ons.    Lavender Top    Collection Time: 02/05/24  9:16 AM   Result Value Ref Range    Extra Tube hold for add-on    Light Blue Top    Collection Time: 02/05/24  9:16 AM   Result Value Ref Range    Extra Tube Hold for add-ons.    CBC Auto Differential    Collection Time: 02/05/24  9:16 AM    Specimen: Blood   Result Value Ref Range    WBC 4.21 3.40 - 10.80 10*3/mm3    RBC 4.24 3.77 - 5.28 10*6/mm3    Hemoglobin 13.2 12.0 - 15.9 g/dL    Hematocrit 40.3 34.0 - 46.6 %    MCV 95.0 79.0 - 97.0 fL    MCH 31.1 26.6 - 33.0 pg    MCHC 32.8 31.5 - 35.7 g/dL    RDW 11.8 (L) 12.3 - 15.4 %    RDW-SD 41.2 37.0 - 54.0 fl    MPV 9.9 6.0 - 12.0 fL    Platelets 197 140 - 450 10*3/mm3    Neutrophil % 46.1 42.7 - 76.0 %    Lymphocyte % 43.7 19.6 - 45.3 %    Monocyte % 5.5 5.0 - 12.0 %    Eosinophil % 3.8 0.3 - 6.2 %    Basophil % 0.7 0.0 - 1.5 %    Immature Grans % 0.2 0.0 - 0.5 %    Neutrophils, Absolute 1.94 1.70 - 7.00 10*3/mm3    Lymphocytes, Absolute 1.84 0.70 - 3.10 10*3/mm3    Monocytes, Absolute 0.23 0.10 - 0.90 10*3/mm3    Eosinophils, Absolute 0.16 0.00 - 0.40 10*3/mm3    Basophils, Absolute 0.03 0.00 - 0.20 10*3/mm3    Immature Grans, Absolute 0.01 0.00 - 0.05 10*3/mm3    nRBC 0.0 0.0 - 0.2 /100 WBC   Urinalysis With Microscopic If Indicated (No Culture) - Urine, Clean Catch    Collection Time: 02/05/24  9:17 AM    Specimen: Urine, Clean Catch   Result Value Ref Range    Color, UA Yellow Yellow, Straw    Appearance, UA Clear Clear    pH, UA 6.5 5.0 - 8.0    Specific Gravity, UA 1.007 1.005 - 1.030    Glucose, UA Negative Negative    Ketones, UA Negative Negative    Bilirubin, UA Negative Negative    Blood, UA Negative Negative    Protein, UA Negative Negative    Leuk Esterase, UA Trace (A) Negative    Nitrite, UA Negative Negative    Urobilinogen, UA 0.2 E.U./dL 0.2 - 1.0 E.U./dL   Pregnancy, Urine - Urine, Clean Catch    Collection Time: 02/05/24  9:17 AM    Specimen: Urine, Clean Catch    Result Value Ref Range    HCG, Urine QL Negative Negative   Urinalysis, Microscopic Only - Urine, Clean Catch    Collection Time: 02/05/24  9:17 AM    Specimen: Urine, Clean Catch   Result Value Ref Range    RBC, UA 0-2 None Seen, 0-2 /HPF    WBC, UA 3-5 (A) None Seen, 0-2 /HPF    Bacteria, UA None Seen None Seen /HPF    Squamous Epithelial Cells, UA 0-2 None Seen, 0-2 /HPF    Hyaline Casts, UA 0-2 None Seen /LPF    Methodology Automated Microscopy    ECG 12 Lead ED Triage Standing Order; Weak / Dizzy / AMS    Collection Time: 02/05/24 11:02 AM   Result Value Ref Range    QT Interval 373 ms    QTC Interval 433 ms   Green Top (Gel)    Collection Time: 02/05/24 11:15 AM   Result Value Ref Range    Extra Tube Hold for add-ons.    Lavender Top    Collection Time: 02/05/24 11:15 AM   Result Value Ref Range    Extra Tube hold for add-on    Gold Top - SST    Collection Time: 02/05/24 11:15 AM   Result Value Ref Range    Extra Tube Hold for add-ons.    Light Blue Top    Collection Time: 02/05/24 11:15 AM   Result Value Ref Range    Extra Tube Hold for add-ons.    POC Glucose Once    Collection Time: 02/05/24 11:53 AM    Specimen: Blood   Result Value Ref Range    Glucose 99 70 - 130 mg/dL       Ordered the above labs and independently interpreted results. My findings will be discussed in the medical decision making section below        RADIOLOGY  No Radiology Exams Resulted Within Past 24 Hours    Ordered the above noted radiological studies.  Independently interpreted by me and my independent review of findings can be found in the ED Course.  See dictation for official radiology interpretation.      PROCEDURES    Procedures      EKG - Per my independent interpretation at 1105:    EKG Time: 1102  Rhythm/Rate: Sinus rhythm with a rate of 81  Normal axis  Normal intervals  No acute ischemic changes  No STEMI       No emergent changes compared to January 23, 2022      MEDICATIONS GIVEN IN ER    Medications   ondansetron  (ZOFRAN) injection 4 mg (4 mg Intravenous Given 2/5/24 1111)   lactated ringers bolus 1,000 mL (0 mL Intravenous Stopped 2/5/24 1248)   LORazepam (ATIVAN) injection 1 mg (1 mg Intravenous Given 2/5/24 1112)   meclizine (ANTIVERT) tablet 25 mg (25 mg Oral Given 2/5/24 1249)         PROGRESS, DATA ANALYSIS, CONSULTS, AND MEDICAL DECISION MAKING    Please note that this section constitutes my independent interpretation of clinical data including lab results, radiology, EKG's.  This constitutes my independent professional opinion regarding differential diagnosis and management of this patient.  It may include any factors such as history from outside sources, review of external records, social determinants of health, management of medications, response to those treatments, and discussions with other providers.    Differential Diagnosis and Plan: Initial concern for BPPV, vestibular neuritis, dehydration, electrolyte abnormalities, arrhythmia, anemia, renal failure, among others.  Plan for labs, EKG, IV fluids, supportive care, reevaluation with results.  No significant concerns for acute CVA or intracranial hemorrhage at this time.    Additional sources:  - Discussed/ obtained information from independent historians:       - (Social Determinants of Health): None     - Shared decision making:  Patient fully updated on and in agreement with the course and plan moving forward    ED Course as of 02/05/24 1830   Mon Feb 05, 2024   1032 Glucose(!): 120 [DC]   1033 BUN: 8 [DC]   1033 Creatinine: 0.74 [DC]   1033 Sodium: 141 [DC]   1033 Potassium(!): 3.4 [DC]   1033 ALT (SGPT): 16 [DC]   1033 AST (SGOT): 23 [DC]   1033 Alkaline Phosphatase: 63 [DC]   1033 Total Bilirubin: 0.5 [DC]   1033 Nitrite, UA: Negative [DC]   1033 Leukocytes, UA(!): Trace [DC]   1033 Blood, UA: Negative [DC]   1033 Ketones, UA: Negative [DC]   1033 Bacteria, UA: None Seen [DC]   1033 WBC, UA(!): 3-5 [DC]   1033 RBC, UA: 0-2 [DC]   1033 HCG Qualitative:  Negative [DC]   1033 WBC: 4.21 [DC]   1033 Hemoglobin: 13.2 [DC]   1151 Nitrite, UA: Negative [DC]   1151 Leukocytes, UA(!): Trace [DC]   1151 Ketones, UA: Negative [DC]   1151 Bacteria, UA: None Seen [DC]   1151 WBC, UA(!): 3-5 [DC]   1151 RBC, UA: 0-2 [DC]   1220 On reevaluation patient is feeling somewhat better, I do think that this is likely more vestibular neuritis versus BPPV, discussed a course of Medrol Dosepak, over-the-counter decongestions, meclizine, and outpatient PCP follow-up as needed.  ED return for worsening symptoms as needed. [DC]      ED Course User Index  [DC] Hamilton Da Silva MD       Hospitalization Considered?:     Orders Placed During This Visit:  Orders Placed This Encounter   Procedures    [place] Draw    Comprehensive Metabolic Panel    hCG, Serum, Qualitative    CBC Auto Differential    Urinalysis With Microscopic If Indicated (No Culture) - Urine, Clean Catch    Pregnancy, Urine - Urine, Clean Catch    Urinalysis, Microscopic Only - Urine, Clean Catch    [place] Draw    Undress & Gown    Continuous Pulse Oximetry    Vital Signs    POC Glucose Once    POC Urine Pregnancy    POC Glucose Once    ECG 12 Lead ED Triage Standing Order; Weak / Dizzy / AMS    CBC & Differential    Green Top (Gel)    Lavender Top    Light Blue Top    Green Top (Gel)    Lavender Top    Gold Top - SST    Light Blue Top       Additional orders considered but not placed:      Independent interpretation of labs, radiology studies, and discussions with consultants: See ED Course        AS OF 18:30 EST VITALS:    BP - 112/66  HR - 81  TEMP - 97 °F (36.1 °C) (Tympanic)  02 SATS - 99%          DIAGNOSIS  Final diagnoses:   Dizziness   History of COVID-19   Nausea and vomiting, unspecified vomiting type         DISPOSITION  ED Disposition       ED Disposition   Discharge    Condition   Stable    Comment   --                Please note that portions of this document were completed with a voice recognition program.    Note  Disclaimer: At [facility], we believe that sharing information builds trust and better relationships. You are receiving this note because you recently visited [facility]. It is possible you will see health information before a provider has talked with you about it. This kind of information can be easy to misunderstand. To help you fully understand what it means for your health, we urge you to discuss this note with your provider.                       Hamilotn Da Silva MD  02/05/24 1128

## 2024-02-05 NOTE — Clinical Note
[facility] EMERGENCY DEPARTMENT  [address] [facility] [address]  Phone: [phone]    Tran Quintanilla was seen and treated in our emergency department on 2/5/2024.  She may return to work on 02/08/2024.         Thank you for choosing [facility].    Hamilton Da Silva MD

## 2024-03-27 DIAGNOSIS — G43.109 MIGRAINE WITH AURA AND WITHOUT STATUS MIGRAINOSUS, NOT INTRACTABLE: ICD-10-CM

## 2024-03-27 RX ORDER — ERENUMAB-AOOE 140 MG/ML
140 INJECTION, SOLUTION SUBCUTANEOUS
Qty: 3 ML | Refills: 1 | Status: SHIPPED | OUTPATIENT
Start: 2024-03-27

## 2024-03-27 RX ORDER — BACLOFEN 10 MG/1
10 TABLET ORAL 3 TIMES DAILY
Qty: 60 TABLET | Refills: 0 | Status: SHIPPED | OUTPATIENT
Start: 2024-03-27

## 2024-04-16 DIAGNOSIS — G43.109 MIGRAINE WITH AURA AND WITHOUT STATUS MIGRAINOSUS, NOT INTRACTABLE: ICD-10-CM

## 2024-04-16 RX ORDER — ERENUMAB-AOOE 140 MG/ML
140 INJECTION, SOLUTION SUBCUTANEOUS
Qty: 3 ML | Refills: 1 | Status: SHIPPED | OUTPATIENT
Start: 2024-04-16

## 2024-04-25 DIAGNOSIS — G43.109 MIGRAINE WITH AURA AND WITHOUT STATUS MIGRAINOSUS, NOT INTRACTABLE: ICD-10-CM

## 2024-04-26 RX ORDER — ERENUMAB-AOOE 140 MG/ML
140 INJECTION, SOLUTION SUBCUTANEOUS
Qty: 3 ML | Refills: 1 | Status: SHIPPED | OUTPATIENT
Start: 2024-04-26

## 2024-06-09 DIAGNOSIS — E53.8 B12 DEFICIENCY: ICD-10-CM

## 2024-06-10 RX ORDER — CYANOCOBALAMIN 1000 UG/ML
1000 INJECTION, SOLUTION INTRAMUSCULAR; SUBCUTANEOUS
Qty: 1 ML | Refills: 3 | Status: SHIPPED | OUTPATIENT
Start: 2024-06-10

## 2024-07-22 ENCOUNTER — OFFICE VISIT (OUTPATIENT)
Dept: INTERNAL MEDICINE | Facility: CLINIC | Age: 24
End: 2024-07-22
Payer: COMMERCIAL

## 2024-07-22 DIAGNOSIS — K59.09 OTHER CONSTIPATION: ICD-10-CM

## 2024-07-22 DIAGNOSIS — E53.8 B12 DEFICIENCY: ICD-10-CM

## 2024-07-22 DIAGNOSIS — R40.0 DAYTIME SOMNOLENCE: Primary | ICD-10-CM

## 2024-07-22 DIAGNOSIS — Z00.00 ANNUAL PHYSICAL EXAM: ICD-10-CM

## 2024-07-22 PROCEDURE — 99214 OFFICE O/P EST MOD 30 MIN: CPT | Performed by: PHYSICIAN ASSISTANT

## 2024-07-22 RX ORDER — POLYETHYLENE GLYCOL 3350 17 G/17G
17 POWDER, FOR SOLUTION ORAL DAILY
COMMUNITY

## 2024-07-22 RX ORDER — CALCIUM POLYCARBOPHIL 625 MG
625 TABLET ORAL DAILY
COMMUNITY

## 2024-07-22 RX ORDER — DOCUSATE SODIUM 100 MG/1
100 CAPSULE, LIQUID FILLED ORAL 2 TIMES DAILY
COMMUNITY

## 2024-07-22 NOTE — PROGRESS NOTES
Subjective   Chief Complaint   Patient presents with    Fatigue    Migraine     F/U       History of Present Illness     Pt is here today for fup. She did not give herself her [employer]ov[employer] this month as her constipation has been bad. She is still having a lot of fatigue, sleep medicine has yet to call to see her.      Patient Active Problem List   Diagnosis    Migraine headache    IUD (intrauterine device) in place    Rectal bleeding    H/O Clostridium difficile infection    Abdominal pain    Diarrhea    Heartburn       No Known Allergies    Current Outpatient Medications on File Prior to Visit   Medication Sig Dispense Refill    acetaminophen (TYLENOL) 500 MG tablet Take 2 tablets by mouth Every 6 (Six) Hours As Needed for Mild Pain.      baclofen (LIORESAL) 10 MG tablet Take 1 (one) Tablet by mouth up to three times daily, as needed 60 tablet 0    betamethasone, augmented, (DIPROLENE) 0.05 % cream Apply twice a day to arms, legs (not face) as needed for rash, PMLE 50 g 0    busPIRone (BUSPAR) 5 MG tablet Take 1 tablet by mouth 3 (Three) Times a Day. (Patient taking differently: Take 1 tablet by mouth Daily As Needed.) 90 tablet 1    clindamycin (CLEOCIN T) 1 % lotion Apply  topically to the appropriate area as directed Every Morning. 60 mL 1    cyanocobalamin 1000 MCG/ML injection Inject 1 mL into the appropriate muscle as directed by prescriber Every 14 (Fourteen) Days. 1 mL 3    cyclobenzaprine (FLEXERIL) 5 MG tablet Take 1 tablet by mouth 3 (Three) Times a Day As Needed for Muscle Spasms. 30 tablet 0    docusate sodium (COLACE) 100 MG capsule Take 1 capsule by mouth 2 (Two) Times a Day.      Erenumab-aooe (Aimovig) 140 MG/ML auto-injector Inject 1 mL under the skin into the appropriate area as directed Every 30 (Thirty) Days. 3 mL 1    polycarbophil (calcium polycarbophil) 625 MG tablet tablet Take 1 tablet by mouth Daily.      polyethylene glycol (MIRALAX) 17 GM/SCOOP powder Take 17 g by mouth Daily.       "promethazine (PHENERGAN) 25 MG tablet Take 1 tablet by mouth Every 6 (Six) Hours As Needed for Nausea or Vomiting. 30 tablet 0    spironolactone (ALDACTONE) 100 MG tablet Take 1 tablet by mouth Daily. 30 tablet 10    SUMAtriptan (Imitrex) 50 MG tablet Take one tablet at onset of headache. May repeat dose one time in 2 hours if headache not relieved. 9 tablet 0    Syringe/Needle, Disp, 25G X 5/8\" 3 ML misc Use 1 each Every 28 (Twenty-Eight) Days. 100 each 0    tretinoin (RETIN-A) 0.025 % cream Apply 1 Application topically to the appropriate area as directed Every Night.       No current facility-administered medications on file prior to visit.       Past Medical History:   Diagnosis Date    Anesthesia     BLOOD PRESSURE DROPPED POST-OP WITH EAR TUBES AGE 4    Anxiety     Deviated septum     History of Clostridium difficile colitis     NUMEROUS ANTIBIOTICS WITH STREP HX    History of strep sore throat     7 TIMES IN 2020    Migraines     Rectal bleeding        Family History   Problem Relation Age of Onset    Diabetes Mother     Cancer Father         Melanoma    Hyperlipidemia Father     Hypertension Father     Epilepsy Father     Diabetes Maternal Grandmother         CHF & Diabetes    Cancer Paternal Grandfather         Melanoma    Migraines Paternal Aunt     Breast cancer Paternal Aunt     Breast cancer Paternal Aunt     Prostate cancer Paternal Uncle     Leukemia Paternal Uncle     Malig Hyperthermia Neg Hx     Colon cancer Neg Hx     Colon polyps Neg Hx     Crohn's disease Neg Hx     Irritable bowel syndrome Neg Hx     Ulcerative colitis Neg Hx        Social History     Socioeconomic History    Marital status:    Tobacco Use    Smoking status: Never    Smokeless tobacco: Never    Tobacco comments:     vaping   Vaping Use    Vaping status: Former   Substance and Sexual Activity    Alcohol use: Yes     Comment: Social    Drug use: Never    Sexual activity: Yes     Partners: Male     Birth control/protection: " Condom, Same-sex partner       Past Surgical History:   Procedure Laterality Date    COLONOSCOPY N/A 09/01/2022    Procedure: COLONOSCOPY TO CECUM AND TERMINAL ILEUM WITH BIOPSIES;  Surgeon: Kaleb Joyner MD;  Location: [facility] ENDOSCOPY;  Service: Gastroenterology;  Laterality: N/A;  PRE- BLOOD IN STOOL  POST- HEMORRHOIDS, ANAL FISSURE    EAR TUBES      ENDOSCOPY N/A 09/01/2022    Procedure: ESOPHAGOGASTRODUODENOSCOPY WITH BIOPSIES;  Surgeon: Kaleb Joyner MD;  Location: [facility] ENDOSCOPY;  Service: Gastroenterology;  Laterality: N/A;  PRE- ABDOMINAL PAIN  POST- MILD GASTRITIS    SIGMOIDOSCOPY N/A 03/11/2022    Procedure: FLEXIBLE SIGMOIDOSCOPY WITH BIOPSIES;  Surgeon: Baron Ayoub MD;  Location: [facility] ENDOSCOPY;  Service: Gastroenterology;  Laterality: N/A;  Pre: rectal bleeding  Post: hemorrhoids    TONSILLECTOMY Bilateral 07/13/2021    Procedure: TONSILLECTOMY;  Surgeon: Duc Alvarez MD;  Location: [facility] OR;  Service: ENT;  Laterality: Bilateral;    TONSILLECTOMY  07/13/2021    WISDOM TOOTH EXTRACTION           The following portions of the patient's history were reviewed and updated as appropriate: problem list, allergies, current medications, past medical history, past family history, past social history, and past surgical history.    ROS    See HPI    Immunization History   Administered Date(s) Administered    COVID-19 (PFIZER) Purple Cap Monovalent 12/22/2020, 01/12/2021    DTaP 1998, 1998, 01/21/1999, 01/13/2000, 07/02/2002    Flu Vaccine Intradermal Quad 18-64YR 10/23/2007, 10/30/2015    Flu Vaccine Split Quad 02/28/2017, 10/06/2017, 09/20/2018, 09/24/2019    Fluzone (or Fluarix & Flulaval for VFC) >6mos 02/28/2017, 10/06/2017    Fluzone Quad >6mos (Multi-dose) 10/24/2001    HPV Quadrivalent 07/13/2009, 09/14/2009, 01/15/2010    Hep A / Hep B 01/02/2018, 02/07/2018, 07/12/2018    Hep A, 2 Dose 10/24/2008, 07/13/2009, 07/31/2009    Hep B, Adolescent or Pediatric  "1998, 1998, 01/21/1999    Hib (HbOC) 1998, 1998, 01/21/1999, 01/13/2000    Hib (PRP-OMP) 1998, 1998, 01/21/1999, 01/13/2000    Hpv9 11/09/2017    IPV 1998, 1998, 01/13/2000, 07/12/2002    Influenza LAIV (Nasal) 10/24/2008    Influenza Quad Vaccine (Inpatient) 10/24/2001    MMR 10/11/1999, 07/12/2002    Meningococcal MCV4P (Menactra) 09/14/2009, 11/09/2017    OPV 1998, 1998, 01/13/2000    PPD Test 02/28/2017, 11/09/2017    Pneumococcal Conjugate 13-Valent (PCV13) 11/22/2000    Tdap 07/13/2009, 12/01/2017    Varicella 07/15/1999, 01/13/2000, 10/24/2008, 02/07/2018       Objective   Vitals:    07/22/24 1510   BP: 114/70   Temp: 98.2 °F (36.8 °C)   Weight: 65.3 kg (144 lb)   Height: 165.1 cm (65\")     Body mass index is 23.96 kg/m².  Physical Exam  Vitals reviewed.   Constitutional:       Appearance: She is well-developed.   HENT:      Head: Normocephalic and atraumatic.   Cardiovascular:      Rate and Rhythm: Normal rate and regular rhythm.      Heart sounds: Normal heart sounds, S1 normal and S2 normal.   Pulmonary:      Effort: Pulmonary effort is normal.      Breath sounds: Normal breath sounds.   Skin:     General: Skin is warm.   Neurological:      Mental Status: She is alert.   Psychiatric:         Behavior: Behavior normal.       Assessment & Plan   Diagnoses and all orders for this visit:    1. Daytime somnolence (Primary)  -     Ambulatory Referral to Sleep Medicine    2. Other constipation  -     galcanezumab-gnlm (EMGALITY) 120 MG/ML auto-injector pen; Inject 2 mL under the skin into the appropriate area as directed 1 (One) Time for 1 dose.  Dispense: 1 mL; Refill: 0  -     Galcanezumab-gnlm 120 MG/ML solution prefilled syringe; Inject 1 mL under the skin into the appropriate area as directed Every 30 (Thirty) Days.  Dispense: 1 mL; Refill: 5  -     Ambulatory Referral to Gastroenterology    3. B12 deficiency  -     CBC & Differential  -     Vitamin " B12    4. Annual physical exam  -     Comprehensive Metabolic Panel  -     Lipid Panel With / Chol / HDL Ratio    Will try switching her from Aimovig to Emgality see if this helps with her constipation. Resubmitted sleep medicine referral today. Needs labs today. Would like her to FUP with GI for ongoing constipation sx as well.      Return in about 6 months (around 1/22/2025) for Lab Today, Annual physical.

## 2024-07-23 VITALS
BODY MASS INDEX: 23.99 KG/M2 | WEIGHT: 144 LBS | HEIGHT: 65 IN | SYSTOLIC BLOOD PRESSURE: 114 MMHG | TEMPERATURE: 98.2 F | DIASTOLIC BLOOD PRESSURE: 70 MMHG

## 2024-07-23 LAB
ALBUMIN SERPL-MCNC: 4.9 G/DL (ref 3.5–5.2)
ALBUMIN/GLOB SERPL: 2.3 G/DL
ALP SERPL-CCNC: 69 U/L (ref 39–117)
ALT SERPL-CCNC: 13 U/L (ref 1–33)
AST SERPL-CCNC: 18 U/L (ref 1–32)
BASOPHILS # BLD AUTO: 0.04 10*3/MM3 (ref 0–0.2)
BASOPHILS NFR BLD AUTO: 0.6 % (ref 0–1.5)
BILIRUB SERPL-MCNC: 0.4 MG/DL (ref 0–1.2)
BUN SERPL-MCNC: 11 MG/DL (ref 6–20)
BUN/CREAT SERPL: 14.9 (ref 7–25)
CALCIUM SERPL-MCNC: 9.9 MG/DL (ref 8.6–10.5)
CHLORIDE SERPL-SCNC: 102 MMOL/L (ref 98–107)
CHOLEST SERPL-MCNC: 185 MG/DL (ref 0–200)
CHOLEST/HDLC SERPL: 3.43 {RATIO}
CO2 SERPL-SCNC: 26.5 MMOL/L (ref 22–29)
CREAT SERPL-MCNC: 0.74 MG/DL (ref 0.57–1)
EGFRCR SERPLBLD CKD-EPI 2021: 114.6 ML/MIN/1.73
EOSINOPHIL # BLD AUTO: 0.13 10*3/MM3 (ref 0–0.4)
EOSINOPHIL NFR BLD AUTO: 2 % (ref 0.3–6.2)
ERYTHROCYTE [DISTWIDTH] IN BLOOD BY AUTOMATED COUNT: 11.7 % (ref 12.3–15.4)
GLOBULIN SER CALC-MCNC: 2.1 GM/DL
GLUCOSE SERPL-MCNC: 97 MG/DL (ref 65–99)
HCT VFR BLD AUTO: 41.1 % (ref 34–46.6)
HDLC SERPL-MCNC: 54 MG/DL (ref 40–60)
HGB BLD-MCNC: 13.7 G/DL (ref 12–15.9)
IMM GRANULOCYTES # BLD AUTO: 0.02 10*3/MM3 (ref 0–0.05)
IMM GRANULOCYTES NFR BLD AUTO: 0.3 % (ref 0–0.5)
LDLC SERPL CALC-MCNC: 108 MG/DL (ref 0–100)
LYMPHOCYTES # BLD AUTO: 2.05 10*3/MM3 (ref 0.7–3.1)
LYMPHOCYTES NFR BLD AUTO: 31 % (ref 19.6–45.3)
MCH RBC QN AUTO: 30.6 PG (ref 26.6–33)
MCHC RBC AUTO-ENTMCNC: 33.3 G/DL (ref 31.5–35.7)
MCV RBC AUTO: 91.9 FL (ref 79–97)
MONOCYTES # BLD AUTO: 0.48 10*3/MM3 (ref 0.1–0.9)
MONOCYTES NFR BLD AUTO: 7.3 % (ref 5–12)
NEUTROPHILS # BLD AUTO: 3.9 10*3/MM3 (ref 1.7–7)
NEUTROPHILS NFR BLD AUTO: 58.8 % (ref 42.7–76)
NRBC BLD AUTO-RTO: 0 /100 WBC (ref 0–0.2)
PLATELET # BLD AUTO: 274 10*3/MM3 (ref 140–450)
POTASSIUM SERPL-SCNC: 4.1 MMOL/L (ref 3.5–5.2)
PROT SERPL-MCNC: 7 G/DL (ref 6–8.5)
RBC # BLD AUTO: 4.47 10*6/MM3 (ref 3.77–5.28)
SODIUM SERPL-SCNC: 138 MMOL/L (ref 136–145)
TRIGL SERPL-MCNC: 127 MG/DL (ref 0–150)
VIT B12 SERPL-MCNC: 641 PG/ML (ref 211–946)
VLDLC SERPL CALC-MCNC: 23 MG/DL (ref 5–40)
WBC # BLD AUTO: 6.62 10*3/MM3 (ref 3.4–10.8)

## 2024-08-12 ENCOUNTER — OFFICE VISIT (OUTPATIENT)
Dept: INTERNAL MEDICINE | Facility: CLINIC | Age: 24
End: 2024-08-12
Payer: COMMERCIAL

## 2024-08-12 VITALS — WEIGHT: 144 LBS | HEIGHT: 65 IN | TEMPERATURE: 98.1 F | BODY MASS INDEX: 23.99 KG/M2

## 2024-08-12 DIAGNOSIS — L23.7 POISON IVY: Primary | ICD-10-CM

## 2024-08-12 PROCEDURE — 96372 THER/PROPH/DIAG INJ SC/IM: CPT | Performed by: PHYSICIAN ASSISTANT

## 2024-08-12 PROCEDURE — 99213 OFFICE O/P EST LOW 20 MIN: CPT | Performed by: PHYSICIAN ASSISTANT

## 2024-08-12 RX ORDER — METHYLPREDNISOLONE ACETATE 40 MG/ML
80 INJECTION, SUSPENSION INTRA-ARTICULAR; INTRALESIONAL; INTRAMUSCULAR; SOFT TISSUE ONCE
Status: COMPLETED | OUTPATIENT
Start: 2024-08-12 | End: 2024-08-12

## 2024-08-12 RX ORDER — PREDNISONE 10 MG/1
TABLET ORAL
Qty: 21 TABLET | Refills: 0 | Status: SHIPPED | OUTPATIENT
Start: 2024-08-12 | End: 2024-08-22

## 2024-08-12 RX ADMIN — METHYLPREDNISOLONE ACETATE 80 MG: 40 INJECTION, SUSPENSION INTRA-ARTICULAR; INTRALESIONAL; INTRAMUSCULAR; SOFT TISSUE at 16:04

## 2024-08-12 NOTE — PROGRESS NOTES
Subjective   Chief Complaint   Patient presents with    Poison Mercedes     torso       History of Present Illness     Pt is here today with a rash on her abdomen, upper extremities, back and hip for over a week. Continuing to spread and itch. Has used topicals with no improvement.      Patient Active Problem List   Diagnosis    Migraine headache    IUD (intrauterine device) in place    Rectal bleeding    H/O Clostridium difficile infection    Abdominal pain    Diarrhea    Heartburn       No Known Allergies    Current Outpatient Medications on File Prior to Visit   Medication Sig Dispense Refill    acetaminophen (TYLENOL) 500 MG tablet Take 2 tablets by mouth Every 6 (Six) Hours As Needed for Mild Pain.      baclofen (LIORESAL) 10 MG tablet Take 1 (one) Tablet by mouth up to three times daily, as needed 60 tablet 0    betamethasone, augmented, (DIPROLENE) 0.05 % cream Apply twice a day to arms, legs (not face) as needed for rash, PMLE 50 g 0    busPIRone (BUSPAR) 5 MG tablet Take 1 tablet by mouth 3 (Three) Times a Day. (Patient taking differently: Take 1 tablet by mouth Daily As Needed.) 90 tablet 1    clindamycin (CLEOCIN T) 1 % lotion Apply  topically to the appropriate area as directed Every Morning. 60 mL 1    cyanocobalamin 1000 MCG/ML injection Inject 1 mL into the appropriate muscle as directed by prescriber Every 14 (Fourteen) Days. 1 mL 3    cyclobenzaprine (FLEXERIL) 5 MG tablet Take 1 tablet by mouth 3 (Three) Times a Day As Needed for Muscle Spasms. 30 tablet 0    docusate sodium (COLACE) 100 MG capsule Take 1 capsule by mouth 2 (Two) Times a Day.      Erenumab-aooe (Aimovig) 140 MG/ML auto-injector Inject 1 mL under the skin into the appropriate area as directed Every 30 (Thirty) Days. 3 mL 1    Galcanezumab-gnlm 120 MG/ML solution prefilled syringe Inject 1 mL under the skin into the appropriate area as directed Every 30 (Thirty) Days. 1 mL 5    polycarbophil (calcium polycarbophil) 625 MG tablet tablet  "Take 1 tablet by mouth Daily.      polyethylene glycol (MIRALAX) 17 GM/SCOOP powder Take 17 g by mouth Daily.      promethazine (PHENERGAN) 25 MG tablet Take 1 tablet by mouth Every 6 (Six) Hours As Needed for Nausea or Vomiting. 30 tablet 0    spironolactone (ALDACTONE) 100 MG tablet Take 1 tablet by mouth Daily. 30 tablet 10    SUMAtriptan (Imitrex) 50 MG tablet Take one tablet at onset of headache. May repeat dose one time in 2 hours if headache not relieved. 9 tablet 0    Syringe/Needle, Disp, 25G X 5/8\" 3 ML misc Use 1 each Every 28 (Twenty-Eight) Days. 100 each 0    tretinoin (RETIN-A) 0.025 % cream Apply 1 Application topically to the appropriate area as directed Every Night.       No current facility-administered medications on file prior to visit.       Past Medical History:   Diagnosis Date    Anesthesia     BLOOD PRESSURE DROPPED POST-OP WITH EAR TUBES AGE 4    Anxiety     Deviated septum     History of Clostridium difficile colitis     NUMEROUS ANTIBIOTICS WITH STREP HX    History of strep sore throat     7 TIMES IN 2020    Migraines     Rectal bleeding        Family History   Problem Relation Age of Onset    Diabetes Mother     Cancer Father         Melanoma    Hyperlipidemia Father     Hypertension Father     Epilepsy Father     Diabetes Maternal Grandmother         CHF & Diabetes    Cancer Paternal Grandfather         Melanoma    Migraines Paternal Aunt     Breast cancer Paternal Aunt     Breast cancer Paternal Aunt     Prostate cancer Paternal Uncle     Leukemia Paternal Uncle     Malig Hyperthermia Neg Hx     Colon cancer Neg Hx     Colon polyps Neg Hx     Crohn's disease Neg Hx     Irritable bowel syndrome Neg Hx     Ulcerative colitis Neg Hx        Social History     Socioeconomic History    Marital status:    Tobacco Use    Smoking status: Never    Smokeless tobacco: Never    Tobacco comments:     vaping   Vaping Use    Vaping status: Former   Substance and Sexual Activity    Alcohol use: " Yes     Comment: Social    Drug use: Never    Sexual activity: Yes     Partners: Male     Birth control/protection: Condom, Partner of same sex       Past Surgical History:   Procedure Laterality Date    COLONOSCOPY N/A 09/01/2022    Procedure: COLONOSCOPY TO CECUM AND TERMINAL ILEUM WITH BIOPSIES;  Surgeon: Kaleb Joyner MD;  Location: [facility] ENDOSCOPY;  Service: Gastroenterology;  Laterality: N/A;  PRE- BLOOD IN STOOL  POST- HEMORRHOIDS, ANAL FISSURE    EAR TUBES      ENDOSCOPY N/A 09/01/2022    Procedure: ESOPHAGOGASTRODUODENOSCOPY WITH BIOPSIES;  Surgeon: Kaleb Joyner MD;  Location: [facility] ENDOSCOPY;  Service: Gastroenterology;  Laterality: N/A;  PRE- ABDOMINAL PAIN  POST- MILD GASTRITIS    SIGMOIDOSCOPY N/A 03/11/2022    Procedure: FLEXIBLE SIGMOIDOSCOPY WITH BIOPSIES;  Surgeon: Baron Ayoub MD;  Location: [facility] ENDOSCOPY;  Service: Gastroenterology;  Laterality: N/A;  Pre: rectal bleeding  Post: hemorrhoids    TONSILLECTOMY Bilateral 07/13/2021    Procedure: TONSILLECTOMY;  Surgeon: Duc Alvarez MD;  Location: [facility] OR;  Service: ENT;  Laterality: Bilateral;    TONSILLECTOMY  07/13/2021    WISDOM TOOTH EXTRACTION           The following portions of the patient's history were reviewed and updated as appropriate: problem list, allergies, current medications, past medical history, past family history, past social history, and past surgical history.    ROS    See HPI    Immunization History   Administered Date(s) Administered    COVID-19 (PFIZER) Purple Cap Monovalent 12/22/2020, 01/12/2021    DTaP 1998, 1998, 01/21/1999, 01/13/2000, 07/02/2002    Flu Vaccine Intradermal Quad 18-64YR 10/23/2007, 10/30/2015    Flu Vaccine Split Quad 02/28/2017, 10/06/2017, 09/20/2018, 09/24/2019    FluMist 2-49yrs (Nasal) 10/24/2008    Fluzone  >6mos 10/24/2001    Fluzone (or Fluarix & Flulaval for VFC) >6mos 02/28/2017, 10/06/2017    Fluzone Quad >6mos (Multi-dose) 10/24/2001    HPV  "Quadrivalent 07/13/2009, 09/14/2009, 01/15/2010    Hep A / Hep B 01/02/2018, 02/07/2018, 07/12/2018    Hep A, 2 Dose 10/24/2008, 07/13/2009, 07/31/2009    Hep B, Adolescent or Pediatric 1998, 1998, 01/21/1999    Hib (HbOC) 1998, 1998, 01/21/1999, 01/13/2000    Hib (PRP-OMP) 1998, 1998, 01/21/1999, 01/13/2000    Hpv9 11/09/2017    IPV 1998, 1998, 01/13/2000, 07/12/2002    MMR 10/11/1999, 07/12/2002    Meningococcal MCV4P (Menactra) 09/14/2009, 11/09/2017    OPV 1998, 1998, 01/13/2000    PPD Test 02/28/2017, 11/09/2017    Pneumococcal Conjugate 13-Valent (PCV13) 11/22/2000    Tdap 07/13/2009, 12/01/2017    Varicella 07/15/1999, 01/13/2000, 10/24/2008, 02/07/2018       Objective   Vitals:    08/12/24 1550   Temp: 98.1 °F (36.7 °C)   Weight: 65.3 kg (144 lb)   Height: 165.1 cm (65\")     Body mass index is 23.96 kg/m².  Physical Exam  Vitals reviewed.   Constitutional:       Appearance: Normal appearance.   HENT:      Head: Normocephalic and atraumatic.   Cardiovascular:      Rate and Rhythm: Normal rate and regular rhythm.      Heart sounds: Normal heart sounds.   Skin:     Comments: Extensive rash across torso, upper extremities and left hip and back.    Neurological:      Mental Status: She is alert.           Assessment & Plan   Diagnoses and all orders for this visit:    1. Poison ivy (Primary)  -     methylPREDNISolone acetate (DEPO-medrol) injection 80 mg    Other orders  -     predniSONE (DELTASONE) 10 MG tablet; Take 3 tablets by mouth Daily for 4 days, THEN 2 tablets Daily for 3 days, THEN 1 tablet Daily for 3 days.  Dispense: 21 tablet; Refill: 0     Txt as above. Finish steroids to completion.                "

## 2024-08-28 ENCOUNTER — OFFICE VISIT (OUTPATIENT)
Dept: MAMMOGRAPHY | Facility: CLINIC | Age: 24
End: 2024-08-28
Payer: COMMERCIAL

## 2024-08-28 VITALS
HEART RATE: 108 BPM | SYSTOLIC BLOOD PRESSURE: 124 MMHG | OXYGEN SATURATION: 97 % | DIASTOLIC BLOOD PRESSURE: 91 MMHG | BODY MASS INDEX: 24.16 KG/M2 | WEIGHT: 145 LBS | HEIGHT: 65 IN

## 2024-08-28 DIAGNOSIS — Z91.89 AT HIGH RISK FOR BREAST CANCER: Primary | ICD-10-CM

## 2024-08-28 NOTE — PROGRESS NOTES
Subjective   Tran Quintanilla is a 24 y.o. 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  There have been no new cases of breast cancer since I last saw her.    We have not been performing imaging because of her age.  The patient has not palpated anything of concern.    She remains nulliparous.    Previous risk assessment has estimated her lifetime risk at 27%.  We have not estimated her 5-year risk because of her age.      Review of Systems constitutional-no unusual changes in weight or appetite.  Past Medical History:   Diagnosis Date    Anesthesia     BLOOD PRESSURE DROPPED POST-OP WITH EAR TUBES AGE 4    Anxiety     Deviated septum     History of Clostridium difficile colitis     NUMEROUS ANTIBIOTICS WITH STREP HX    History of strep sore throat     7 TIMES IN 2020    Migraines     Rectal bleeding      Past Surgical History:   Procedure Laterality Date    COLONOSCOPY N/A 09/01/2022    Procedure: COLONOSCOPY TO CECUM AND TERMINAL ILEUM WITH BIOPSIES;  Surgeon: Kaleb Joyner MD;  Location: [facility] ENDOSCOPY;  Service: Gastroenterology;  Laterality: N/A;  PRE- BLOOD IN STOOL  POST- HEMORRHOIDS, ANAL FISSURE    EAR TUBES      ENDOSCOPY N/A 09/01/2022    Procedure: ESOPHAGOGASTRODUODENOSCOPY WITH BIOPSIES;  Surgeon: Kaleb Joyner MD;  Location: [facility] ENDOSCOPY;  Service: Gastroenterology;  Laterality: N/A;  PRE- ABDOMINAL PAIN  POST- MILD GASTRITIS    SIGMOIDOSCOPY N/A 03/11/2022    Procedure: FLEXIBLE SIGMOIDOSCOPY WITH BIOPSIES;  Surgeon: Baron Ayoub MD;  Location: [facility] ENDOSCOPY;  Service: Gastroenterology;  Laterality: N/A;  Pre: rectal bleeding  Post: hemorrhoids    TONSILLECTOMY Bilateral 07/13/2021    Procedure: TONSILLECTOMY;   Surgeon: Duc Alvarez MD;  Location: [facility] OR;  Service: ENT;  Laterality: Bilateral;    TONSILLECTOMY  07/13/2021    WISDOM TOOTH EXTRACTION       Family History   Problem Relation Age of Onset    Diabetes Mother     Cancer Father         Melanoma    Hyperlipidemia Father     Hypertension Father     Epilepsy Father     Diabetes Maternal Grandmother         CHF & Diabetes    Cancer Paternal Grandfather         Melanoma    Migraines Paternal Aunt     Breast cancer Paternal Aunt     Breast cancer Paternal Aunt     Prostate cancer Paternal Uncle     Leukemia Paternal Uncle     Malig Hyperthermia Neg Hx     Colon cancer Neg Hx     Colon polyps Neg Hx     Crohn's disease Neg Hx     Irritable bowel syndrome Neg Hx     Ulcerative colitis Neg Hx      Social History     Socioeconomic History    Marital status:    Tobacco Use    Smoking status: Never    Smokeless tobacco: Never    Tobacco comments:     vaping   Vaping Use    Vaping status: Former   Substance and Sexual Activity    Alcohol use: Yes     Comment: Social    Drug use: Never    Sexual activity: Yes     Partners: Male     Birth control/protection: Condom, Partner of same sex       Objective   Physical Exam  BMI is within normal parameters. No other follow-up for BMI required.  BMI is 24.1.  Right breast-medium size and symmetrical.  The skin of the breast looks normal and there was no nipple discharge.  With the arms raised and the pectoralis muscle contracted, I did not see any skin dimpling or nipple retraction.  The breast tissue is dense across the upper portion of the breast but there were no dominant masses.  Left breast-medium size and symmetrical.  There was no nipple discharge and I did not see any changes to the skin of the breast.  When the arms were maneuvered, there was no skin dimpling or nipple retraction.  She has a similar pattern of density across the upper part of the breast but there were no dominant masses.  Lymphatics-no  axillary adenopathy was noted.    Assessment & Plan   At high risk for breast cancer-the patient's physical examination is unremarkable today.  Our plans are to alternate clinical exam with Dr. Esquivel.  We we will begin imaging with MRIs at the age of 28 and then at 33 we will do both mammogram and MRI.  She knows to call me if she palpates anything of concern.    The encounter diagnosis was At high risk for breast cancer.

## 2024-08-28 NOTE — LETTER
August 28, 2024     Sammi Esquivel MD  [address]  [facility] [address]  [facility] [address]    Patient: Tran Quintanilla   YOB: 2000   Date of Visit: 8/28/2024     Dear Sammi Esquivel MD:       Thank you for referring Tran Quintanilla to me for evaluation. Below are the relevant portions of my assessment and plan of care.    If you have questions, please do not hesitate to call me. I look forward to following Tran along with you.         Sincerely,        Christopher Geiger MD        CC: MYNOR Ryan William P, MD  08/28/24 5780  Sign when Signing Visit  Subjective  Tran Quintanilla is a 24 y.o. 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  There have been no new cases of breast cancer since I last saw her.    We have not been performing imaging because of her age.  The patient has not palpated anything of concern.    She remains nulliparous.    Previous risk assessment has estimated her lifetime risk at 27%.  We have not estimated her 5-year risk because of her age.      Review of Systems constitutional-no unusual changes in weight or appetite.  Past Medical History:   Diagnosis Date   • Anesthesia     BLOOD PRESSURE DROPPED POST-OP WITH EAR TUBES AGE 4   • Anxiety    • Deviated septum    • History of Clostridium difficile colitis     NUMEROUS ANTIBIOTICS WITH STREP HX   • History of strep sore throat     7 TIMES IN 2020   • Migraines    • Rectal bleeding      Past Surgical History:   Procedure Laterality Date   • COLONOSCOPY N/A 09/01/2022    Procedure: COLONOSCOPY TO CECUM AND TERMINAL ILEUM WITH BIOPSIES;  Surgeon: Kaleb Joyner MD;  Location: [facility] ENDOSCOPY;  Service: Gastroenterology;  Laterality: N/A;  PRE- BLOOD IN  STOOL  POST- HEMORRHOIDS, ANAL FISSURE   • EAR TUBES     • ENDOSCOPY N/A 09/01/2022    Procedure: ESOPHAGOGASTRODUODENOSCOPY WITH BIOPSIES;  Surgeon: Kaleb Joyner MD;  Location: [facility] ENDOSCOPY;  Service: Gastroenterology;  Laterality: N/A;  PRE- ABDOMINAL PAIN  POST- MILD GASTRITIS   • SIGMOIDOSCOPY N/A 03/11/2022    Procedure: FLEXIBLE SIGMOIDOSCOPY WITH BIOPSIES;  Surgeon: Baron Ayoub MD;  Location: [facility] ENDOSCOPY;  Service: Gastroenterology;  Laterality: N/A;  Pre: rectal bleeding  Post: hemorrhoids   • TONSILLECTOMY Bilateral 07/13/2021    Procedure: TONSILLECTOMY;  Surgeon: Duc Alvarez MD;  Location: [facility] MAIN OR;  Service: ENT;  Laterality: Bilateral;   • TONSILLECTOMY  07/13/2021   • WISDOM TOOTH EXTRACTION       Family History   Problem Relation Age of Onset   • Diabetes Mother    • Cancer Father         Melanoma   • Hyperlipidemia Father    • Hypertension Father    • Epilepsy Father    • Diabetes Maternal Grandmother         CHF & Diabetes   • Cancer Paternal Grandfather         Melanoma   • Migraines Paternal Aunt    • Breast cancer Paternal Aunt    • Breast cancer Paternal Aunt    • Prostate cancer Paternal Uncle    • Leukemia Paternal Uncle    • Malig Hyperthermia Neg Hx    • Colon cancer Neg Hx    • Colon polyps Neg Hx    • Crohn's disease Neg Hx    • Irritable bowel syndrome Neg Hx    • Ulcerative colitis Neg Hx      Social History     Socioeconomic History   • Marital status:    Tobacco Use   • Smoking status: Never   • Smokeless tobacco: Never   • Tobacco comments:     vaping   Vaping Use   • Vaping status: Former   Substance and Sexual Activity   • Alcohol use: Yes     Comment: Social   • Drug use: Never   • Sexual activity: Yes     Partners: Male     Birth control/protection: Condom, Partner of same sex       Objective  Physical Exam  BMI is within normal parameters. No other follow-up for BMI required.  BMI is 24.1.  Right breast-medium size and symmetrical.  The  skin of the breast looks normal and there was no nipple discharge.  With the arms raised and the pectoralis muscle contracted, I did not see any skin dimpling or nipple retraction.  The breast tissue is dense across the upper portion of the breast but there were no dominant masses.  Left breast-medium size and symmetrical.  There was no nipple discharge and I did not see any changes to the skin of the breast.  When the arms were maneuvered, there was no skin dimpling or nipple retraction.  She has a similar pattern of density across the upper part of the breast but there were no dominant masses.  Lymphatics-no axillary adenopathy was noted.    Assessment & Plan  At high risk for breast cancer-the patient's physical examination is unremarkable today.  Our plans are to alternate clinical exam with Dr. Esquivel.  We we will begin imaging with MRIs at the age of 28 and then at 33 we will do both mammogram and MRI.  She knows to call me if she palpates anything of concern.    The encounter diagnosis was At high risk for breast cancer.

## 2025-01-05 ENCOUNTER — APPOINTMENT (OUTPATIENT)
Dept: CT IMAGING | Facility: HOSPITAL | Age: 25
End: 2025-01-05

## 2025-01-05 ENCOUNTER — HOSPITAL ENCOUNTER (EMERGENCY)
Facility: HOSPITAL | Age: 25
Discharge: HOME | End: 2025-01-05
Attending: EMERGENCY MEDICINE | Admitting: EMERGENCY MEDICINE

## 2025-01-05 VITALS
RESPIRATION RATE: 16 BRPM | WEIGHT: 142.2 LBS | DIASTOLIC BLOOD PRESSURE: 69 MMHG | SYSTOLIC BLOOD PRESSURE: 111 MMHG | OXYGEN SATURATION: 95 % | HEART RATE: 96 BPM | TEMPERATURE: 98.8 F | HEIGHT: 65 IN | BODY MASS INDEX: 23.69 KG/M2

## 2025-01-05 DIAGNOSIS — N83.202 LEFT OVARIAN CYST: ICD-10-CM

## 2025-01-05 DIAGNOSIS — R10.33 PERIUMBILICAL ABDOMINAL PAIN: Primary | ICD-10-CM

## 2025-01-05 DIAGNOSIS — R11.2 NAUSEA AND VOMITING, UNSPECIFIED VOMITING TYPE: ICD-10-CM

## 2025-01-05 LAB
ALBUMIN SERPL-MCNC: 4.8 G/DL (ref 3.5–5.2)
ALBUMIN/GLOB SERPL: 1.7 G/DL
ALP SERPL-CCNC: 65 U/L (ref 39–117)
ALT SERPL W P-5'-P-CCNC: 16 U/L (ref 1–33)
ANION GAP SERPL CALCULATED.3IONS-SCNC: 13.3 MMOL/L (ref 5–15)
AST SERPL-CCNC: 23 U/L (ref 1–32)
BACTERIA UR QL AUTO: ABNORMAL /HPF
BASOPHILS # BLD AUTO: 0.03 10*3/MM3 (ref 0–0.2)
BASOPHILS NFR BLD AUTO: 0.2 % (ref 0–1.5)
BILIRUB SERPL-MCNC: 1.1 MG/DL (ref 0–1.2)
BILIRUB UR QL STRIP: NEGATIVE
BUN SERPL-MCNC: 11 MG/DL (ref 6–20)
BUN/CREAT SERPL: 14.7 (ref 7–25)
CALCIUM SPEC-SCNC: 9.2 MG/DL (ref 8.6–10.5)
CHLORIDE SERPL-SCNC: 103 MMOL/L (ref 98–107)
CLARITY UR: CLEAR
CO2 SERPL-SCNC: 21.7 MMOL/L (ref 22–29)
COLOR UR: YELLOW
CREAT SERPL-MCNC: 0.75 MG/DL (ref 0.57–1)
DEPRECATED RDW RBC AUTO: 41.2 FL (ref 37–54)
EGFRCR SERPLBLD CKD-EPI 2021: 112.8 ML/MIN/1.73
EOSINOPHIL # BLD AUTO: 0.04 10*3/MM3 (ref 0–0.4)
EOSINOPHIL NFR BLD AUTO: 0.3 % (ref 0.3–6.2)
ERYTHROCYTE [DISTWIDTH] IN BLOOD BY AUTOMATED COUNT: 12.2 % (ref 12.3–15.4)
FLUAV SUBTYP SPEC NAA+PROBE: NOT DETECTED
FLUBV RNA ISLT QL NAA+PROBE: NOT DETECTED
GLOBULIN UR ELPH-MCNC: 2.9 GM/DL
GLUCOSE SERPL-MCNC: 168 MG/DL (ref 65–99)
GLUCOSE UR STRIP-MCNC: NEGATIVE MG/DL
HCG INTACT+B SERPL-ACNC: <0.5 MIU/ML
HCT VFR BLD AUTO: 44.2 % (ref 34–46.6)
HGB BLD-MCNC: 14.6 G/DL (ref 12–15.9)
HGB UR QL STRIP.AUTO: NEGATIVE
HOLD SPECIMEN: NORMAL
HOLD SPECIMEN: NORMAL
HYALINE CASTS UR QL AUTO: ABNORMAL /LPF
IMM GRANULOCYTES # BLD AUTO: 0.07 10*3/MM3 (ref 0–0.05)
IMM GRANULOCYTES NFR BLD AUTO: 0.5 % (ref 0–0.5)
KETONES UR QL STRIP: ABNORMAL
LEUKOCYTE ESTERASE UR QL STRIP.AUTO: ABNORMAL
LIPASE SERPL-CCNC: 21 U/L (ref 13–60)
LYMPHOCYTES # BLD AUTO: 0.34 10*3/MM3 (ref 0.7–3.1)
LYMPHOCYTES NFR BLD AUTO: 2.3 % (ref 19.6–45.3)
MCH RBC QN AUTO: 30.4 PG (ref 26.6–33)
MCHC RBC AUTO-ENTMCNC: 33 G/DL (ref 31.5–35.7)
MCV RBC AUTO: 91.9 FL (ref 79–97)
MONOCYTES # BLD AUTO: 0.33 10*3/MM3 (ref 0.1–0.9)
MONOCYTES NFR BLD AUTO: 2.3 % (ref 5–12)
NEUTROPHILS NFR BLD AUTO: 13.66 10*3/MM3 (ref 1.7–7)
NEUTROPHILS NFR BLD AUTO: 94.4 % (ref 42.7–76)
NITRITE UR QL STRIP: NEGATIVE
NRBC BLD AUTO-RTO: 0 /100 WBC (ref 0–0.2)
PH UR STRIP.AUTO: >=9 [PH] (ref 5–8)
PLATELET # BLD AUTO: 246 10*3/MM3 (ref 140–450)
PMV BLD AUTO: 10.2 FL (ref 6–12)
POTASSIUM SERPL-SCNC: 4.4 MMOL/L (ref 3.5–5.2)
PROT SERPL-MCNC: 7.7 G/DL (ref 6–8.5)
PROT UR QL STRIP: ABNORMAL
RBC # BLD AUTO: 4.81 10*6/MM3 (ref 3.77–5.28)
RBC # UR STRIP: ABNORMAL /HPF
REF LAB TEST METHOD: ABNORMAL
RSV RNA NPH QL NAA+NON-PROBE: NOT DETECTED
SARS-COV-2 RNA RESP QL NAA+PROBE: NOT DETECTED
SODIUM SERPL-SCNC: 138 MMOL/L (ref 136–145)
SP GR UR STRIP: 1.03 (ref 1–1.03)
SQUAMOUS #/AREA URNS HPF: ABNORMAL /HPF
UROBILINOGEN UR QL STRIP: ABNORMAL
WBC # UR STRIP: ABNORMAL /HPF
WBC NRBC COR # BLD AUTO: 14.47 10*3/MM3 (ref 3.4–10.8)
WHOLE BLOOD HOLD COAG: NORMAL
WHOLE BLOOD HOLD SPECIMEN: NORMAL

## 2025-01-05 PROCEDURE — 96376 TX/PRO/DX INJ SAME DRUG ADON: CPT

## 2025-01-05 PROCEDURE — 87637 SARSCOV2&INF A&B&RSV AMP PRB: CPT | Performed by: EMERGENCY MEDICINE

## 2025-01-05 PROCEDURE — 96375 TX/PRO/DX INJ NEW DRUG ADDON: CPT

## 2025-01-05 PROCEDURE — 80053 COMPREHEN METABOLIC PANEL: CPT | Performed by: EMERGENCY MEDICINE

## 2025-01-05 PROCEDURE — 83690 ASSAY OF LIPASE: CPT | Performed by: EMERGENCY MEDICINE

## 2025-01-05 PROCEDURE — 85025 COMPLETE CBC W/AUTO DIFF WBC: CPT | Performed by: EMERGENCY MEDICINE

## 2025-01-05 PROCEDURE — 99285 EMERGENCY DEPT VISIT HI MDM: CPT

## 2025-01-05 PROCEDURE — 36415 COLL VENOUS BLD VENIPUNCTURE: CPT

## 2025-01-05 PROCEDURE — 25810000003 SODIUM CHLORIDE 0.9 % SOLUTION: Performed by: NURSE PRACTITIONER

## 2025-01-05 PROCEDURE — 25010000002 ONDANSETRON PER 1 MG: Performed by: NURSE PRACTITIONER

## 2025-01-05 PROCEDURE — 84702 CHORIONIC GONADOTROPIN TEST: CPT | Performed by: EMERGENCY MEDICINE

## 2025-01-05 PROCEDURE — 25010000002 KETOROLAC TROMETHAMINE PER 15 MG: Performed by: NURSE PRACTITIONER

## 2025-01-05 PROCEDURE — 25010000002 HYDROMORPHONE 1 MG/ML SOLUTION: Performed by: EMERGENCY MEDICINE

## 2025-01-05 PROCEDURE — 25010000002 ONDANSETRON PER 1 MG: Performed by: EMERGENCY MEDICINE

## 2025-01-05 PROCEDURE — 81001 URINALYSIS AUTO W/SCOPE: CPT | Performed by: EMERGENCY MEDICINE

## 2025-01-05 PROCEDURE — 96374 THER/PROPH/DIAG INJ IV PUSH: CPT

## 2025-01-05 PROCEDURE — 25510000001 IOPAMIDOL PER 1 ML: Performed by: EMERGENCY MEDICINE

## 2025-01-05 PROCEDURE — 74177 CT ABD & PELVIS W/CONTRAST: CPT

## 2025-01-05 RX ORDER — KETOROLAC TROMETHAMINE 10 MG/1
10 TABLET, FILM COATED ORAL EVERY 6 HOURS PRN
Qty: 12 TABLET | Refills: 0 | Status: SHIPPED | OUTPATIENT
Start: 2025-01-05

## 2025-01-05 RX ORDER — ACETAMINOPHEN 500 MG
1000 TABLET ORAL ONCE
Status: COMPLETED | OUTPATIENT
Start: 2025-01-05 | End: 2025-01-05

## 2025-01-05 RX ORDER — SODIUM CHLORIDE 0.9 % (FLUSH) 0.9 %
10 SYRINGE (ML) INJECTION AS NEEDED
Status: DISCONTINUED | OUTPATIENT
Start: 2025-01-05 | End: 2025-01-05 | Stop reason: HOSPADM

## 2025-01-05 RX ORDER — KETOROLAC TROMETHAMINE 30 MG/ML
30 INJECTION, SOLUTION INTRAMUSCULAR; INTRAVENOUS ONCE
Status: COMPLETED | OUTPATIENT
Start: 2025-01-05 | End: 2025-01-05

## 2025-01-05 RX ORDER — ONDANSETRON 2 MG/ML
4 INJECTION INTRAMUSCULAR; INTRAVENOUS ONCE
Status: COMPLETED | OUTPATIENT
Start: 2025-01-05 | End: 2025-01-05

## 2025-01-05 RX ORDER — IOPAMIDOL 755 MG/ML
100 INJECTION, SOLUTION INTRAVASCULAR
Status: COMPLETED | OUTPATIENT
Start: 2025-01-05 | End: 2025-01-05

## 2025-01-05 RX ORDER — ONDANSETRON 4 MG/1
4 TABLET, ORALLY DISINTEGRATING ORAL EVERY 8 HOURS PRN
Qty: 10 TABLET | Refills: 0 | Status: SHIPPED | OUTPATIENT
Start: 2025-01-05

## 2025-01-05 RX ORDER — PROMETHAZINE HYDROCHLORIDE 25 MG/1
25 SUPPOSITORY RECTAL EVERY 6 HOURS PRN
Qty: 10 SUPPOSITORY | Refills: 0 | Status: SHIPPED | OUTPATIENT
Start: 2025-01-05

## 2025-01-05 RX ADMIN — IOPAMIDOL 100 ML: 755 INJECTION, SOLUTION INTRAVENOUS at 08:15

## 2025-01-05 RX ADMIN — ONDANSETRON 4 MG: 2 INJECTION INTRAMUSCULAR; INTRAVENOUS at 07:33

## 2025-01-05 RX ADMIN — KETOROLAC TROMETHAMINE 30 MG: 30 INJECTION, SOLUTION INTRAMUSCULAR; INTRAVENOUS at 10:03

## 2025-01-05 RX ADMIN — ACETAMINOPHEN 1000 MG: 500 TABLET ORAL at 08:04

## 2025-01-05 RX ADMIN — HYDROMORPHONE HYDROCHLORIDE 1 MG: 1 INJECTION, SOLUTION INTRAMUSCULAR; INTRAVENOUS; SUBCUTANEOUS at 08:04

## 2025-01-05 RX ADMIN — ONDANSETRON 4 MG: 2 INJECTION INTRAMUSCULAR; INTRAVENOUS at 09:19

## 2025-01-05 RX ADMIN — SODIUM CHLORIDE 1000 ML: 9 INJECTION, SOLUTION INTRAVENOUS at 08:03

## 2025-01-05 NOTE — ED PROVIDER NOTES
Time: 7:37 AM EST  Date of encounter:  1/5/2025  Independent Historian/Clinical History and Information was obtained by:   Patient    History is limited by: N/A    Chief Complaint   Patient presents with    Abdominal Pain    Vomiting    Nausea    Diarrhea     PT ARRIVED POV REPORTING ABDOMINAL PAIN, N/V/D SINCE LAST NIGHT.         History of Present Illness:  Patient is a 24 y.o. year old female who presents to the emergency department for evaluation of abdominal pain that began last night, N/V and unable to keep fluids down, and fever this morning. Pain is across mid abdomen but worse near umbilicus.     Patient Care Team  Primary Care Provider: Ruby Rangel PA-C    Past Medical History:     No Known Allergies  Past Medical History:   Diagnosis Date    Anesthesia     BLOOD PRESSURE DROPPED POST-OP WITH EAR TUBES AGE 4    Anxiety     Deviated septum     History of Clostridium difficile colitis     NUMEROUS ANTIBIOTICS WITH STREP HX    History of strep sore throat     7 TIMES IN 2020    Migraines     Rectal bleeding      Past Surgical History:   Procedure Laterality Date    COLONOSCOPY N/A 09/01/2022    Procedure: COLONOSCOPY TO CECUM AND TERMINAL ILEUM WITH BIOPSIES;  Surgeon: Kaleb Joyner MD;  Location: [facility];  Service: Gastroenterology;  Laterality: N/A;  PRE- BLOOD IN STOOL  POST- HEMORRHOIDS, ANAL FISSURE    EAR TUBES      ENDOSCOPY N/A 09/01/2022    Procedure: ESOPHAGOGASTRODUODENOSCOPY WITH BIOPSIES;  Surgeon: Kaleb Joyner MD;  Location: [facility];  Service: Gastroenterology;  Laterality: N/A;  PRE- ABDOMINAL PAIN  POST- MILD GASTRITIS    SIGMOIDOSCOPY N/A 03/11/2022    Procedure: FLEXIBLE SIGMOIDOSCOPY WITH BIOPSIES;  Surgeon: Baron Ayoub MD;  Location: [facility];  Service: Gastroenterology;  Laterality: N/A;  Pre: rectal bleeding  Post: hemorrhoids    TONSILLECTOMY Bilateral 07/13/2021    Procedure: TONSILLECTOMY;  Surgeon: Duc Alvarez MD;  Location:   [facility];  Service: ENT;  Laterality: Bilateral;    TONSILLECTOMY  07/13/2021    WISDOM TOOTH EXTRACTION       Family History   Problem Relation Age of Onset    Diabetes Mother     Cancer Father         Melanoma    Hyperlipidemia Father     Hypertension Father     Epilepsy Father     Diabetes Maternal Grandmother         CHF & Diabetes    Cancer Paternal Grandfather         Melanoma    Migraines Paternal Aunt     Breast cancer Paternal Aunt     Breast cancer Paternal Aunt     Prostate cancer Paternal Uncle     Leukemia Paternal Uncle     Malig Hyperthermia Neg Hx     Colon cancer Neg Hx     Colon polyps Neg Hx     Crohn's disease Neg Hx     Irritable bowel syndrome Neg Hx     Ulcerative colitis Neg Hx        Home Medications:  Prior to Admission medications    Medication Sig Start Date End Date Taking? Authorizing Provider   acetaminophen (TYLENOL) 500 MG tablet Take 2 tablets by mouth Every 6 (Six) Hours As Needed for Mild Pain.    Provider, MD Arturo   baclofen (LIORESAL) 10 MG tablet Take 1 (one) Tablet by mouth up to three times daily, as needed 3/27/24   Ruby Rangel PA-C   betamethasone, augmented, (DIPROLENE) 0.05 % cream Apply twice a day to arms, legs (not face) as needed for rash, PMLE 6/26/23      busPIRone (BUSPAR) 5 MG tablet Take 1 tablet by mouth 3 (Three) Times a Day.  Patient taking differently: Take 1 tablet by mouth Daily As Needed. 8/4/21   Ruby Rangel PA-C   clindamycin (CLEOCIN T) 1 % lotion Apply  topically to the appropriate area as directed Every Morning. 11/20/23   Ruby Rangel PA-C   cyanocobalamin 1000 MCG/ML injection Inject 1 mL into the appropriate muscle as directed by prescriber Every 14 (Fourteen) Days. 6/10/24   Ruby Rangel PA-C   cyclobenzaprine (FLEXERIL) 5 MG tablet Take 1 tablet by mouth 3 (Three) Times a Day As Needed for Muscle Spasms. 2/13/23   Ruby Rangel PA-C   docusate sodium (COLACE) 100 MG capsule Take 1 capsule by mouth 2 (Two) Times a Day.     "ProviderArturo MD   Erenumab-aooe (Aimovig) 140 MG/ML auto-injector Inject 1 mL under the skin into the appropriate area as directed Every 30 (Thirty) Days. 4/26/24   Ruby Rangel PA-C   Galcanezumab-gnlm 120 MG/ML solution prefilled syringe Inject 1 mL under the skin into the appropriate area as directed Every 30 (Thirty) Days. 7/22/24   Ruby Rangel PA-C   polycarbophil (calcium polycarbophil) 625 MG tablet tablet Take 1 tablet by mouth Daily.    ProviderArturo MD   polyethylene glycol (MIRALAX) 17 GM/SCOOP powder Take 17 g by mouth Daily.    ProviderArturo MD   promethazine (PHENERGAN) 25 MG tablet Take 1 tablet by mouth Every 6 (Six) Hours As Needed for Nausea or Vomiting. 6/27/23   Ruby Rangel PA-C   spironolactone (ALDACTONE) 100 MG tablet Take 1 tablet by mouth Daily. 12/12/23      SUMAtriptan (Imitrex) 50 MG tablet Take one tablet at onset of headache. May repeat dose one time in 2 hours if headache not relieved. 12/7/22   Ruby Rangel PA-C   Syringe/Needle, Disp, 25G X 5/8\" 3 ML misc Use 1 each Every 28 (Twenty-Eight) Days. 9/11/23   Ruby Rangel PA-C   tretinoin (RETIN-A) 0.025 % cream Apply 1 Application topically to the appropriate area as directed Every Night. 12/13/21   Emergency, Nurse Linda, RN        Social History:   Social History     Tobacco Use    Smoking status: Never    Smokeless tobacco: Never    Tobacco comments:     vaping   Vaping Use    Vaping status: Former   Substance Use Topics    Alcohol use: Yes     Comment: Social    Drug use: Never         Review of Systems:  Review of Systems   Constitutional:  Positive for chills and fever.   HENT:  Negative for congestion, ear pain and sore throat.    Eyes:  Negative for pain.   Respiratory:  Negative for cough, chest tightness and shortness of breath.    Cardiovascular:  Negative for chest pain.   Gastrointestinal:  Positive for abdominal pain, nausea and vomiting. Negative for diarrhea.   Genitourinary:  Negative " "for flank pain and hematuria.   Musculoskeletal:  Negative for joint swelling.   Skin:  Negative for pallor.   Neurological:  Negative for seizures and headaches.   All other systems reviewed and are negative.       Physical Exam:  /74 (BP Location: Left arm, Patient Position: Lying)   Pulse 100   Temp 98.6 °F (37 °C) (Oral)   Resp 16   Ht 165.1 cm (65\")   Wt 64.5 kg (142 lb 3.2 oz)   LMP 12/16/2024 (Exact Date)   SpO2 94%   BMI 23.66 kg/m²         Physical Exam  Vitals and nursing note reviewed.   Constitutional:       General: She is in acute distress (restless, appears in pain).      Appearance: Normal appearance. She is not toxic-appearing.   HENT:      Head: Normocephalic and atraumatic.      Mouth/Throat:      Mouth: Mucous membranes are moist.   Eyes:      General: No scleral icterus.  Cardiovascular:      Rate and Rhythm: Normal rate and regular rhythm.      Pulses: Normal pulses.      Heart sounds: Normal heart sounds.   Pulmonary:      Effort: Pulmonary effort is normal. No respiratory distress.      Breath sounds: Normal breath sounds.   Abdominal:      General: Abdomen is flat.      Palpations: Abdomen is soft.      Tenderness: There is abdominal tenderness in the periumbilical area.   Musculoskeletal:         General: Normal range of motion.      Cervical back: Normal range of motion and neck supple.   Skin:     General: Skin is warm and dry.   Neurological:      Mental Status: She is alert and oriented to person, place, and time. Mental status is at baseline.                            Medical Decision Making:      Comorbidities that affect care:    None    External Notes reviewed:    None      The following orders were placed and all results were independently analyzed by me:  Orders Placed This Encounter   Procedures    COVID-19, FLU A/B, RSV PCR 1 HR TAT - Swab, Nasopharynx    CT Abdomen Pelvis With Contrast    [facility] Draw    Comprehensive Metabolic Panel    Lipase    Urinalysis With " Microscopic If Indicated (No Culture) - Urine, Clean Catch    hCG, Quantitative, Pregnancy    CBC Auto Differential    Urinalysis, Microscopic Only - Urine, Clean Catch    NPO Diet NPO Type: Strict NPO    Undress & Gown    PO challenge  Misc Nursing Order (Specify)    Insert Peripheral IV    CBC & Differential    Green Top (Gel)    Lavender Top    Gold Top - SST    Light Blue Top       Medications Given in the Emergency Department:  Medications   sodium chloride 0.9 % flush 10 mL (has no administration in time range)   ondansetron (ZOFRAN) injection 4 mg (4 mg Intravenous Given 1/5/25 0733)   HYDROmorphone (DILAUDID) injection 1 mg (1 mg Intravenous Given 1/5/25 0804)   sodium chloride 0.9 % bolus 1,000 mL (1,000 mL Intravenous New Bag 1/5/25 0803)   acetaminophen (TYLENOL) tablet 1,000 mg (1,000 mg Oral Given 1/5/25 0804)   iopamidol (ISOVUE-370) 76 % injection 100 mL (100 mL Intravenous Given 1/5/25 0815)   ondansetron (ZOFRAN) injection 4 mg (4 mg Intravenous Given 1/5/25 0919)   ketorolac (TORADOL) injection 30 mg (30 mg Intravenous Given 1/5/25 1003)        ED Course:    The patient was initially evaluated in the triage area where orders were placed. The patient was later dispositioned by BETZY Savage.      The patient was advised to stay for completion of workup which includes but is not limited to communication of labs and radiological results, reassessment and plan. The patient was advised that leaving prior to disposition by a provider could result in critical findings that are not communicated to the patient.          Labs:    Lab Results (last 24 hours)       Procedure Component Value Units Date/Time    CBC & Differential [[id number]]  (Abnormal) Collected: 01/05/25 0634    Specimen: Blood Updated: 01/05/25 0640    Narrative:      The following orders were created for panel order CBC & Differential.  Procedure                               Abnormality         Status                     ---------                                -----------         ------                     CBC Auto Differential[[id number]]        Abnormal            Final result                 Please view results for these tests on the individual orders.    Comprehensive Metabolic Panel [[id number]]  (Abnormal) Collected: 01/05/25 0634    Specimen: Blood Updated: 01/05/25 0659     Glucose 168 mg/dL      BUN 11 mg/dL      Creatinine 0.75 mg/dL      Sodium 138 mmol/L      Potassium 4.4 mmol/L      Chloride 103 mmol/L      CO2 21.7 mmol/L      Calcium 9.2 mg/dL      Total Protein 7.7 g/dL      Albumin 4.8 g/dL      ALT (SGPT) 16 U/L      AST (SGOT) 23 U/L      Alkaline Phosphatase 65 U/L      Total Bilirubin 1.1 mg/dL      Globulin 2.9 gm/dL      A/G Ratio 1.7 g/dL      BUN/Creatinine Ratio 14.7     Anion Gap 13.3 mmol/L      eGFR 112.8 mL/min/1.73     Narrative:      GFR Categories in Chronic Kidney Disease (CKD)      GFR Category          GFR (mL/min/1.73)    Interpretation  G1                     90 or greater         Normal or high (1)  G2                      60-89                Mild decrease (1)  G3a                   45-59                Mild to moderate decrease  G3b                   30-44                Moderate to severe decrease  G4                    15-29                Severe decrease  G5                    14 or less           Kidney failure          (1)In the absence of evidence of kidney disease, neither GFR category G1 or G2 fulfill the criteria for CKD.    eGFR calculation 2021 CKD-EPI creatinine equation, which does not include race as a factor    Lipase [[id number]]  (Normal) Collected: 01/05/25 0634    Specimen: Blood Updated: 01/05/25 0659     Lipase 21 U/L     hCG, Quantitative, Pregnancy [[id number]] Collected: 01/05/25 0634    Specimen: Blood Updated: 01/05/25 0657     HCG Quantitative <0.50 mIU/mL     Narrative:      HCG Ranges by Gestational Age    Females - non-pregnant premenopausal   </= 1mIU/mL HCG  Females -  postmenopausal               </= 7mIU/mL HCG    3 Weeks       5.4   -      72 mIU/mL  4 Weeks      10.2   -     708 mIU/mL  5 Weeks       217   -   8,245 mIU/mL  6 Weeks       152   -  32,177 mIU/mL  7 Weeks     4,059   - 153,767 mIU/mL  8 Weeks    31,366   - 149,094 mIU/mL  9 Weeks    59,109   - 135,901 mIU/mL  10 Weeks   44,186   - 170,409 mIU/mL  12 Weeks   27,107   - 201,615 mIU/mL  14 Weeks   24,302   -  93,646 mIU/mL  15 Weeks   12,540   -  69,747 mIU/mL  16 Weeks    8,904   -  55,332 mIU/mL  17 Weeks    8,240   -  51,793 mIU/mL  18 Weeks    9,649   -  55,271 mIU/mL      CBC Auto Differential [[id number]]  (Abnormal) Collected: 01/05/25 0634    Specimen: Blood Updated: 01/05/25 0640     WBC 14.47 10*3/mm3      RBC 4.81 10*6/mm3      Hemoglobin 14.6 g/dL      Hematocrit 44.2 %      MCV 91.9 fL      MCH 30.4 pg      MCHC 33.0 g/dL      RDW 12.2 %      RDW-SD 41.2 fl      MPV 10.2 fL      Platelets 246 10*3/mm3      Neutrophil % 94.4 %      Lymphocyte % 2.3 %      Monocyte % 2.3 %      Eosinophil % 0.3 %      Basophil % 0.2 %      Immature Grans % 0.5 %      Neutrophils, Absolute 13.66 10*3/mm3      Lymphocytes, Absolute 0.34 10*3/mm3      Monocytes, Absolute 0.33 10*3/mm3      Eosinophils, Absolute 0.04 10*3/mm3      Basophils, Absolute 0.03 10*3/mm3      Immature Grans, Absolute 0.07 10*3/mm3      nRBC 0.0 /100 WBC     Urinalysis With Microscopic If Indicated (No Culture) - Urine, Clean Catch [[id number]]  (Abnormal) Collected: 01/05/25 0638    Specimen: Urine, Clean Catch Updated: 01/05/25 0708     Color, UA Yellow     Appearance, UA Clear     pH, UA >=9.0     Specific Gravity, UA 1.027     Glucose, UA Negative     Ketones, UA 40 mg/dL (2+)     Bilirubin, UA Negative     Blood, UA Negative     Protein, UA 30 mg/dL (1+)     Leuk Esterase, UA Small (1+)     Nitrite, UA Negative     Urobilinogen, UA 0.2 E.U./dL    Urinalysis, Microscopic Only - Urine, Clean Catch [[id number]]  (Abnormal) Collected: 01/05/25 0623     Specimen: Urine, Clean Catch Updated: 01/05/25 0708     RBC, UA None Seen /HPF      WBC, UA 0-2 /HPF      Bacteria, UA Trace /HPF      Squamous Epithelial Cells, UA 7-12 /HPF      Hyaline Casts, UA None Seen /LPF      Methodology Manual Light Microscopy    COVID-19, FLU A/B, RSV PCR 1 HR TAT - Swab, Nasopharynx [[id number]]  (Normal) Collected: 01/05/25 0803    Specimen: Swab from Nasopharynx Updated: 01/05/25 0903     COVID19 Not Detected     Influenza A PCR Not Detected     Influenza B PCR Not Detected     RSV, PCR Not Detected    Narrative:      Fact sheet for providers: https://www.fda.gov/media/042672/download    Fact sheet for patients: https://www.fda.gov/media/966540/download    Test performed by PCR.             Imaging:    CT Abdomen Pelvis With Contrast    Result Date: 1/5/2025  CT ABDOMEN PELVIS W CONTRAST Date of Exam: 1/5/2025 8:10 AM EST Indication: Abd pain, N/V, fever abdominal pain. 24-year-old Comparison: CT abdomen pelvis with contrast dated 4/13/2021 Technique: Axial CT images were obtained of the abdomen and pelvis after the uneventful intravenous administration of iodinated contrast. Reconstructed coronal and sagittal images were also obtained. Automated exposure control and iterative construction methods were used. Findings: LOWER THORAX: Lung bases are free of active disease. LIVER: The liver is normal in size. No significant liver lesions.  BILIARY SYSTEM: The gallbladder is There is no biliary dilatation. SPLEEN: Spleen is normal size.  No focal splenic lesions. PANCREAS: No focal masses.  No pancreatic duct dilation.No surrounding fluid or inflammatory changes. ADRENAL GLANDS: Unremarkable. KIDNEYS, URETERS, BLADDER: No hydronephrosis.  No nephroureterolithiasis. No suspicious renal lesions.  Normal appearance of urinary bladder given the amount of distention. REPRODUCTIVE ORGANS: Left adnexal prominence with 3.6 cm cyst likely follicular cyst in the left ovary. A small amount of free  fluid is present in the pelvis which is considered physiologic in female patients of this age. GI/BOWEL: The bowel loops have a normal course and caliber without focal dilation or bowel wall thickening. APPENDIX: The appendix  is normal. PERITONEUM: No free air or free fluid. LYMPH NODES: No pathologically enlarged lymph nodes. ABDOMINAL AORTA: No aneurysmal dilation.  SOFT TISSUES: no significant findings BONES: No acute bony abnormality or  aggressive focal lytic or sclerotic osseous lesions.     Impression: 3.6 cm left ovarian cyst with a small amount of free fluid in the pelvis which is considered physiologic in female patients of this age. Otherwise unremarkable exam. Electronically Signed: Mark Ford DO  1/5/2025 9:02 AM EST  Workstation ID: [id number]       Differential Diagnosis and Discussion:      Abdominal Pain: Based on the patient's signs and symptoms, I considered abdominal aortic aneurysm, small bowel obstruction, pancreatitis, acute cholecystitis, acute appendecitis, peptic ulcer disease, gastritis, colitis, endocrine disorders, irritable bowel syndrome and other differential diagnosis an etiology of the patient's abdominal pain.    PROCEDURES:    Labs were collected in the emergency department and all labs were reviewed and interpreted by me.  CT scan was performed in the emergency department and the CT scan radiology impression was interpreted by me.    No orders to display        Procedures    MDM     Amount and/or Complexity of Data Reviewed  Clinical lab tests: reviewed                     Patient Care Considerations:    SEPSIS was considered but is NOT present in the emergency department as SIRS criteria is not present.      Consultants/Shared Management Plan:    None    Social Determinants of Health:    Patient is independent, reliable, and has access to care.       Disposition and Care Coordination:    Discharged: I considered escalation of care by admitting this patient to the hospital,  however vomiting was controlled with antiemetics, pain controlled, no surgical emergent conditions on CT results    I have explained the patient´s condition, diagnoses and treatment plan based on the information available to me at this time. I have answered questions and addressed any concerns. The patient has a good  understanding of the patient´s diagnosis, condition, and treatment plan as can be expected at this point. The vital signs have been stable. The patient´s condition is stable and appropriate for discharge from the emergency department.      The patient will pursue further outpatient evaluation with the primary care physician or other designated or consulting physician as outlined in the discharge instructions. They are agreeable to this plan of care and follow-up instructions have been explained in detail. The patient has received these instructions in written format and has expressed an understanding of the discharge instructions. The patient is aware that any significant change in condition or worsening of symptoms should prompt an immediate return to this or the closest emergency department or call to 911.    Final diagnoses:   Periumbilical abdominal pain   Nausea and vomiting, unspecified vomiting type   Left ovarian cyst        ED Disposition       ED Disposition   Discharge    Condition   Stable    Comment   --               This medical record created using voice recognition software.             Marques Rueda, APRN  01/05/25 1029

## 2025-02-12 ENCOUNTER — OFFICE VISIT (OUTPATIENT)
Dept: GASTROENTEROLOGY | Facility: CLINIC | Age: 25
End: 2025-02-12
Payer: COMMERCIAL

## 2025-02-12 VITALS
TEMPERATURE: 96.9 F | HEART RATE: 73 BPM | DIASTOLIC BLOOD PRESSURE: 78 MMHG | SYSTOLIC BLOOD PRESSURE: 110 MMHG | OXYGEN SATURATION: 99 % | BODY MASS INDEX: 23.81 KG/M2 | HEIGHT: 65 IN | WEIGHT: 142.9 LBS

## 2025-02-12 DIAGNOSIS — R19.8 ABNORMAL BOWEL MOVEMENT: ICD-10-CM

## 2025-02-12 DIAGNOSIS — R10.84 ABDOMINAL PAIN, GENERALIZED: ICD-10-CM

## 2025-02-12 DIAGNOSIS — D12.6 POLYPOSIS ASSOCIATED WITH HETEROZYGOUS MUTATION IN MUTYH GENE: ICD-10-CM

## 2025-02-12 DIAGNOSIS — L29.0 RECTAL ITCHING: Primary | ICD-10-CM

## 2025-02-12 PROCEDURE — 99214 OFFICE O/P EST MOD 30 MIN: CPT | Performed by: NURSE PRACTITIONER

## 2025-02-12 RX ORDER — HYDROCORTISONE ACETATE PRAMOXINE HCL 2.5; 1 G/100G; G/100G
CREAM TOPICAL
Qty: 30 G | Refills: 1 | Status: SHIPPED | OUTPATIENT
Start: 2025-02-12

## 2025-02-12 NOTE — PROGRESS NOTES
Chief Complaint   Patient presents with    Abdominal Pain             GASTROENTEROLOGY SUMMARY  Initial consult with Dr. Joyner July 11, 2022 for rectal bleeding and abdominal cramping.  Previous patient of Dr. Baron Ayoub.    Has a history of C.difficile in April 2021 following antibiotic treatment for strep 8 times in 2020.    Following C. difficile she was experiencing heartburn, blood in her stool, mucus and abdominal cramping and pain, the symptoms were intermittent.  Dems were ranging from mild to moderate and severe.  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   EGD and colonoscopy September 1, 2022 with Dr. Joyner.  Colonoscopy with random colon biopsies with nonspecific change with increased slightly increased intraepithelial lymphocytes noted, nonspecific, can be seen with diverticulosis, chronic diverticulitis, quiescent inflammatory bowel disease or developing microscopic (lymphocytic colitis).  EGD with normal small bowel biopsies.  Gastric biopsies with minimal chronic inflammation, no H. pylori.  May 2022 celiac serologies blood test was normal.   History of Present Illness  The patient is a 24-year-old female who presents today for follow-up.    Abdominal Pain  She reports persistent abdominal pain, described as sharp and stabbing, typically occurring postprandially. The pain is localized to the mid-abdomen around the umbilical region and radiates in a band-like pattern. She has not identified any specific food triggers. She occasionally experiences bloating after meals, leading her to suspect food sensitivities. The pain is exacerbated by eating and is intermittent in nature.  - Onset: Persistent, typically postprandially.  - Location: Mid-abdomen around the umbilical region.  - Character: Sharp and stabbing.  - Radiation: Radiates in a band-like pattern.  -  Alleviating/Aggravating Factors: Exacerbated by eating.  - Timing: Intermittent.    Bowel Irregularities  Her bowel habits are irregular, alternating between constipation for 2 days and multiple bowel movements per day. She occasionally observes blood in her stool. She has been tested for gluten intolerance, which was negative.  - Onset: Persistent.  - Duration: Alternating between constipation for 2 days and multiple bowel movements per day.  - Character: Irregular bowel habits, occasional blood in stool.    Hemorrhoids  She has been experiencing chronic hemorrhoids for over a year, which sometimes bleed. She avoids straining during defecation and does not prolong her time on the toilet. She occasionally uses stool softeners when constipated. She has previously used MiraLAX for 2 to 3 months, which was beneficial. She discontinued Aimovig due to suspected constipation exacerbation. She has also tried fiber supplements without noticeable improvement. Despite attempts at self-management with creams, the hemorrhoids persist. She has used Cortisone-10 for itching, which provided some relief. She underwent upper and lower endoscopy in 2022.  - Onset: Chronic, over a year.  - Character: Bleeding hemorrhoids.  - Alleviating/Aggravating Factors: Avoids straining, uses stool softeners, MiraLAX was beneficial, discontinued Aimovig, tried fiber supplements, uses creams, Cortisone-10 provided some relief.    Severe Rectal Itching  She recently experienced severe rectal itching, which she attributes to moisture from pad use during menstruation. The itching was so intense that it disrupted her sleep. She also reports that her  has been experiencing similar symptoms, leading her to question if they could have contracted an infection from their shared toilet. She maintains good personal hygiene, including regular showers.  - Onset: Recent.  - Character: Severe rectal itching.  - Alleviating/Aggravating Factors: Attributed  "to moisture from pad use during menstruation.  - Severity: Intense, disrupted sleep.    Supplemental Information  She has a history of ovarian cysts but does not believe these are related to her current symptoms. She had genetic testing in May 2023, which revealed her as a carrier for the MUTYH gene. She has been advised to undergo colonoscopies due to this finding. She takes spironolactone. She takes B12 injections. She takes Imitrex as needed. She takes Retin-A. She takes Phenergan as needed. She takes Zofran as needed. She takes clindamycin lotion. She takes BuSpar as needed. She takes Tylenol as needed. She takes docusate as needed. She takes B12 syringes. She takes Toradol as needed.    SOCIAL HISTORY  She works in [employer] and at the [employer].    FAMILY HISTORY  Her aunts have breast cancer. Her grandfather had appendix cancer and had his appendix and part of his colon removed about a year ago. She is not aware of any family history of colon cancer.    MEDICATIONS  Current: Tylenol, BuSpar, clindamycin lotion, B12 injections, docusate, Zofran, Phenergan, spironolactone, Imitrex, Retin-A  Discontinued: Baclofen, Flexeril, Toradol, MiraLAX, Aimovig           Result Review :       Tissue Pathology Exam (09/01/2022 08:06)  COLONOSCOPY (09/01/2022 07:51)  UPPER GI ENDOSCOPY (09/01/2022 07:52)    Vital Signs:   /78   Pulse 73   Temp 96.9 °F (36.1 °C)   Ht 165.1 cm (65\")   Wt 64.8 kg (142 lb 14.4 oz)   SpO2 99%   BMI 23.78 kg/m²     Body mass index is 23.78 kg/m².     Physical Exam  Vitals reviewed.   Constitutional:       General: She is not in acute distress.     Appearance: Normal appearance. She is not ill-appearing or toxic-appearing.   Eyes:      General: No scleral icterus.  Pulmonary:      Effort: Pulmonary effort is normal. No respiratory distress.   Genitourinary:     Comments: Perianal rectal exam: No yeast, excoriation, anal fissure or drainage noted  Skin:     Coloration: Skin is not " jaundiced.   Neurological:      Mental Status: She is alert and oriented to person, place, and time.   Psychiatric:         Mood and Affect: Mood normal.         Behavior: Behavior normal.         Thought Content: Thought content normal.         Judgment: Judgment normal.         Assessment and Plan        Diagnoses and all orders for this visit:    1. Rectal itching (Primary)  -     Hydrocort-Pramoxine, Perianal, (Analpram HC) 2.5-1 % rectal cream; Apply to rectum three times per day for 10 days  Dispense: 30 g; Refill: 1    2. Abdominal pain, generalized    3. Polyposis associated with heterozygous mutation in MUTYH gene    4. Abnormal bowel movement         Reviewed EGD and colonoscopy September 2022.    Colonoscopy with random colon biopsies with nonspecific change with increased slightly increased intraepithelial lymphocytes noted, nonspecific, can be seen with diverticulosis, chronic diverticulitis, quiescent inflammatory bowel disease or developing microscopic (lymphocytic colitis).  EGD with normal small bowel biopsies.  Gastric biopsies with minimal chronic inflammation, no H. pylori.  May 2022 celiac serologies blood test was normal.   Assessment & Plan  1. Abdominal pain  - Likely due to incomplete bowel emptying or constipation, leading to severe pain and alternating diarrhea  - History of irritable bowel syndrome, worsens with stress and certain foods  - Start MiraLAX daily, beginning with half a capful, and adjust dose as needed  - Track bowel movements on a calendar and bring to next appointment in 1 month    2. Rectal itching  - Not associated with yeast infection or other abnormalities  - Slight redness around the sphincter but no skin breakdown  - Soak affected area in warm water daily  - Apply Analpram to rectum 3 times daily for 10 days  - Inform provider if symptoms do not improve for potential treatment with oral medication for parasitic rectal itching    Follow-up  - Patient to follow up in 1  month    PROCEDURE  Upper and lower endoscopy were performed in 2022.              Patient Instructions   Abdominal pain  - Start MiraLAX daily, beginning with half a capful, and adjust dose as needed  - Track bowel movements on a calendar and bring to next appointment in 1 month    Rectal itching  - Not associated with yeast infection or other abnormalities  - Slight redness around the sphincter but no skin breakdown  - Soak affected area in warm water daily  - Apply Analpram to rectum 3 times daily for 10 days  - Inform provider if symptoms do not improve for potential treatment with oral medication for parasitic rectal itching      Patient or patient representative verbalized consent for the use of Ambient Listening during the visit with  BETZY Duggan for chart documentation. 2/23/2025  11:29 EST    EMR Dragon/Transcription Disclaimer:  This document has been Dictated utilizing Dragon dictation.

## 2025-02-23 NOTE — PATIENT INSTRUCTIONS
Abdominal pain  - Start MiraLAX daily, beginning with half a capful, and adjust dose as needed  - Track bowel movements on a calendar and bring to next appointment in 1 month    Rectal itching  - Not associated with yeast infection or other abnormalities  - Slight redness around the sphincter but no skin breakdown  - Soak affected area in warm water daily  - Apply Analpram to rectum 3 times daily for 10 days  - Inform provider if symptoms do not improve for potential treatment with oral medication for parasitic rectal itching

## 2025-03-13 ENCOUNTER — OFFICE VISIT (OUTPATIENT)
Dept: GASTROENTEROLOGY | Facility: CLINIC | Age: 25
End: 2025-03-13
Payer: COMMERCIAL

## 2025-03-13 VITALS
HEIGHT: 65 IN | TEMPERATURE: 98 F | DIASTOLIC BLOOD PRESSURE: 64 MMHG | WEIGHT: 143.3 LBS | BODY MASS INDEX: 23.88 KG/M2 | SYSTOLIC BLOOD PRESSURE: 98 MMHG | HEART RATE: 85 BPM | OXYGEN SATURATION: 99 %

## 2025-03-13 DIAGNOSIS — K59.04 CHRONIC IDIOPATHIC CONSTIPATION: Primary | ICD-10-CM

## 2025-03-13 DIAGNOSIS — R10.30 LOWER ABDOMINAL PAIN: ICD-10-CM

## 2025-03-13 DIAGNOSIS — L29.0 RECTAL ITCHING: ICD-10-CM

## 2025-03-13 PROCEDURE — 99213 OFFICE O/P EST LOW 20 MIN: CPT | Performed by: NURSE PRACTITIONER

## 2025-03-13 NOTE — PROGRESS NOTES
Chief Complaint   Patient presents with    Follow-up     1 month follow-up             GASTROENTEROLOGY SUMMARY  Initial consult with Dr. Joyner July 11, 2022 for rectal bleeding and abdominal cramping.  Last visit February 12, 2025 for abdominal pain, irregular bowel movements, rectal bleeding and hemorrhoids.  Previous patient of Dr. Baron Ayoub.    She has a personal history of C.difficile in April 2021 following antibiotic treatment for strep 8 times in 2020.    Following C. difficile she was experiencing heartburn, blood in her stool, mucus and abdominal cramping and pain, the symptoms were intermittent.  Symptoms were ranging from mild to moderate and severe.  EGD and colonoscopy September 1, 2022 with Dr. Joyner.  Colonoscopy with random colon biopsies with nonspecific change with increased slightly increased intraepithelial lymphocytes noted, nonspecific, can be seen with diverticulosis, chronic diverticulitis, quiescent inflammatory bowel disease or developing microscopic (lymphocytic colitis).  EGD with normal small bowel biopsies.  Gastric biopsies with minimal chronic inflammation, no H. pylori.  May 2022 celiac serologies blood test was normal.     Given significant family history of breast cancer she underwent genetic testing.  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       History of Present Illness  The patient is a 24-year-old female who presents today for a 1-month follow-up.    Constipation  She has been experiencing constipation, with several instances of forgetting to take her MiraLAX, particularly on weekends.  She has been documenting her bowel movements on a calendar, noting days without bowel movements in purple.  She recalls a period of approximately 3 days without a bowel movement, which coincided with a missed dose of MiraLAX the previous weekend.  She typically has 2  bowel movements per day, but the consistency varies. For instance, she had a soft bowel movement in the morning yesterday, followed by a hard and slightly painful one in the afternoon.  She is uncertain whether the pain is due to rectal irritation or the hardness of the stool.  She has not been using the stool softener or suppositories, focusing solely on MiraLAX.  She has not required antiemetic medication.  She is currently taking a capful of MiraLAX, but often feels incomplete evacuation after bowel movements.  - Onset: Period of approximately 3 days without a bowel movement coinciding with a missed dose of MiraLAX the previous weekend.  - Duration: 1 month follow-up period.  - Character: Varies from soft to hard and slightly painful bowel movements.  - Alleviating/Aggravating Factors: Forgetting to take MiraLAX, particularly on weekends.  - Timing: Typically has 2 bowel movements per day.  - Severity: Feels incomplete evacuation after bowel movements.    Abdominal Pain and Bloating  She reports frequent bloating, which she attributes to her menstrual cycle. She also experiences abdominal pain around her umbilical region, occurring 2 to 3 times, including an episode that disrupted her sleep.  The pain appears to be associated with periods of constipation and has occurred during meals.  - Onset: Associated with periods of constipation and during meals.  - Location: Around her umbilical region.  - Duration: Occurred 2 to 3 times.  - Character: Abdominal pain and bloating.  - Timing: Including an episode that disrupted her sleep.    Pruritus Ani  She continues to experience pruritus ani, which has escalated to irritation despite refraining from scratching.  The itching is intermittent, with some days being more tolerable than others. She describes a sensation of rawness and has noticed a burning sensation post-shower.  She has been applying a cream, which initially provided relief, but the symptoms have since  "returned.  She has found some relief with Aquaphor.  - Onset: Intermittent itching.  - Character: Sensation of rawness and burning post-shower.  - Alleviating/Aggravating Factors: Applying a cream and Aquaphor.  - Severity: Symptoms have returned despite initial relief from the cream.    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      Vital Signs:   BP 98/64   Pulse 85   Temp 98 °F (36.7 °C)   Ht 165.1 cm (65\")   Wt 65 kg (143 lb 4.8 oz)   SpO2 99%   BMI 23.85 kg/m²     Body mass index is 23.85 kg/m².     Physical Exam  Vitals reviewed.   Constitutional:       General: She is not in acute distress.     Appearance: Normal appearance. She is not ill-appearing or toxic-appearing.   Eyes:      General: No scleral icterus.  Pulmonary:      Effort: Pulmonary effort is normal. No respiratory distress.   Genitourinary:     Comments: Perianal excoriation in a [place] extending approximately 1 inch from sphincter, bilateral buttocks, no drainage, swelling or induration noted  Skin:     Coloration: Skin is not jaundiced.   Neurological:      Mental Status: She is alert and oriented to person, place, and time.   Psychiatric:         Mood and Affect: Mood normal.         Behavior: Behavior normal.         Thought Content: Thought content normal.         Judgment: Judgment normal.         Assessment and Plan        Diagnoses and all orders for this visit:    1. Chronic idiopathic constipation (Primary)    2. Rectal itching    3. Lower abdominal pain       Assessment & Plan  1. Constipation and abdominal pain  - Condition has shown some improvement, with reduction in postprandial pain episodes from daily to 3 or 4 times, improvement in frequency and severity  - Abdominal discomfort likely due to irregular bowel movements and stool accumulation  - Increase MiraLAX dosage to 1.5 capfuls up to 2 capfuls per day, keep track of bowel movements and response and adjust MiraLAX as needed  - If " necessary, increase dosage to 2 capfuls  - Maintain a record of abdominal pain and missed doses  - Discontinue Colace    2. Pruritus ani and perianal excoriation    - Still experiences discomfort during bowel movements  - Apply itch medication exclusively on the sphincter and use Aquaphor around it  - Apply a barrier cream and avoid Brazilian wax treatments until next appointment  - Keep the area clean and dry, avoid using baby wipes, and apply Aquaphor only when symptomatic  - Inform if there is a need to add an antifungal  - If condition does not improve, consider adding an over-the-counter topical antifungal such as Lotrimin    Follow-up  - Patient scheduled for a follow-up visit in 4 weeks             Patient Instructions   Irregular bowel movements  - Abdominal discomfort likely due to irregular bowel movements and stool accumulation  - Increase MiraLAX dosage to 1.5 capfuls up to 2 capfuls per day, keep track of bowel movements, dose of MiraLAX and response and adjust MiraLAX as needed  - If necessary, increase dosage to 2 capfuls  - Maintain a record of abdominal pain and missed doses of MiraLAX, recommend daily use as discussed  - Discontinue Colace    - Still experiences discomfort during bowel movements  -Okay to use Analpram itch medication exclusively on the sphincter and use Aquaphor on skin tissue around rectal sphincter for irritation   - Apply a barrier cream and avoid Brazilian wax treatments until next appointment  - Keep the area clean and dry, avoid using baby wipes, and apply Aquaphor only when symptomatic  -No evidence of fungal infection or ulceration during today's visit, continue barrier cream  - If condition does not improve, consider adding an over-the-counter topical antifungal such as clotrimazole 1% cream or prescription compounded medication including antifungal, mild hydrocortisone and barrier cream also to be considered.      Patient or patient representative verbalized consent for  the use of Ambient Listening during the visit with  BETZY Duggan for chart documentation. 3/17/2025  08:37 EDT    EMR Dragon/Transcription Disclaimer:  This document has been Dictated utilizing Dragon dictation.

## 2025-03-17 NOTE — PATIENT INSTRUCTIONS
Irregular bowel movements  - Abdominal discomfort likely due to irregular bowel movements and stool accumulation  - Increase MiraLAX dosage to 1.5 capfuls up to 2 capfuls per day, keep track of bowel movements, dose of MiraLAX and response and adjust MiraLAX as needed  - If necessary, increase dosage to 2 capfuls  - Maintain a record of abdominal pain and missed doses of MiraLAX, recommend daily use as discussed  - Discontinue Colace    - Still experiences discomfort during bowel movements  -Okay to use Analpram itch medication exclusively on the sphincter and use Aquaphor on skin tissue around rectal sphincter for irritation   - Apply a barrier cream and avoid Brazilian wax treatments until next appointment  - Keep the area clean and dry, avoid using baby wipes, and apply Aquaphor only when symptomatic  -No evidence of fungal infection or ulceration during today's visit, continue barrier cream  - If condition does not improve, consider adding an over-the-counter topical antifungal such as clotrimazole 1% cream or prescription compounded medication including antifungal, mild hydrocortisone and barrier cream also to be considered.

## 2025-04-16 ENCOUNTER — OFFICE VISIT (OUTPATIENT)
Dept: GASTROENTEROLOGY | Facility: CLINIC | Age: 25
End: 2025-04-16
Payer: COMMERCIAL

## 2025-04-16 VITALS
SYSTOLIC BLOOD PRESSURE: 98 MMHG | TEMPERATURE: 96.9 F | HEIGHT: 65 IN | BODY MASS INDEX: 23.72 KG/M2 | DIASTOLIC BLOOD PRESSURE: 62 MMHG | HEART RATE: 98 BPM | OXYGEN SATURATION: 98 % | WEIGHT: 142.4 LBS

## 2025-04-16 DIAGNOSIS — R19.8 IRREGULAR BOWEL HABITS: Primary | ICD-10-CM

## 2025-04-16 DIAGNOSIS — R14.0 ABDOMINAL BLOATING: ICD-10-CM

## 2025-04-16 PROCEDURE — 99213 OFFICE O/P EST LOW 20 MIN: CPT | Performed by: NURSE PRACTITIONER

## 2025-04-16 NOTE — PROGRESS NOTES
Chief Complaint   Patient presents with    Follow-up     Constipation             GASTROENTEROLOGY SUMMARY  Initial consult with Dr. Joyner July 11, 2022 for rectal bleeding and abdominal cramping.  Last visit March 13, 2025 February 2025 visit for abdominal pain, irregular bowel movements, rectal bleeding and hemorrhoids.  Previous patient of Dr. Baron Ayoub.    She has a personal history of C.difficile in April 2021 following antibiotic treatment for strep 8 times in 2020.    Following C. difficile she was experiencing heartburn, blood in her stool, mucus and abdominal cramping and pain, the symptoms were intermittent.  Symptoms were ranging from mild to moderate and severe.  EGD and colonoscopy September 1, 2022 with Dr. Joyner.  Colonoscopy with random colon biopsies with nonspecific change with increased slightly increased intraepithelial lymphocytes noted, nonspecific, can be seen with diverticulosis, chronic diverticulitis, quiescent inflammatory bowel disease or developing microscopic (lymphocytic colitis).  EGD with normal small bowel biopsies.  Gastric biopsies with minimal chronic inflammation, no H. pylori.  May 2022 celiac serologies blood test was normal.      Given significant family history of breast cancer she underwent genetic testing.  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     History of Present Illness  The patient is a 24-year-old female who presents today for a 1-month follow-up.    Pruritus Ani- Resolved   No rectal itching is reported, and the previous symptoms are attributed to a waxing procedure.  She also discontinued baby wipes at the same time although not using daily, unsure exact cause for improvement.     Bowel Movements  Bowel movements have been regular, with an instance of 4 bowel movements in a single day two Saturdays ago, likely due to coffee  "consumption.   Typically, she has 1 to 2 bowel movements per day, which are usually complete.   No constipation has occurred since the last visit, and no additional medication for bowel movements has been required.   Abdominal pain or sleep disturbances are not reported.  - Onset: Regular bowel movements, with an instance of 4 bowel movements in a single day two Saturdays ago.  - Duration: Typically 1 to 2 bowel movements per day.  - Character: Regular bowel movements, usually complete.  - Alleviating/Aggravating Factors: Coffee consumption likely caused the instance of 4 bowel movements in a single day.  - Severity: No constipation since the last visit; no additional medication required.    Bloating  Bloating is experienced after certain meals, particularly in the evening. Bloating has been noted this week, coinciding with the start of her menstrual cycle this morning.   Menstrual cycles are regular.  - Onset: Bloating noted this week, coinciding with the start of her menstrual cycle.  - Location: Abdominal bloating.  - Timing: Particularly in the evening after certain meals.  - Severity: No abdominal pain or sleep disturbances reported.    Diet and Medication  A light diet is maintained, typically consuming only lunch due to her work schedule. MiraLAX is taken, nearly 2 capfuls daily. Coffee is consumed 3 to 4 times a week, which is believed to stimulate bowel movements. Previously, stool softeners were used during periods of constipation, and IBgard was taken, although not recently.    SOCIAL HISTORY  Marital Status:   Diet: Eats lunch regularly, sometimes skips dinner due to late work shifts. Eats breakfast in the morning.  Coffee/Tea/Caffeine-containing Drinks: Drinks energy drinks and coffee, alternates between them, consumes coffee 3-4 times a week.     Vital Signs:   BP 98/62   Pulse 98   Temp 96.9 °F (36.1 °C)   Ht 165.1 cm (65\")   Wt 64.6 kg (142 lb 6.4 oz)   SpO2 98%   BMI 23.70 kg/m²   "   Body mass index is 23.7 kg/m².     Physical Exam  Constitutional:       General: She is not in acute distress.     Appearance: Normal appearance. She is well-developed. She is not ill-appearing.   Eyes:      General: No scleral icterus.  Pulmonary:      Effort: No respiratory distress.   Skin:     Coloration: Skin is not jaundiced or pale.   Neurological:      Mental Status: She is alert and oriented to person, place, and time.   Psychiatric:         Mood and Affect: Mood normal.         Behavior: Behavior normal.         Thought Content: Thought content normal.         Judgment: Judgment normal.       Assessment and Plan        Diagnoses and all orders for this visit:    1. Irregular bowel habits (Primary)    2. Abdominal bloating       Assessment & Plan  1. Bowel movement irregularity with Bloating:  Improved but would still like to work on more frequent complete evacuation  - Continue MiraLAX, 2 capfuls daily.  - Start Senokot 8.6 mg every other day.  - Monitor response to Senokot; if bloating persists, increase Senokot to daily use with MiraLAX.  - Maintain a record of symptoms, particularly in relation to the menstrual cycle, over the next 2 months.  - Consider increasing Senokot dosage if severe bloating occurs prior to the menstrual cycle.    Follow-up:  - Scheduled for 06/26/2025 at 8:00 AM.             Patient Instructions   - Continue MiraLAX, 2 capfuls daily.  - Start Senokot 8.6 mg every other day.  - Monitor response to Senokot; if bloating persists, increase Senokot to daily use with MiraLAX.  - Maintain a record of symptoms, particularly in relation to the menstrual cycle, over the next 2 months.  - Consider increasing Senokot dosage if severe bloating occurs prior to the menstrual cycle.    Follow-up:  - Scheduled for 06/26/2025 at 8:00 AM.      Patient or patient representative verbalized consent for the use of Ambient Listening during the visit with  BETZY Duggan for chart  documentation. 4/16/2025  10:26 EDT    EMR Dragon/Transcription Disclaimer:  This document has been Dictated utilizing Dragon dictation.

## 2025-04-16 NOTE — PATIENT INSTRUCTIONS
- Continue MiraLAX, 2 capfuls daily.  - Start Senokot 8.6 mg every other day.  - Monitor response to Senokot; if bloating persists, increase Senokot to daily use with MiraLAX.  - Maintain a record of symptoms, particularly in relation to the menstrual cycle, over the next 2 months.  - Consider increasing Senokot dosage if severe bloating occurs prior to the menstrual cycle.    Follow-up:  - Scheduled for 06/26/2025 at 8:00 AM.

## 2025-08-22 ENCOUNTER — OFFICE VISIT (OUTPATIENT)
Dept: INTERNAL MEDICINE | Facility: CLINIC | Age: 25
End: 2025-08-22
Payer: COMMERCIAL

## 2025-08-22 ENCOUNTER — LAB (OUTPATIENT)
Facility: HOSPITAL | Age: 25
End: 2025-08-22
Payer: COMMERCIAL

## 2025-08-22 VITALS — TEMPERATURE: 98.4 F | WEIGHT: 143 LBS | BODY MASS INDEX: 23.82 KG/M2 | HEIGHT: 65 IN
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
## 2025-08-22 DIAGNOSIS — E53.8 B12 DEFICIENCY: ICD-10-CM

## 2025-08-22 LAB
BASOPHILS # BLD AUTO: 0.03 10*3/MM3 (ref 0–0.2)
BASOPHILS NFR BLD AUTO: 0.6 % (ref 0–1.5)
DEPRECATED RDW RBC AUTO: 40.5 FL (ref 37–54)
EOSINOPHIL # BLD AUTO: 0.1 10*3/MM3 (ref 0–0.4)
EOSINOPHIL NFR BLD AUTO: 1.9 % (ref 0.3–6.2)
ERYTHROCYTE [DISTWIDTH] IN BLOOD BY AUTOMATED COUNT: 12 % (ref 12.3–15.4)
HCT VFR BLD AUTO: 41.7 % (ref 34–46.6)
HGB BLD-MCNC: 13.8 G/DL (ref 12–15.9)
IMM GRANULOCYTES # BLD AUTO: 0.01 10*3/MM3 (ref 0–0.05)
IMM GRANULOCYTES NFR BLD AUTO: 0.2 % (ref 0–0.5)
LYMPHOCYTES # BLD AUTO: 1.24 10*3/MM3 (ref 0.7–3.1)
LYMPHOCYTES NFR BLD AUTO: 23.6 % (ref 19.6–45.3)
MCH RBC QN AUTO: 30.6 PG (ref 26.6–33)
MCHC RBC AUTO-ENTMCNC: 33.1 G/DL (ref 31.5–35.7)
MCV RBC AUTO: 92.5 FL (ref 79–97)
MONOCYTES # BLD AUTO: 0.4 10*3/MM3 (ref 0.1–0.9)
MONOCYTES NFR BLD AUTO: 7.6 % (ref 5–12)
NEUTROPHILS NFR BLD AUTO: 3.48 10*3/MM3 (ref 1.7–7)
NEUTROPHILS NFR BLD AUTO: 66.1 % (ref 42.7–76)
NRBC BLD AUTO-RTO: 0 /100 WBC (ref 0–0.2)
PLATELET # BLD AUTO: 236 10*3/MM3 (ref 140–450)
PMV BLD AUTO: 10.3 FL (ref 6–12)
RBC # BLD AUTO: 4.51 10*6/MM3 (ref 3.77–5.28)
VIT B12 BLD-MCNC: 524 PG/ML (ref 211–946)
WBC NRBC COR # BLD AUTO: 5.26 10*3/MM3 (ref 3.4–10.8)

## 2025-08-22 PROCEDURE — 36415 COLL VENOUS BLD VENIPUNCTURE: CPT | Performed by: PHYSICIAN ASSISTANT

## 2025-08-25 LAB — B BURGDOR DNA SPEC QL NAA+PROBE: NEGATIVE

## 2025-08-26 LAB
A PHAGOCYTOPH IGG TITR SER IF: NEGATIVE {TITER}
A PHAGOCYTOPH IGM TITR SER IF: NEGATIVE {TITER}
E CHAFFEENSIS IGG TITR SER IF: NEGATIVE {TITER}
E CHAFFEENSIS IGM TITR SER IF: NEGATIVE {TITER}
RESULT COMMENT:: NORMAL

## 2025-08-29 LAB — RICKETTSIA RICKETTSII DNA, RT: NOT DETECTED

## (undated) DEVICE — ELECTRD NDL EZ CLN MOD 4IN

## (undated) DEVICE — MSK PROC CURAPLEX O2 2/ADAPT 7FT

## (undated) DEVICE — ADAPT CLN BIOGUARD AIR/H2O DISP

## (undated) DEVICE — BITEBLOCK OMNI BLOC

## (undated) DEVICE — LN SMPL CO2 SHTRM SD STREAM W/M LUER

## (undated) DEVICE — SUT MNCRYL 4/0 RB1 27IN Y214H

## (undated) DEVICE — SINGLE-USE BIOPSY FORCEPS: Brand: RADIAL JAW 4

## (undated) DEVICE — GLV SURG PREMIERPRO ORTHO LTX PF SZ7.5 BRN

## (undated) DEVICE — CANN O2 ETCO2 FITS ALL CONN CO2 SMPL A/ 7IN DISP LF

## (undated) DEVICE — KT ORCA ORCAPOD DISP STRL

## (undated) DEVICE — TUBING, SUCTION, 1/4" X 20', STRAIGHT: Brand: MEDLINE INDUSTRIES, INC.

## (undated) DEVICE — TUBING, SUCTION, 1/4" X 10', STRAIGHT: Brand: MEDLINE

## (undated) DEVICE — KT ANTI FOG W/FLD AND SPNG

## (undated) DEVICE — SENSR O2 OXIMAX FNGR A/ 18IN NONSTR

## (undated) DEVICE — PENCL E/S ULTRAVAC TELESCP NOSE HOLSTR 10FT

## (undated) DEVICE — PK ENT MINOR 40

## (undated) DEVICE — ELECTRD NDL EZ CLN MOD 2.75IN

## (undated) DEVICE — COAGULATOR SXN MEGADYNE FT/CONTRL 10F 6IN

## (undated) DEVICE — DRAPE,REIN 53X77,STERILE: Brand: MEDLINE

## (undated) DEVICE — TRAP FLD MINIVAC MEGADYNE 100ML

## (undated) DEVICE — SYRINGE,EAR/ULCER, RED, 2 OZ, STERILE: Brand: MEDLINE

## (undated) DEVICE — SPNG DISSCT SECTO TONSL MD PK/5